# Patient Record
Sex: MALE | Race: OTHER | HISPANIC OR LATINO
[De-identification: names, ages, dates, MRNs, and addresses within clinical notes are randomized per-mention and may not be internally consistent; named-entity substitution may affect disease eponyms.]

---

## 2017-01-09 ENCOUNTER — APPOINTMENT (OUTPATIENT)
Dept: ORTHOPEDIC SURGERY | Facility: CLINIC | Age: 67
End: 2017-01-09

## 2017-01-12 ENCOUNTER — APPOINTMENT (OUTPATIENT)
Dept: ORTHOPEDIC SURGERY | Facility: CLINIC | Age: 67
End: 2017-01-12

## 2017-01-16 ENCOUNTER — APPOINTMENT (OUTPATIENT)
Dept: ORTHOPEDIC SURGERY | Facility: CLINIC | Age: 67
End: 2017-01-16

## 2017-02-02 ENCOUNTER — APPOINTMENT (OUTPATIENT)
Dept: ORTHOPEDIC SURGERY | Facility: CLINIC | Age: 67
End: 2017-02-02

## 2017-02-02 RX ORDER — CEPHALEXIN 500 MG/1
500 CAPSULE ORAL 4 TIMES DAILY
Qty: 20 | Refills: 0 | Status: COMPLETED | COMMUNITY
Start: 2017-01-09 | End: 2017-02-02

## 2017-02-13 ENCOUNTER — APPOINTMENT (OUTPATIENT)
Dept: ORTHOPEDIC SURGERY | Facility: CLINIC | Age: 67
End: 2017-02-13

## 2017-03-10 ENCOUNTER — APPOINTMENT (OUTPATIENT)
Dept: ORTHOPEDIC SURGERY | Facility: CLINIC | Age: 67
End: 2017-03-10

## 2017-06-22 ENCOUNTER — APPOINTMENT (OUTPATIENT)
Dept: ORTHOPEDIC SURGERY | Facility: CLINIC | Age: 67
End: 2017-06-22

## 2017-12-21 ENCOUNTER — APPOINTMENT (OUTPATIENT)
Dept: ORTHOPEDIC SURGERY | Facility: CLINIC | Age: 67
End: 2017-12-21
Payer: COMMERCIAL

## 2017-12-21 DIAGNOSIS — Z47.1 AFTERCARE FOLLOWING JOINT REPLACEMENT SURGERY: ICD-10-CM

## 2017-12-21 DIAGNOSIS — M25.562 PAIN IN RIGHT HIP: ICD-10-CM

## 2017-12-21 DIAGNOSIS — M25.561 PAIN IN RIGHT HIP: ICD-10-CM

## 2017-12-21 DIAGNOSIS — G89.29 PAIN IN RIGHT HIP: ICD-10-CM

## 2017-12-21 DIAGNOSIS — M25.552 PAIN IN RIGHT HIP: ICD-10-CM

## 2017-12-21 DIAGNOSIS — Z96.652 AFTERCARE FOLLOWING JOINT REPLACEMENT SURGERY: ICD-10-CM

## 2017-12-21 DIAGNOSIS — M25.551 PAIN IN RIGHT HIP: ICD-10-CM

## 2017-12-21 PROCEDURE — 99213 OFFICE O/P EST LOW 20 MIN: CPT

## 2022-11-24 ENCOUNTER — EMERGENCY (EMERGENCY)
Facility: HOSPITAL | Age: 72
LOS: 1 days | Discharge: HOME | End: 2022-11-24
Attending: STUDENT IN AN ORGANIZED HEALTH CARE EDUCATION/TRAINING PROGRAM
Payer: MEDICARE

## 2022-11-24 VITALS
RESPIRATION RATE: 16 BRPM | DIASTOLIC BLOOD PRESSURE: 82 MMHG | TEMPERATURE: 98 F | WEIGHT: 154.32 LBS | HEIGHT: 66 IN | SYSTOLIC BLOOD PRESSURE: 135 MMHG | OXYGEN SATURATION: 98 % | HEART RATE: 87 BPM

## 2022-11-24 DIAGNOSIS — Z98.890 OTHER SPECIFIED POSTPROCEDURAL STATES: Chronic | ICD-10-CM

## 2022-11-24 PROCEDURE — 70450 CT HEAD/BRAIN W/O DYE: CPT | Mod: MA

## 2022-11-24 PROCEDURE — 70450 CT HEAD/BRAIN W/O DYE: CPT | Mod: 26,MA

## 2022-11-24 PROCEDURE — 99284 EMERGENCY DEPT VISIT MOD MDM: CPT

## 2022-11-24 PROCEDURE — 99284 EMERGENCY DEPT VISIT MOD MDM: CPT | Mod: 25

## 2022-11-24 PROCEDURE — 73502 X-RAY EXAM HIP UNI 2-3 VIEWS: CPT

## 2022-11-24 PROCEDURE — 73502 X-RAY EXAM HIP UNI 2-3 VIEWS: CPT | Mod: 26,RT

## 2022-11-24 RX ORDER — ACETAMINOPHEN 500 MG
650 TABLET ORAL ONCE
Refills: 0 | Status: COMPLETED | OUTPATIENT
Start: 2022-11-24 | End: 2022-11-24

## 2022-11-24 RX ADMIN — Medication 650 MILLIGRAM(S): at 15:26

## 2022-11-24 RX ADMIN — Medication 650 MILLIGRAM(S): at 15:38
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
## 2022-11-24 NOTE — ED PROVIDER NOTE - NSFOLLOWUPINSTRUCTIONS_ED_ALL_ED_FT
Head Injury, Adult       There are many types of head injuries. Head injuries can be as minor as a small bump, or they can be a serious medical issue. More severe head injuries include:  •A jarring injury to the brain (concussion).      •A bruise (contusion) of the brain. This means there is bleeding in the brain that can cause swelling.      •A cracked skull (skull fracture).      •Bleeding in the brain that collects, clots, and forms a bump (hematoma).      After a head injury, most problems occur within the first 24 hours, but side effects may occur up to 7–10 days after the injury. It is important to watch your condition for any changes. You may need to be observed in the emergency department or urgent care, or you may be admitted to the hospital.      What are the causes?    There are many possible causes of a head injury. Serious head injuries may be caused by car accidents, bicycle or motorcycle accidents, sports injuries, falls, or being struck by an object.      What are the symptoms?    Symptoms of a head injury include a contusion, bump, or bleeding at the site of the injury. Other physical symptoms may include:  •Headache.      •Nausea or vomiting.      •Dizziness.      •Blurred or double vision.      •Being uncomfortable around bright lights or loud noises.      •Seizures.      •Feeling tired.      •Trouble being awakened.      •Loss of consciousness.      Mental or emotional symptoms may include:  •Irritability.      •Confusion and memory problems.      •Poor attention and concentration.      •Changes in eating or sleeping habits.      •Anxiety or depression.        How is this diagnosed?    This condition can usually be diagnosed based on your symptoms, a description of the injury, and a physical exam. You may also have imaging tests done, such as a CT scan or an MRI.      How is this treated?    Treatment for this condition depends on the severity and type of injury you have. The main goal of treatment is to prevent complications and allow the brain time to heal.    Mild head injury     If you have a mild head injury, you may be sent home, and treatment may include:  •Observation. A responsible adult should stay with you for 24 hours after your injury and check on you often.      •Physical rest.      •Brain rest.      •Pain medicines.      Severe head injury    If you have a severe head injury, treatment may include:•Close observation. This includes hospitalization with the following care:  •Frequent physical exams.      •Frequent checks of how your brain and nervous system are working (neurological status).      •Checking your blood pressure and oxygen levels.        •Medicines to relieve pain, prevent seizures, and decrease brain swelling.      •Airway protection and breathing support. This may include using a ventilator.      •Treatments that monitor and manage swelling inside the brain.    •Brain surgery. This may be needed to:  •Remove a collection of blood or blood clots.      •Stop the bleeding.      •Remove a part of the skull to allow room for the brain to swell.          Follow these instructions at home:    Activity     •Rest and avoid activities that are physically hard or tiring.      •Make sure you get enough sleep.    •Let your brain rest by limiting activities that require a lot of thought or attention, such as:  •Watching TV.      •Playing memory games and puzzles.      •Job-related work or homework.      •Working on the computer, using social media, and texting.        •Avoid activities that could cause another head injury, such as playing sports, until your health care provider approves. Having another head injury, especially before the first one has healed, can be dangerous.      •Ask your health care provider when it is safe for you to return to your regular activities, including work or school. Ask your health care provider for a step-by-step plan for gradually returning to activities.      •Ask your health care provider when you can drive, ride a bicycle, or use heavy machinery. Your ability to react may be slower after a brain injury. Do not do these activities if you are dizzy.        Lifestyle      • Do not drink alcohol until your health care provider approves. Do not use drugs. Alcohol and certain drugs may slow your recovery and can put you at risk of further injury.      •If it is harder than usual to remember things, write them down.      •If you are easily distracted, try to do one thing at a time.      •Talk with family members or close friends when making important decisions.      •Tell your friends, family, a trusted colleague, and  about your injury, symptoms, and restrictions. Have them watch for any new or worsening problems.      General instructions     •Take over-the-counter and prescription medicines only as told by your health care provider.      •Have someone stay with you for 24 hours after your head injury. This person should watch you for any changes in your symptoms and be ready to seek medical help.      •Keep all follow-up visits as told by your health care provider. This is important.        How is this prevented?    •Work on improving your balance and strength to avoid falls.      •Wear a seat belt when you are in a moving vehicle.      •Wear a helmet when riding a bicycle, skiing, or doing any other sport or activity that has a risk of injury.    •If you drink alcohol:•Limit how much you use to:  •0–1 drink a day for nonpregnant women.      •0–2 drinks a day for men.        •Be aware of how much alcohol is in your drink. In the [place] one drink equals one 12 oz bottle of beer (355 mL), one 5 oz glass of wine (148 mL), or one 1½ oz glass of hard liquor (44 mL).      •Take safety measures in your home, such as:  •Removing clutter and tripping hazards from floors and stairways.      •Using grab bars in bathrooms and handrails by stairs.      •Placing non-slip mats on floors and in bathtubs.      •Improving lighting in dim areas.          Where to find more information    •Centers for Disease Control and Prevention: www.cdc.gov        Get help right away if:  •You have:  •A severe headache that is not helped by medicine.      •Trouble walking or weakness in your arms and legs.      •Clear or bloody fluid coming from your nose or ears.      •Changes in your vision.      •A seizure.      •Increased confusion or irritability.        •Your symptoms get worse.      •You are sleepier than normal and have trouble staying awake.      •You lose your balance.      •Your pupils change size.      •Your speech is slurred.      •Your dizziness gets worse.      •You vomit.      These symptoms may represent a serious problem that is an emergency. Do not wait to see if the symptoms will go away. Get medical help right away. Call your local emergency services (911 in the [place] Do not drive yourself to the hospital.       Summary    •Head injuries can be minor, or they can be a serious medical issue requiring immediate attention.      •Treatment for this condition depends on the severity and type of injury you have.      •Have someone stay with you for 24 hours after your injury and check on you often.      •Ask your health care provider when it is safe for you to return to your regular activities, including work or school.      •Head injury prevention includes wearing a seat belt in a motor vehicle, using a helmet on a bicycle, limiting alcohol use, and taking safety measures in your home.      This information is not intended to replace advice given to you by your health care provider. Make sure you discuss any questions you have with your health care provider.
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
## 2022-11-24 NOTE — ED PROVIDER NOTE - PATIENT PORTAL LINK FT
You can access the FollowMyHealth Patient Portal offered by [facility] by registering at the following website: [email or web address]. By joining [facility]’s FollowMyHealth portal, you will also be able to view your health information using other applications (apps) compatible with our system.

## 2022-11-24 NOTE — ED PROVIDER NOTE - PROGRESS NOTE DETAILS
Patient ct and xray negative for acute finding. neuro intact. given return precautions and instructed to f.u pmd

## 2024-04-11 ENCOUNTER — INPATIENT (INPATIENT)
Facility: HOSPITAL | Age: 74
LOS: 13 days | Discharge: HOME | DRG: 603 | End: 2024-04-25
Attending: STUDENT IN AN ORGANIZED HEALTH CARE EDUCATION/TRAINING PROGRAM | Admitting: STUDENT IN AN ORGANIZED HEALTH CARE EDUCATION/TRAINING PROGRAM
Payer: MEDICARE

## 2024-04-11 VITALS
TEMPERATURE: 98 F | WEIGHT: 147.93 LBS | HEIGHT: 65 IN | DIASTOLIC BLOOD PRESSURE: 74 MMHG | RESPIRATION RATE: 17 BRPM | HEART RATE: 100 BPM | SYSTOLIC BLOOD PRESSURE: 136 MMHG | OXYGEN SATURATION: 97 %

## 2024-04-11 DIAGNOSIS — Z29.9 ENCOUNTER FOR PROPHYLACTIC MEASURES, UNSPECIFIED: ICD-10-CM

## 2024-04-11 DIAGNOSIS — M32.9 SYSTEMIC LUPUS ERYTHEMATOSUS, UNSPECIFIED: ICD-10-CM

## 2024-04-11 DIAGNOSIS — Z98.890 OTHER SPECIFIED POSTPROCEDURAL STATES: Chronic | ICD-10-CM

## 2024-04-11 DIAGNOSIS — L03.90 CELLULITIS, UNSPECIFIED: ICD-10-CM

## 2024-04-11 DIAGNOSIS — E78.5 HYPERLIPIDEMIA, UNSPECIFIED: ICD-10-CM

## 2024-04-11 DIAGNOSIS — I10 ESSENTIAL (PRIMARY) HYPERTENSION: ICD-10-CM

## 2024-04-11 LAB
ALBUMIN SERPL ELPH-MCNC: 3.2 G/DL — LOW (ref 3.5–5)
ALP SERPL-CCNC: 141 U/L — HIGH (ref 40–120)
ALT FLD-CCNC: 89 U/L DA — HIGH (ref 10–60)
ANION GAP SERPL CALC-SCNC: 6 MMOL/L — SIGNIFICANT CHANGE UP (ref 5–17)
ANISOCYTOSIS BLD QL: SLIGHT — SIGNIFICANT CHANGE UP
APPEARANCE UR: CLEAR — SIGNIFICANT CHANGE UP
APTT BLD: 32 SEC — SIGNIFICANT CHANGE UP (ref 24.5–35.6)
AST SERPL-CCNC: 60 U/L — HIGH (ref 10–40)
BACTERIA # UR AUTO: ABNORMAL /HPF
BASOPHILS # BLD AUTO: 0 K/UL — SIGNIFICANT CHANGE UP (ref 0–0.2)
BASOPHILS NFR BLD AUTO: 0 % — SIGNIFICANT CHANGE UP (ref 0–2)
BILIRUB SERPL-MCNC: 1.1 MG/DL — SIGNIFICANT CHANGE UP (ref 0.2–1.2)
BILIRUB UR-MCNC: ABNORMAL
BUN SERPL-MCNC: 22 MG/DL — HIGH (ref 7–18)
CALCIUM SERPL-MCNC: 9.5 MG/DL — SIGNIFICANT CHANGE UP (ref 8.4–10.5)
CHLORIDE SERPL-SCNC: 107 MMOL/L — SIGNIFICANT CHANGE UP (ref 96–108)
CO2 SERPL-SCNC: 22 MMOL/L — SIGNIFICANT CHANGE UP (ref 22–31)
COLOR SPEC: SIGNIFICANT CHANGE UP
CREAT SERPL-MCNC: 0.95 MG/DL — SIGNIFICANT CHANGE UP (ref 0.5–1.3)
DIFF PNL FLD: NEGATIVE — SIGNIFICANT CHANGE UP
EGFR: 85 ML/MIN/1.73M2 — SIGNIFICANT CHANGE UP
EOSINOPHIL # BLD AUTO: 0 K/UL — SIGNIFICANT CHANGE UP (ref 0–0.5)
EOSINOPHIL NFR BLD AUTO: 0 % — SIGNIFICANT CHANGE UP (ref 0–6)
EPI CELLS # UR: PRESENT
ERYTHROCYTE [SEDIMENTATION RATE] IN BLOOD: 86 MM/HR — HIGH (ref 0–20)
GLUCOSE SERPL-MCNC: 160 MG/DL — HIGH (ref 70–99)
GLUCOSE UR QL: 500 MG/DL
HCT VFR BLD CALC: 38.6 % — LOW (ref 39–50)
HGB BLD-MCNC: 12.8 G/DL — LOW (ref 13–17)
HYPOCHROMIA BLD QL: SLIGHT — SIGNIFICANT CHANGE UP
INR BLD: 1.2 RATIO — HIGH (ref 0.85–1.18)
KETONES UR-MCNC: ABNORMAL MG/DL
LACTATE SERPL-SCNC: 0.8 MMOL/L — SIGNIFICANT CHANGE UP (ref 0.7–2)
LEUKOCYTE ESTERASE UR-ACNC: NEGATIVE — SIGNIFICANT CHANGE UP
LG PLATELETS BLD QL AUTO: SLIGHT — SIGNIFICANT CHANGE UP
LYMPHOCYTES # BLD AUTO: 1.49 K/UL — SIGNIFICANT CHANGE UP (ref 1–3.3)
LYMPHOCYTES # BLD AUTO: 6 % — LOW (ref 13–44)
MANUAL SMEAR VERIFICATION: SIGNIFICANT CHANGE UP
MCHC RBC-ENTMCNC: 30.8 PG — SIGNIFICANT CHANGE UP (ref 27–34)
MCHC RBC-ENTMCNC: 33.2 GM/DL — SIGNIFICANT CHANGE UP (ref 32–36)
MCV RBC AUTO: 92.8 FL — SIGNIFICANT CHANGE UP (ref 80–100)
MICROCYTES BLD QL: SLIGHT — SIGNIFICANT CHANGE UP
MONOCYTES # BLD AUTO: 1.49 K/UL — HIGH (ref 0–0.9)
MONOCYTES NFR BLD AUTO: 6 % — SIGNIFICANT CHANGE UP (ref 2–14)
NEUTROPHILS # BLD AUTO: 21.82 K/UL — HIGH (ref 1.8–7.4)
NEUTROPHILS NFR BLD AUTO: 88 % — HIGH (ref 43–77)
NITRITE UR-MCNC: NEGATIVE — SIGNIFICANT CHANGE UP
NRBC # BLD: 0 /100 WBCS — SIGNIFICANT CHANGE UP (ref 0–0)
PH UR: 5.5 — SIGNIFICANT CHANGE UP (ref 5–8)
PLAT MORPH BLD: NORMAL — SIGNIFICANT CHANGE UP
PLATELET # BLD AUTO: 271 K/UL — SIGNIFICANT CHANGE UP (ref 150–400)
POIKILOCYTOSIS BLD QL AUTO: SLIGHT — SIGNIFICANT CHANGE UP
POTASSIUM SERPL-MCNC: 3.6 MMOL/L — SIGNIFICANT CHANGE UP (ref 3.5–5.3)
POTASSIUM SERPL-SCNC: 3.6 MMOL/L — SIGNIFICANT CHANGE UP (ref 3.5–5.3)
PROT SERPL-MCNC: 7.3 G/DL — SIGNIFICANT CHANGE UP (ref 6–8.3)
PROT UR-MCNC: 100 MG/DL
PROTHROM AB SERPL-ACNC: 13.6 SEC — HIGH (ref 9.5–13)
RBC # BLD: 4.16 M/UL — LOW (ref 4.2–5.8)
RBC # FLD: 13 % — SIGNIFICANT CHANGE UP (ref 10.3–14.5)
RBC BLD AUTO: ABNORMAL
RBC CASTS # UR COMP ASSIST: 2 /HPF — SIGNIFICANT CHANGE UP (ref 0–4)
SODIUM SERPL-SCNC: 135 MMOL/L — SIGNIFICANT CHANGE UP (ref 135–145)
SP GR SPEC: 1.02 — SIGNIFICANT CHANGE UP (ref 1–1.03)
SPHEROCYTES BLD QL SMEAR: SLIGHT — SIGNIFICANT CHANGE UP
UROBILINOGEN FLD QL: 1 MG/DL — SIGNIFICANT CHANGE UP (ref 0.2–1)
WBC # BLD: 24.8 K/UL — HIGH (ref 3.8–10.5)
WBC # FLD AUTO: 24.8 K/UL — HIGH (ref 3.8–10.5)
WBC UR QL: 5 /HPF — SIGNIFICANT CHANGE UP (ref 0–5)

## 2024-04-11 PROCEDURE — 73701 CT LOWER EXTREMITY W/DYE: CPT | Mod: 26,LT

## 2024-04-11 PROCEDURE — 99223 1ST HOSP IP/OBS HIGH 75: CPT

## 2024-04-11 PROCEDURE — 73610 X-RAY EXAM OF ANKLE: CPT | Mod: 26,50

## 2024-04-11 PROCEDURE — 93010 ELECTROCARDIOGRAM REPORT: CPT

## 2024-04-11 PROCEDURE — 99285 EMERGENCY DEPT VISIT HI MDM: CPT

## 2024-04-11 PROCEDURE — 93971 EXTREMITY STUDY: CPT | Mod: 26,LT

## 2024-04-11 PROCEDURE — 73630 X-RAY EXAM OF FOOT: CPT | Mod: 26,LT

## 2024-04-11 RX ORDER — ACETAMINOPHEN 500 MG
1000 TABLET ORAL ONCE
Refills: 0 | Status: COMPLETED | OUTPATIENT
Start: 2024-04-11 | End: 2024-04-11

## 2024-04-11 RX ORDER — COLCHICINE 0.6 MG
0.6 TABLET ORAL ONCE
Refills: 0 | Status: COMPLETED | OUTPATIENT
Start: 2024-04-11 | End: 2024-04-12

## 2024-04-11 RX ORDER — LOSARTAN POTASSIUM 100 MG/1
25 TABLET, FILM COATED ORAL DAILY
Refills: 0 | Status: DISCONTINUED | OUTPATIENT
Start: 2024-04-11 | End: 2024-04-12

## 2024-04-11 RX ORDER — SODIUM CHLORIDE 9 MG/ML
1000 INJECTION, SOLUTION INTRAVENOUS ONCE
Refills: 0 | Status: COMPLETED | OUTPATIENT
Start: 2024-04-11 | End: 2024-04-11

## 2024-04-11 RX ORDER — VANCOMYCIN HCL 1 G
1000 VIAL (EA) INTRAVENOUS EVERY 12 HOURS
Refills: 0 | Status: DISCONTINUED | OUTPATIENT
Start: 2024-04-11 | End: 2024-04-13

## 2024-04-11 RX ORDER — ENOXAPARIN SODIUM 100 MG/ML
40 INJECTION SUBCUTANEOUS EVERY 24 HOURS
Refills: 0 | Status: DISCONTINUED | OUTPATIENT
Start: 2024-04-11 | End: 2024-04-17

## 2024-04-11 RX ORDER — COLCHICINE 0.6 MG
1.2 TABLET ORAL ONCE
Refills: 0 | Status: COMPLETED | OUTPATIENT
Start: 2024-04-11 | End: 2024-04-11

## 2024-04-11 RX ORDER — HYDROXYCHLOROQUINE SULFATE 200 MG
200 TABLET ORAL DAILY
Refills: 0 | Status: DISCONTINUED | OUTPATIENT
Start: 2024-04-11 | End: 2024-04-12

## 2024-04-11 RX ORDER — VANCOMYCIN HCL 1 G
1000 VIAL (EA) INTRAVENOUS ONCE
Refills: 0 | Status: COMPLETED | OUTPATIENT
Start: 2024-04-11 | End: 2024-04-11

## 2024-04-11 RX ORDER — HYDROXYCHLOROQUINE SULFATE 200 MG
0 TABLET ORAL
Qty: 0 | Refills: 0 | DISCHARGE

## 2024-04-11 RX ORDER — LOSARTAN POTASSIUM 100 MG/1
0 TABLET, FILM COATED ORAL
Qty: 0 | Refills: 0 | DISCHARGE

## 2024-04-11 RX ORDER — PIPERACILLIN AND TAZOBACTAM 4; .5 G/20ML; G/20ML
3.38 INJECTION, POWDER, LYOPHILIZED, FOR SOLUTION INTRAVENOUS ONCE
Refills: 0 | Status: COMPLETED | OUTPATIENT
Start: 2024-04-11 | End: 2024-04-11

## 2024-04-11 RX ORDER — SODIUM CHLORIDE 9 MG/ML
1000 INJECTION, SOLUTION INTRAVENOUS
Refills: 0 | Status: DISCONTINUED | OUTPATIENT
Start: 2024-04-11 | End: 2024-04-25

## 2024-04-11 RX ORDER — ACETAMINOPHEN 500 MG
650 TABLET ORAL EVERY 6 HOURS
Refills: 0 | Status: DISCONTINUED | OUTPATIENT
Start: 2024-04-11 | End: 2024-04-25

## 2024-04-11 RX ORDER — ONDANSETRON 8 MG/1
4 TABLET, FILM COATED ORAL EVERY 8 HOURS
Refills: 0 | Status: DISCONTINUED | OUTPATIENT
Start: 2024-04-11 | End: 2024-04-25

## 2024-04-11 RX ADMIN — SODIUM CHLORIDE 1000 MILLILITER(S): 9 INJECTION, SOLUTION INTRAVENOUS at 16:21

## 2024-04-11 RX ADMIN — Medication 1.2 MILLIGRAM(S): at 23:36

## 2024-04-11 RX ADMIN — PIPERACILLIN AND TAZOBACTAM 200 GRAM(S): 4; .5 INJECTION, POWDER, LYOPHILIZED, FOR SOLUTION INTRAVENOUS at 17:02

## 2024-04-11 RX ADMIN — Medication 250 MILLIGRAM(S): at 17:43

## 2024-04-11 RX ADMIN — Medication 100 MILLIGRAM(S): at 16:21

## 2024-04-11 RX ADMIN — Medication 400 MILLIGRAM(S): at 14:57

## 2024-04-11 RX ADMIN — SODIUM CHLORIDE 75 MILLILITER(S): 9 INJECTION, SOLUTION INTRAVENOUS at 17:43

## 2024-04-11 NOTE — ED PROVIDER NOTE - PHYSICAL EXAMINATION
CONSTITUTIONAL: non-toxic, well appearing  SKIN: no rash, no petechiae.  EYES: pink conjunctiva, anicteric  NECK: Supple; no meningismus, no JVD  CARD: RRR, no murmurs, equal radial pulses bilaterally 2+  RESP: CTAB, no respiratory distress  ABD: Soft, non-tender, non-distended, no peritoneal signs, no CVA tenderness  EXT: Normal ROM x4. Left foot with erythema over dorsum and 2+ edema. No calf tenderness, no crepitus. DP 2+. FROM toes.   NEURO: Alert, oriented. Neuro exam nonfocal, equal strength bilaterally  PSYCH: Cooperative, appropriate.

## 2024-04-11 NOTE — H&P ADULT - ASSESSMENT
73 y.o. M with PMHx of HLD, prediabetes, SLE on hydroxychloroquine presents with left foot pain over the past 4 days. Admitted to medicine for L foot cellultis

## 2024-04-11 NOTE — ED ADULT NURSE NOTE - NSFALLUNIVINTERV_ED_ALL_ED
Bed/Stretcher in lowest position, wheels locked, appropriate side rails in place/Call bell, personal items and telephone in reach/Instruct patient to call for assistance before getting out of bed/chair/stretcher/Non-slip footwear applied when patient is off stretcher/[place] to call system/Physically safe environment - no spills, clutter or unnecessary equipment/Purposeful proactive rounding/Room/bathroom lighting operational, light cord in reach

## 2024-04-11 NOTE — ED ADULT TRIAGE NOTE - CCCP TRG CHIEF CMPLNT
Reason For Visit  LORENE AVILA is a(n) 71year old established patient here today for post op S/p neck exploration with intraoperative PTH monitoring, removal of left lower parathyroid adenoma, and biopsy of 3 additional normal parathyroid glands 11/09/2018. Referred By    Patient Care Team:   Carmelina Ghosh. Marley Ford M.D. Quality    Communication CI communication of results to PCP: 11/15/2018, communication of plan to PCP: 11/15/2018, communication of results to Patient: 11/15/2018, communication of plan to Patient: 11/15/2018 and seeing a family doctor or pcp. Allergies  No Known Drug Allergies    Current Meds   1. Amitriptyline HCl - 25 MG Oral Tablet; Therapy: (Recorded:05Nov2018) to Recorded   2. Aspirin 81 MG Oral Tablet Delayed Release; Therapy: (Recorded:05Nov2018) to Recorded   3. Atorvastatin Calcium 20 MG Oral Tablet; Therapy: (Recorded:05Nov2018) to Recorded   4. Magnesium TABS; Therapy: (Recorded:05Nov2018) to Recorded   5. Tamsulosin HCl - 0.4 MG Oral Capsule; Therapy: (Recorded:05Nov2018) to Recorded    Results/Data    Pathology Report: Available    Discussed with patient. Pathology Summary:   Specimen(s) Received  1:Frozen section, left lower parathyroid  2:Frozen section, left upper gland biopsy  3:Frozen section, right lower gland biopsy  4:Frozen section, right upper gland biopsy     Final Pathological Diagnosis  1. 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 Parathyroid gland, left upper, biopsy   -- Normocellular parathyroid tissue    3. Parathryoid gland, right lower, biopsy   -- Normocellular parathyroid tissue    4. Parathyroid gland, right upper, biopsy   -- Normocellular parathyroid tissue    ***Electronically Signed Out By Bandar Mims M.D.***. Discussion/Summary  HISTORY: Donna Jaffe returns for followup after undergoing neck exploration for hyperparathyroidism.  He is doing very well following the surgery. All four glands were identified and an adenoma on the left lower gland was removed. The final pathology shows a 1.6 cm, 500 micrograms parathyroid adenoma. His PTH levels returned to normal immediately after removal of the adenoma. The patient is feeling well. PHYSICAL EXAM:  NECK: Incision is healing properly. ASSESSMENT AND PLAN:  He is having no additional problems. The patient is reassured. He is to continue followup with his primary physician, Dr. Estela Guajardo. See me p.r.n. Transcribed by Eric Santos as per Dr. Joel Gonzalez.       Signatures   Electronically signed by : Joel Gonzalez M.D.; Nov 26 2018 12:37PM CST L foot pain ,redness and swelling

## 2024-04-11 NOTE — ED PROVIDER NOTE - CLINICAL SUMMARY MEDICAL DECISION MAKING FREE TEXT BOX
Travis: 73-year-old male with past medical history hyperlipidemia, prediabetes, SLE on hydroxychloroquine presents with left foot pain over the past 4 days.  Patient states he stepped on a stone 4 days ago, states shortly afterwards he start experiencing left foot redness, swelling, pain, fevers, and chills.  Patient report taking ibuprofen with little relief.  Patient seen by primary care doctor today, advised to the emergency department further evaluation.  Denies any nasal congestion, cough, chest pain, shortness of breath, abdominal pain, vomiting, numbness, or weakness.  Physical exam per above. Likely infectious, concern for cellulitis vs. abscess. Plan includes labs, imaging, supportive treatment, podiatry consult/recs, likely admit.

## 2024-04-11 NOTE — CONSULT NOTE ADULT - SUBJECTIVE AND OBJECTIVE BOX
Patient is a 73y old  Male who presents with a chief complaint of left foot pain    HPI:73-year-old male with past medical history hyperlipidemia, prediabetes, SLE on hydroxychloroquine presents with left foot pain over the past 4 days.  Patient states he stepped on a stone 4 days ago, states shortly afterwards he start experiencing left foot redness, swelling, pain, fevers, and chills.  Patient report taking ibuprofen with little relief.  Patient seen by primary care doctor today, advised to the emergency department further evaluation.  Denies any nasal congestion, cough, chest pain, shortness of breath, abdominal pain, vomiting, numbness, or weakness.  Denies any additional complaints.      Podiatry HPI: 73-year-old male with past medical history hyperlipidemia, prediabetes, SLE on hydroxychloroquine presents with left foot pain over the past 4 days. Patient states that one monday he was walking and stepped on a stone. Patient states later that day he began to develop pain and fevers and chills. Patient states that this entire week his foot has become increasingly swollen and he has been unable to walk. Patient states he has had fevers and chills all week long. Patient went to his PCP who gave him pain meds and recommened he come to ED for further workup.     PMH:Osteoarthritis    Osteoarthritis of left knee    High cholesterol      Allergies: No Known Allergies    Medications: piperacillin/tazobactam IVPB... 3.375 Gram(s) IV Intermittent once  vancomycin  IVPB. 1000 milliGRAM(s) IV Intermittent once    FH:  PSX: No significant past surgical history    H/O foot surgery      SH: piperacillin/tazobactam IVPB... 3.375 Gram(s) IV Intermittent once  vancomycin  IVPB. 1000 milliGRAM(s) IV Intermittent once      Vital Signs Last 24 Hrs  T(C): 36.8 (11 Apr 2024 15:54), Max: 36.9 (11 Apr 2024 13:48)  T(F): 98.2 (11 Apr 2024 15:54), Max: 98.4 (11 Apr 2024 13:48)  HR: 74 (11 Apr 2024 15:54) (74 - 100)  BP: 106/65 (11 Apr 2024 15:54) (106/65 - 136/74)  BP(mean): --  RR: 14 (11 Apr 2024 15:54) (14 - 17)  SpO2: 96% (11 Apr 2024 15:54) (96% - 97%)    Parameters below as of 11 Apr 2024 15:54  Patient On (Oxygen Delivery Method): room air        LABS                        12.8   24.80 )-----------( 271      ( 11 Apr 2024 14:45 )             38.6               04-11    135  |  107  |  22<H>  ----------------------------<  160<H>  3.6   |  22  |  0.95    Ca    9.5      11 Apr 2024 14:45    TPro  7.3  /  Alb  3.2<L>  /  TBili  1.1  /  DBili  x   /  AST  60<H>  /  ALT  89<H>  /  AlkPhos  141<H>  04-11      ROS  REVIEW OF SYSTEMS:    CONSTITUTIONAL: No fever, weight loss, or fatigue  EYES: No eye pain, visual disturbances, or discharge  ENMT:  No difficulty hearing, tinnitus, vertigo; No sinus or throat pain  NECK: No pain or stiffness  BREASTS: No pain, masses, or nipple discharge  RESPIRATORY: No cough, wheezing, chills or hemoptysis; No shortness of breath  CARDIOVASCULAR: No chest pain, palpitations, dizziness, or leg swelling  GASTROINTESTINAL: No abdominal or epigastric pain. No nausea, vomiting, or hematemesis; No diarrhea or constipation. No melena or hematochezia.  GENITOURINARY: No dysuria, frequency, hematuria, or incontinence  NEUROLOGICAL: No headaches, memory loss, loss of strength, numbness, or tremors  SKIN: No itching, burning, rashes, or lesions   LYMPH NODES: No enlarged glands  ENDOCRINE: No heat or cold intolerance; No hair loss  MUSCULOSKELETAL: No joint pain or swelling; No muscle, back, or extremity pain  PSYCHIATRIC: No depression, anxiety, mood swings, or difficulty sleeping  HEME/LYMPH: No easy bruising, or bleeding gums  ALLERY AND IMMUNOLOGIC: No hives or eczema      PHYSICAL EXAM  LE Focused:    Vasc: DP & PT palpable right foot. Left foot PT palpable, DP non palpable 2/2 edema. CFT <3 seconds to all 10 digits b/l. TG warm to warm left foot.   Derm: Left foot increased ecchymosis noted to the dorsal aspect of the foot. Erythema, and edema noted to the left foot. No signs of any open lesions noted. Increased temperature noted to the dorsum of the left foot. No fluctuance noted over the dorsal aspect. No soft tissue crepitus noted.  Neuro: Gross and light touch sensation intact b/l  MSK: PTP to the left foot       IMAGING: ?xray    < from: US Duplex Venous Lower Ext Ltd, Left (04.11.24 @ 15:49) >  PROCEDURE DATE:  04/11/2024          INTERPRETATION:  CLINICAL INFORMATION: Left foot pain    COMPARISON: None available.    TECHNIQUE: Duplex sonography of the LEFT LOWER extremity veins with color   and spectral Doppler, with and without compression.    FINDINGS:    There is normal compressibility of the left common femoral, femoral and   popliteal veins.  The contralateral common femoral vein is patent.  Doppler examination shows normal spontaneous and phasic flow.    No calf vein thrombosis is detected.    IMPRESSION:  No evidence of left lower extremity deep venous thrombosis.            < end of copied text >  < from: Xray Foot AP + Lateral + Oblique, Left (04.11.24 @ 15:29) >      < end of copied text >  < from: Xray Foot AP + Lateral + Oblique, Left (04.11.24 @ 15:29) >      INTERPRETATION:  Bilateral ankles and left foot. Patient has local pain   and swelling.    COMPARISON: None available.    Left ankle. 3 views.    Arterial calcifications are noted.    There are some calcifications starting posterior to the posterior   calcaneus proceeding superiorly. There is mild degeneration of the   malleolar tips. No fracture.    Right ankle. 3 views.    There has been talar calcaneal fusion and also fusion of the lower fibula   with these 2 bones. Orthopedic hardware is seen over the lower medial   tibial area.    There is an inferior calcaneal spur.    No bone destruction or acute fracture.    Left foot. 3 views.    There is mild first MTP degeneration and mild swelling of the dorsum of   the foot. No fracture.    IMPRESSION: No acute fracture. Mild swelling dorsum left foot. Old fusion   of the right ankle joint with some orthopedic hardware.    --- End of Report ---    < end of copied text >      CULTURES:

## 2024-04-11 NOTE — H&P ADULT - NSHPREVIEWOFSYSTEMS_GEN_ALL_CORE
REVIEW OF SYSTEMS:    CONSTITUTIONAL: No weakness, fevers or chills  EYES/ENT: No visual changes;  No vertigo or throat pain   NECK: No pain or stiffness  RESPIRATORY: No cough, wheezing, hemoptysis; No shortness of breath  CARDIOVASCULAR: No chest pain or palpitations  GASTROINTESTINAL: No abdominal or epigastric pain. No nausea, vomiting, or hematemesis; No diarrhea or constipation. No melena or hematochezia.  GENITOURINARY: No dysuria, frequency or hematuria  NEUROLOGICAL: No numbness or weakness  SKIN: +L foot pain, No itching, burning, rashes, or lesions   All other review of systems is negative unless indicated above.

## 2024-04-11 NOTE — PATIENT PROFILE ADULT - FALL HARM RISK - HARM RISK INTERVENTIONS
Assistance with ambulation/Assistance OOB with selected safe patient handling equipment/Communicate Risk of Fall with Harm to all staff/Monitor for mental status changes/Monitor gait and stability/Provide patient with walking aids - walker, cane, crutches/Reinforce activity limits and safety measures with patient and family/Reorient to person, place and time as needed/Review medications for side effects contributing to fall risk/Sit up slowly, dangle for a short time, stand at bedside before walking/Tailored Fall Risk Interventions/Toileting schedule using arm’s reach rule for commode and bathroom/Use of alarms - bed, chair and/or voice tab/Visual Cue: Yellow wristband and red socks/Bed in lowest position, wheels locked, appropriate side rails in place/Call bell, personal items and telephone in reach/Instruct patient to call for assistance before getting out of bed or chair/Non-slip footwear when patient is out of bed/[place] to call system/Physically safe environment - no spills, clutter or unnecessary equipment/Purposeful Proactive Rounding/Room/bathroom lighting operational, light cord in reach

## 2024-04-11 NOTE — ED ADULT NURSE NOTE - OBJECTIVE STATEMENT
Pt c/o pain and swelling to right foot after stepping on a stone on 4/8/24. Pt c/o pain and swelling to left foot after stepping on a stone on 4/8/24.

## 2024-04-11 NOTE — H&P ADULT - PROBLEM SELECTOR PLAN 1
Pw L foot swelling, with fevers at home  PE: Left foot increased ecchymosis on dorsal foot. Erythema, and edema noted to the left foot. No signs of any open lesions.  Xray foot- Mild swelling dorsum left foot. Old fusion   of the right ankle joint with some orthopedic hardware.  [place] duplex- Neg for DVT     started on IVF   started on Vanco   pain management   podiatry following  f/u CT foot to r/o any abscess, consult surgery is abscess present

## 2024-04-11 NOTE — H&P ADULT - NSHPPHYSICALEXAM_GEN_ALL_CORE
VITALS:     GENERAL: NAD, lying in bed comfortably  HEAD:  Atraumatic, Normocephalic  EYES: EOMI, PERRLA, conjunctiva and sclera clear  ENT: Moist mucous membranes  NECK: Supple, No JVD  CHEST/LUNG: Clear to auscultation bilaterally; No rales, rhonchi, wheezing, or rubs. Unlabored respirations  HEART: Regular rate and rhythm; No murmurs, rubs, or gallops  ABDOMEN: Bowel sounds present; Soft, Nontender, Nondistended. No hepatomegaly  EXTREMITIES:  2+ Peripheral Pulses, brisk capillary refill. No clubbing, cyanosis, or edema  NERVOUS SYSTEM:  Alert & Oriented X3, speech clear. No deficits   MSK: FROM all 4 extremities, full and equal strength  SKIN: Left foot increased ecchymosis noted to the dorsal aspect of the foot. Erythema, and edema noted to the left foot. No signs of any open lesions noted. Increased temperature noted to the dorsum of the left foot. No fluctuance noted over the dorsal aspect. No soft tissue crepitus noted.

## 2024-04-11 NOTE — ED ADULT NURSE NOTE - CHIEF COMPLAINT QUOTE
"CC: Right knee scope post op 6 months.  He is no longer in formal physical therapy.  He reports some difficulty with running.  Otherwise he reports he is doing well.    DATE OF PROCEDURE: 12/19/2016      PREOPERATIVE DIAGNOSES:   1. Right knee anterior cruciate ligament tear.   2. Right knee medial meniscus tear  3. Right knee lateral meniscus tear  4. Right knee medial femoral condyle cartilage injury     POSTOPERATIVE DIAGNOSES:   1. Right knee anterior cruciate ligament tear.   2. Right knee medial meniscus tear  3. Right knee lateral meniscus tear  4. Right knee medial femoral condyle cartilage injury     PROCEDURES:   1. Right knee anterior cruciate ligament reconstruction with ipsilateral bone-patellar tendon-bone autograft.   2. Right knee arthroscopic medial and lateral meniscus repairs  3. Right knee arthroscopic chondroplasty / debridement medial femoral condyle     SURGEON: Margarito Machuca M.D.    PE:    BP (!) 152/88   Pulse 107   Ht 6' 2" (1.88 m)   Wt 86.2 kg (190 lb)   BMI 24.39 kg/m²      Right knee:    Incision clean/dry/intact  No sign of infection  Mild swelling  Compartments soft  Neurovascular status intact in extremity    FROM  Good quad strength  No effusion  No tenderness over medial or lateral joint lines  Negative lachman    ROM: 0-135    Assessment:  6 months s/p right knee ACL     Plan:  Follow up 2-3 months.  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  He will call to follow up.      " L foot pain ,redness and swelling after accidently stepping on a stone on 4/8/24

## 2024-04-11 NOTE — ED PROVIDER NOTE - PROGRESS NOTE DETAILS
Travis: pt seen and re-evaluated at bedside.  Pt comfortable in NAD.  Patient seen by podiatry, no drainable collection, will admit. Discussed with hospitalist and MAR re: admission.

## 2024-04-11 NOTE — PATIENT PROFILE ADULT - NSPRONUTRITIONRISK_GEN_A_NUR
Report given to Laurie JIMENEZ. Medications given and vital signs reviewed. Lidocaine given at 958, NPO until 1158.   No indicators present

## 2024-04-11 NOTE — H&P ADULT - ATTENDING COMMENTS
73M PMH SLE, HLD, preDM p/w cc L foot pain. Reports stepping on a stone 4d ago, after which he had swelling, pain, fevers, chills. Found with leukocytosis, admission for cellulitis.     AP:  Cellulitis vs gout vs pseudogout  Leukocytosis  Anemia  Transaminitis  SLE  HLD  preDM    -On exam, foot is swollen but no skin breaks. DDx SSTI vs gout vs pseudogout  -received vanc/zosyn in ED; ok to c/w vanc overnight  -XR ankles reviewed; no obvious free air  -obtain CT of L foot with contrast for fluid collection to aspirate  -consult podiatry  -no DVT on doppler of L leg  -f/u BCx  -resume home meds including hydroxychloroquine  -dvt ppx    =================================  I independently reviewed the following tests: XR ankles reviewed; no obvious free air  I discussed management with specialists and the plan of care is described above.  I ordered labs and studies: CBC, BMP  I reviewed the following labs to guide my management:                        11.4   20.24 )-----------( 257      ( 12 Apr 2024 05:40 )             34.0     04-12    138  |  107  |  18  ----------------------------<  155<H>  3.4<L>   |  20<L>  |  0.67    Ca    8.7      12 Apr 2024 05:40  Phos  3.2     04-12  Mg     2.0     04-12    TPro  7.3  /  Alb  3.2<L>  /  TBili  1.1  /  DBili  x   /  AST  60<H>  /  ALT  89<H>  /  AlkPhos  141<H>  04-11

## 2024-04-11 NOTE — ED PROVIDER NOTE - OBJECTIVE STATEMENT
73-year-old male with past medical history hyperlipidemia, prediabetes, SLE on hydroxychloroquine presents with left foot pain over the past 4 days.  Patient states he stepped on a stone 4 days ago, states shortly afterwards he start experiencing left foot redness, swelling, pain, fevers, and chills.  Patient report taking ibuprofen with little relief.  Patient seen by primary care doctor today, advised to the emergency department further evaluation.  Denies any nasal congestion, cough, chest pain, shortness of breath, abdominal pain, vomiting, numbness, or weakness.  Denies any additional complaints.

## 2024-04-12 DIAGNOSIS — Z02.9 ENCOUNTER FOR ADMINISTRATIVE EXAMINATIONS, UNSPECIFIED: ICD-10-CM

## 2024-04-12 DIAGNOSIS — R78.81 BACTEREMIA: ICD-10-CM

## 2024-04-12 LAB
-  STAPHYLOCOCCUS EPIDERMIDIS, METHICILLIN RESISTANT: SIGNIFICANT CHANGE UP
ALBUMIN SERPL ELPH-MCNC: 2.5 G/DL — LOW (ref 3.5–5)
ALP SERPL-CCNC: 182 U/L — HIGH (ref 40–120)
ALT FLD-CCNC: 109 U/L DA — HIGH (ref 10–60)
ANION GAP SERPL CALC-SCNC: 11 MMOL/L — SIGNIFICANT CHANGE UP (ref 5–17)
ANION GAP SERPL CALC-SCNC: 4 MMOL/L — LOW (ref 5–17)
APTT BLD: 33.9 SEC — SIGNIFICANT CHANGE UP (ref 24.5–35.6)
AST SERPL-CCNC: 72 U/L — HIGH (ref 10–40)
BILIRUB SERPL-MCNC: 0.6 MG/DL — SIGNIFICANT CHANGE UP (ref 0.2–1.2)
BUN SERPL-MCNC: 17 MG/DL — SIGNIFICANT CHANGE UP (ref 7–18)
BUN SERPL-MCNC: 18 MG/DL — SIGNIFICANT CHANGE UP (ref 7–18)
CALCIUM SERPL-MCNC: 8.7 MG/DL — SIGNIFICANT CHANGE UP (ref 8.4–10.5)
CALCIUM SERPL-MCNC: 9.1 MG/DL — SIGNIFICANT CHANGE UP (ref 8.4–10.5)
CHLORIDE SERPL-SCNC: 107 MMOL/L — SIGNIFICANT CHANGE UP (ref 96–108)
CHLORIDE SERPL-SCNC: 110 MMOL/L — HIGH (ref 96–108)
CO2 SERPL-SCNC: 20 MMOL/L — LOW (ref 22–31)
CO2 SERPL-SCNC: 25 MMOL/L — SIGNIFICANT CHANGE UP (ref 22–31)
CREAT SERPL-MCNC: 0.63 MG/DL — SIGNIFICANT CHANGE UP (ref 0.5–1.3)
CREAT SERPL-MCNC: 0.67 MG/DL — SIGNIFICANT CHANGE UP (ref 0.5–1.3)
CRP SERPL-MCNC: 279 MG/L — HIGH
EGFR: 101 ML/MIN/1.73M2 — SIGNIFICANT CHANGE UP
EGFR: 99 ML/MIN/1.73M2 — SIGNIFICANT CHANGE UP
GLUCOSE SERPL-MCNC: 135 MG/DL — HIGH (ref 70–99)
GLUCOSE SERPL-MCNC: 155 MG/DL — HIGH (ref 70–99)
GRAM STN FLD: ABNORMAL
HCT VFR BLD CALC: 34 % — LOW (ref 39–50)
HCT VFR BLD CALC: 34.2 % — LOW (ref 39–50)
HCV AB S/CO SERPL IA: 0.07 S/CO — SIGNIFICANT CHANGE UP (ref 0–0.99)
HCV AB SERPL-IMP: SIGNIFICANT CHANGE UP
HGB BLD-MCNC: 11.4 G/DL — LOW (ref 13–17)
HGB BLD-MCNC: 11.5 G/DL — LOW (ref 13–17)
INR BLD: 1.18 RATIO — SIGNIFICANT CHANGE UP (ref 0.85–1.18)
LACTATE SERPL-SCNC: 0.7 MMOL/L — SIGNIFICANT CHANGE UP (ref 0.7–2)
MAGNESIUM SERPL-MCNC: 2 MG/DL — SIGNIFICANT CHANGE UP (ref 1.6–2.6)
MCHC RBC-ENTMCNC: 30.7 PG — SIGNIFICANT CHANGE UP (ref 27–34)
MCHC RBC-ENTMCNC: 30.9 PG — SIGNIFICANT CHANGE UP (ref 27–34)
MCHC RBC-ENTMCNC: 33.5 GM/DL — SIGNIFICANT CHANGE UP (ref 32–36)
MCHC RBC-ENTMCNC: 33.6 GM/DL — SIGNIFICANT CHANGE UP (ref 32–36)
MCV RBC AUTO: 91.6 FL — SIGNIFICANT CHANGE UP (ref 80–100)
MCV RBC AUTO: 91.9 FL — SIGNIFICANT CHANGE UP (ref 80–100)
METHOD TYPE: SIGNIFICANT CHANGE UP
NRBC # BLD: 0 /100 WBCS — SIGNIFICANT CHANGE UP (ref 0–0)
NRBC # BLD: 0 /100 WBCS — SIGNIFICANT CHANGE UP (ref 0–0)
PHOSPHATE SERPL-MCNC: 3.2 MG/DL — SIGNIFICANT CHANGE UP (ref 2.5–4.5)
PLATELET # BLD AUTO: 257 K/UL — SIGNIFICANT CHANGE UP (ref 150–400)
PLATELET # BLD AUTO: 276 K/UL — SIGNIFICANT CHANGE UP (ref 150–400)
POTASSIUM SERPL-MCNC: 3.4 MMOL/L — LOW (ref 3.5–5.3)
POTASSIUM SERPL-MCNC: 3.6 MMOL/L — SIGNIFICANT CHANGE UP (ref 3.5–5.3)
POTASSIUM SERPL-SCNC: 3.4 MMOL/L — LOW (ref 3.5–5.3)
POTASSIUM SERPL-SCNC: 3.6 MMOL/L — SIGNIFICANT CHANGE UP (ref 3.5–5.3)
PROT SERPL-MCNC: 6.3 G/DL — SIGNIFICANT CHANGE UP (ref 6–8.3)
PROTHROM AB SERPL-ACNC: 13.4 SEC — HIGH (ref 9.5–13)
RBC # BLD: 3.71 M/UL — LOW (ref 4.2–5.8)
RBC # BLD: 3.72 M/UL — LOW (ref 4.2–5.8)
RBC # FLD: 13.1 % — SIGNIFICANT CHANGE UP (ref 10.3–14.5)
RBC # FLD: 13.2 % — SIGNIFICANT CHANGE UP (ref 10.3–14.5)
SODIUM SERPL-SCNC: 138 MMOL/L — SIGNIFICANT CHANGE UP (ref 135–145)
SODIUM SERPL-SCNC: 139 MMOL/L — SIGNIFICANT CHANGE UP (ref 135–145)
SPECIMEN SOURCE: SIGNIFICANT CHANGE UP
SPECIMEN SOURCE: SIGNIFICANT CHANGE UP
URATE SERPL-MCNC: 4.5 MG/DL — SIGNIFICANT CHANGE UP (ref 3.4–8.8)
WBC # BLD: 16.26 K/UL — HIGH (ref 3.8–10.5)
WBC # BLD: 20.24 K/UL — HIGH (ref 3.8–10.5)
WBC # FLD AUTO: 16.26 K/UL — HIGH (ref 3.8–10.5)
WBC # FLD AUTO: 20.24 K/UL — HIGH (ref 3.8–10.5)

## 2024-04-12 PROCEDURE — 99233 SBSQ HOSP IP/OBS HIGH 50: CPT

## 2024-04-12 RX ORDER — SODIUM CHLORIDE 9 MG/ML
2100 INJECTION INTRAMUSCULAR; INTRAVENOUS; SUBCUTANEOUS ONCE
Refills: 0 | Status: COMPLETED | OUTPATIENT
Start: 2024-04-12 | End: 2024-04-12

## 2024-04-12 RX ORDER — ACETAMINOPHEN 500 MG
1000 TABLET ORAL ONCE
Refills: 0 | Status: COMPLETED | OUTPATIENT
Start: 2024-04-12 | End: 2024-04-12

## 2024-04-12 RX ORDER — COLCHICINE 0.6 MG
0.6 TABLET ORAL
Refills: 0 | Status: DISCONTINUED | OUTPATIENT
Start: 2024-04-12 | End: 2024-04-12

## 2024-04-12 RX ORDER — MORPHINE SULFATE 50 MG/1
2 CAPSULE, EXTENDED RELEASE ORAL ONCE
Refills: 0 | Status: DISCONTINUED | OUTPATIENT
Start: 2024-04-12 | End: 2024-04-12

## 2024-04-12 RX ADMIN — SODIUM CHLORIDE 2100 MILLILITER(S): 9 INJECTION INTRAMUSCULAR; INTRAVENOUS; SUBCUTANEOUS at 17:29

## 2024-04-12 RX ADMIN — MORPHINE SULFATE 2 MILLIGRAM(S): 50 CAPSULE, EXTENDED RELEASE ORAL at 17:29

## 2024-04-12 RX ADMIN — SODIUM CHLORIDE 75 MILLILITER(S): 9 INJECTION, SOLUTION INTRAVENOUS at 05:29

## 2024-04-12 RX ADMIN — Medication 650 MILLIGRAM(S): at 20:00

## 2024-04-12 RX ADMIN — Medication 200 MILLIGRAM(S): at 12:56

## 2024-04-12 RX ADMIN — MORPHINE SULFATE 2 MILLIGRAM(S): 50 CAPSULE, EXTENDED RELEASE ORAL at 16:11

## 2024-04-12 RX ADMIN — Medication 400 MILLIGRAM(S): at 06:48

## 2024-04-12 RX ADMIN — LOSARTAN POTASSIUM 25 MILLIGRAM(S): 100 TABLET, FILM COATED ORAL at 05:25

## 2024-04-12 RX ADMIN — Medication 0.6 MILLIGRAM(S): at 13:45

## 2024-04-12 RX ADMIN — Medication 650 MILLIGRAM(S): at 19:06

## 2024-04-12 RX ADMIN — Medication 250 MILLIGRAM(S): at 18:45

## 2024-04-12 RX ADMIN — Medication 1000 MILLIGRAM(S): at 07:39

## 2024-04-12 RX ADMIN — Medication 0.6 MILLIGRAM(S): at 00:31

## 2024-04-12 RX ADMIN — ENOXAPARIN SODIUM 40 MILLIGRAM(S): 100 INJECTION SUBCUTANEOUS at 06:50

## 2024-04-12 RX ADMIN — Medication 250 MILLIGRAM(S): at 05:25

## 2024-04-12 NOTE — PROGRESS NOTE ADULT - NS ATTEND AMEND GEN_ALL_CORE FT
A/P# Sepsis 2/2 Gram positive Bacteremia # Left foot cellulitis w/ Abscess    patient was seen twice today. in the morning c/o foot pain. CT scan was reviewed and there was possibility of gouty arthritis. ATBx was continues along with colchicine- in the afternoon blood cx- showed GPC bacteremia. colchicine and losartan was held. 2 litre ivf was given given sbp<100, Case was d/w Dr. Valencia - bedside I and D done ~10 cc of pus drained. repeat labs checked- lactate normal, wbc trended down. Vitals improved  -plan for TTE to r/o endocarditis- rpt blood cx in the morning  -will fu podiatry and ID recs  case d/w ID- Dr. Downey, no weight-bearing restrictions

## 2024-04-12 NOTE — PROGRESS NOTE ADULT - PROBLEM SELECTOR PLAN 4
IMPROVE VTE Individual Risk Assessment          RISK                                                          Points  [  ] Previous VTE                                                3  [  ] Thrombophilia                                             2  [  ] Lower limb paralysis                                   2        (unable to hold up >15 seconds)    [  ] Current Cancer                                             2         (within 6 months)  [ x ] Immobilization > 24 hrs                              1  [  ] ICU/CCU stay > 24 hours                             1  [ x ] Age > 60                                                         1    IMPROVE VTE Score:         [    2     ] holding BP meds in the setting of ? sepsis   Monitor BP  DASH/TLC diet  resume home meds when indicated

## 2024-04-12 NOTE — PROGRESS NOTE ADULT - PROBLEM SELECTOR PLAN 2
c/w hydroxychloroquine P/W right foot increased ecchymosis on dorsal foot. Erythema, and edema noted to the left foot.   Xray foot- Mild swelling dorsum left foot. Old fusion of the right ankle joint with some orthopedic hardware.  Marlon duplex- Neg for DVT   CT as above   plan for bedside i&d   continue iv vanco   pain management   podiatry following  ID consulted

## 2024-04-12 NOTE — PROGRESS NOTE ADULT - PROBLEM SELECTOR PLAN 1
Pw L foot swelling, with fevers at home  PE: Left foot increased ecchymosis on dorsal foot. Erythema, and edema noted to the left foot. No signs of any open lesions.  Xray foot- Mild swelling dorsum left foot. Old fusion   of the right ankle joint with some orthopedic hardware.  Marlon duplex- Neg for DVT     started on IVF   started on Vanco   pain management   podiatry following  f/u CT foot to r/o any abscess, consult surgery is abscess present BCx growing gram + cocci in clusters   f/u final   continue vanco   TTE + YONI   Will need repeat Cx   ID Nasreen shannon

## 2024-04-12 NOTE — CONSULT NOTE ADULT - CARDIOVASCULAR SYMPTOMS
Patient called. Instructions reviewed. He verbalized understanding. He is going to cancel cataract surgery for now until BP under better control. left foot/peripheral edema

## 2024-04-12 NOTE — CONSULT NOTE ADULT - SUBJECTIVE AND OBJECTIVE BOX
HPI:  73 y.o. M with PMHx of HLD, prediabetes, SLE on hydroxychloroquine presents with left foot pain over the past 4 days. Patient states that on Monday he was walking in [place] sandals on the beach and stepped on a stone. Patient states later that day he began to develop pain and fevers and chills. Patient states that this entire week his foot has become increasingly swollen and he has been unable to walk. Patient states he has had fevers and chills all week long, and took some tylenol with minimal relief. Patient went to his PCP who gave him pain meds and recommended he come to ED for further workup. Otherwise pt denies any chest pain, palpitations, shortness of breath, fever, chills, headache, visual changes, nausea, vomiting, diarrhea, dysuria, frequency.    (11 Apr 2024 18:09)                  PAST MEDICAL & SURGICAL HISTORY:  Osteoarthritis of left knee      High cholesterol      H/O foot surgery  Right          No Known Allergies      Meds:  acetaminophen     Tablet .. 650 milliGRAM(s) Oral every 6 hours PRN  enoxaparin Injectable 40 milliGRAM(s) SubCutaneous every 24 hours  lactated ringers. 1000 milliLiter(s) IV Continuous <Continuous>  ondansetron Injectable 4 milliGRAM(s) IV Push every 8 hours PRN  vancomycin  IVPB 1000 milliGRAM(s) IV Intermittent every 12 hours      SOCIAL HISTORY:  Smoker:  YES / NO        PACK YEARS:                         WHEN QUIT?  ETOH use:  YES / NO               FREQUENCY / QUANTITY:  Ilicit Drug use:  YES / NO  Occupation:  Assisted device use (Cane / Walker):  Live with:    FAMILY HISTORY:      VITALS:  Vital Signs Last 24 Hrs  T(C): 37.3 (12 Apr 2024 15:32), Max: 38.6 (12 Apr 2024 06:22)  T(F): 99.2 (12 Apr 2024 15:32), Max: 101.4 (12 Apr 2024 06:22)  HR: 75 (12 Apr 2024 16:37) (64 - 87)  BP: 131/77 (12 Apr 2024 16:37) (96/55 - 131/77)  BP(mean): 73 (12 Apr 2024 12:52) (73 - 73)  RR: 18 (12 Apr 2024 15:32) (16 - 18)  SpO2: 95% (12 Apr 2024 15:32) (95% - 96%)    Parameters below as of 12 Apr 2024 15:32  Patient On (Oxygen Delivery Method): room air        LABS/DIAGNOSTIC TESTS:                          11.5   16.26 )-----------( 276      ( 12 Apr 2024 15:53 )             34.2     WBC Count: 16.26 K/uL (04-12 @ 15:53)  WBC Count: 20.24 K/uL (04-12 @ 05:40)  WBC Count: 24.80 K/uL (04-11 @ 14:45)      04-12    139  |  110<H>  |  17  ----------------------------<  135<H>  3.6   |  25  |  0.63    Ca    9.1      12 Apr 2024 15:53  Phos  3.2     04-12  Mg     2.0     04-12    TPro  6.3  /  Alb  2.5<L>  /  TBili  0.6  /  DBili  x   /  AST  72<H>  /  ALT  109<H>  /  AlkPhos  182<H>  04-12      uUrine Microscopic-Add On (NC) (04.11.24 @ 14:54)   Red Blood Cell - Urine: 2 /HPF  White Blood Cell - Urine: 5 /HPF  Bacteria: Occasional /HPF  Squamous Epithelial Cells: PresentUrinalysis with Rflx Culture (04.11.24 @ 14:54)   Urine Appearance: Clear  Color: Dark Yellow  Specific Gravity: 1.024  pH Urine: 5.5  Protein, Urine: 100 mg/dL  Glucose Qualitative, Urine: 500 mg/dL  Ketone - Urine: Trace mg/dL  Blood, Urine: Negative  Bilirubin: Small  Urobilinogen: 1.0 mg/dL  Leukocyte Esterase Concentration: Negative  Nitrite: Negative      LIVER FUNCTIONS - ( 12 Apr 2024 15:53 )  Alb: 2.5 g/dL / Pro: 6.3 g/dL / ALK PHOS: 182 U/L / ALT: 109 U/L DA / AST: 72 U/L / GGT: x             PT/INR - ( 12 Apr 2024 15:53 )   PT: 13.4 sec;   INR: 1.18 ratio         PTT - ( 12 Apr 2024 15:53 )  PTT:33.9 sec    LACTATE:Lactate, Blood: 0.7 mmol/L (04-12 @ 15:53)      ABG -     CULTURES:   .Blood Blood-Peripheral  04-11 @ 14:45   Growth in aerobic bottle: Gram Positive Cocci in Clusters  --    Growth in aerobic bottle: Gram Positive Cocci in Clusters      .Blood Blood  04-11 @ 14:40   Growth in anaerobic bottle: Gram Positive Cocci in Clusters  Growth in aerobic bottle: Gram Positive Cocci in Clusters  Direct identification is available within approximately 3-5  hours either by Blood Panel Multiplexed PCR or Direct  MALDI-TOF. Details: [email or web address]  --  Blood Culture PCR          RADIOLOGY:< from: CT Foot w/ IV Cont, Left (04.11.24 @ 19:27) >  ACC: [id number] EXAM:  CT FOOT ONLY IC LT   ORDERED BY: UZIEL FERGUSON     PROCEDURE DATE:  04/11/2024          INTERPRETATION:  Exam Type: CT FOOT WITH IV CONTRAST LEFT  Exam Date and Time: 4/11/2024 7:27 PM  Indication: Left foot pain. Evaluate forabscess.  Comparison: Same-day foot and ankle x-rays.    TECHNIQUE:  Multiplanar CT images of the left foot were obtained after administration   of 90 cc of Omnipaque 350 (10 cc discarded).    FINDINGS:  Evaluation of the left foot demonstrate juxtaarticular erosive changes at   the tibiotalar joint asymmetric to the distal lateral margin of the   tibia. Extensive mineralization is identified within the tibiotalar   joint. Similar findings of mineralization is seen about the first MTP   joint as well as at some of the TMT articulations. However, discrete   osseous erosions are not identified at these locations. There is no   osseous destruction or aggressive periosteal reaction identified. No   fracture or dislocation.    There is degenerative arthrosis involving the naviculocuneiform   articulation. No advanced productive changes are seen across the Lisfranc   interval.    Within the soft tissues, there is diffuse swelling identified along the   dorsal foot. There is fluid about the first TMT joint which decompresses   along the dorsal surface of the midfoot/forefoot. This fluid collection   along the dorsal midfoot/forefoot measures approximately 2.5 cm in the AP   dimension and approximately 2.9 cm in the transverse dimension. There is   peripheral enhancement with lack of central enhancement.    Mineralization is seen involving the distal insertional fibers of the   Achilles tendon as well as within the posterior tibialis tendon.   Otherwise, the imaged tendons are grossly intact.      IMPRESSION:  1.  Findings of gouty arthropathy about the foot as described. No CT   findings to suggest osteomyelitis.  2.  Peripheral enhancing fluid collection seen about the dorsal foot   about the first TMT joint as described is also felt to related to the   gouty arthropathy. An abscess is a less likely consideration but cannot   be entirely excluded on this examination.    --- End of Report ---            DAXA OVIEDO MD; Attending Radiologist  This document has been electronically signed. Apr 11 2024  8:16PM    < end of copied text >  ----------------------------------------------------------------------------------------------------------------------------------------------------  ACC: [id number] EXAM:  US DPLX LWR EXT VEINS LTD LT   ORDERED BY: ZAID CURRY     PROCEDURE DATE:  04/11/2024          INTERPRETATION:  CLINICAL INFORMATION: Left foot pain    COMPARISON: None available.    TECHNIQUE: Duplex sonography of the LEFT LOWER extremity veins with color   and spectral Doppler, with and without compression.    FINDINGS:    There is normal compressibility of the left common femoral, femoral and   popliteal veins.  The contralateral common femoral vein is patent.  Doppler examination shows normal spontaneous and phasic flow.    No calf vein thrombosis is detected.    IMPRESSION:  No evidence of left lower extremity deep venous thrombosis.            --- End of Report ---            ANGELITO MCDANIEL MD; Attending Radiologist  This document has been electronically signed. Apr 11 2024  3:54PM    < end of copied text >  ---------------------------------------------------------------------------------------------------------------------------------------------------  ACC: [id number] EXAM:  XR FOOT COMP MIN 3 VIEWS LT   ORDERED BY: ZAID CURRY     ACC: [id number] EXAM:  XR ANKLE COMP MIN 3 VIEWS BI   ORDERED BY: ZAID CURRY     PROCEDURE DATE:  04/11/2024          INTERPRETATION:  Bilateral ankles and left foot. Patient has local pain   and swelling.    COMPARISON: None available.    Left ankle. 3 views.    Arterial calcifications are noted.    There are some calcifications starting posterior to the posterior   calcaneus proceeding superiorly. There is mild degeneration of the   malleolar tips. No fracture.    Right ankle. 3 views.    There has been talar calcaneal fusion and also fusion of the lower fibula   with these 2 bones. Orthopedic hardware is seen over the lower medial   tibial area.    There is an inferior calcaneal spur.    No bone destruction or acute fracture.    Left foot. 3 views.    There is mild first MTP degeneration and mild swelling of the dorsum of   the foot. No fracture.    IMPRESSION: No acute fracture. Mild swelling dorsum left foot. Old fusion   of the right ankle joint with some orthopedic hardware.    --- End of Report ---            CARMINE PAREKH MD; Attending Radiologist  This document has been electronically signed. Apr 11 2024  3:42PM    < end of copied text >        ROS  [  ] UNABLE TO ELICIT               HPI:  73 y.o. M with PMHx of HLD, prediabetes, SLE on hydroxychloroquine presents with left foot pain over the past 4 days. Patient states that on Monday he was walking in his sandals on the beach and stepped on a stone. Patient states later that day he began to develop pain and fevers and chills. Patient states that this entire week his foot has become increasingly swollen and he has been unable to walk. Patient states he has had fevers and chills all week long, and took some tylenol with minimal relief. Patient went to his PCP who gave him pain meds and recommended he come to ED for further workup. Otherwise pt denies any chest pain, palpitations, shortness of breath, fever, chills, headache, visual changes, nausea, vomiting, diarrhea, dysuria, frequency.    (11 Apr 2024 18:09)        History as above, asked to see this patient who presents with left foot swelling , redness and pain x 4-5 days after he stepped on a stone and injured his foot, he had fevers and chills and then developed difficulty walking and so came to the hospital. He was found to be febrile here and have an elevated WBC count here and was also found to have cellulitis of his left foot and a abscess of the dorsum of his left foot and had a bedside I&D of the left foot today and had bloody purulent fluid drained. He is feeling a little better with less foot pain. His CT foot did not show any evidence of Osteomyelitis. He was found to be Bacteremic with MRSE here also ( likely contamination only).          PAST MEDICAL & SURGICAL HISTORY:  Osteoarthritis of left knee      High cholesterol      H/O foot surgery  Right          No Known Allergies      Meds:  acetaminophen     Tablet .. 650 milliGRAM(s) Oral every 6 hours PRN  enoxaparin Injectable 40 milliGRAM(s) SubCutaneous every 24 hours  lactated ringers. 1000 milliLiter(s) IV Continuous <Continuous>  ondansetron Injectable 4 milliGRAM(s) IV Push every 8 hours PRN  vancomycin  IVPB 1000 milliGRAM(s) IV Intermittent every 12 hours      SOCIAL HISTORY:  Smoker:  YES , 3-4 cigs / day  ETOH use:  YES, socially only  Ilicit Drug use:  no    FAMILY HISTORY: not contributory      VITALS:  Vital Signs Last 24 Hrs  T(C): 37.3 (12 Apr 2024 15:32), Max: 38.6 (12 Apr 2024 06:22)  T(F): 99.2 (12 Apr 2024 15:32), Max: 101.4 (12 Apr 2024 06:22)  HR: 75 (12 Apr 2024 16:37) (64 - 87)  BP: 131/77 (12 Apr 2024 16:37) (96/55 - 131/77)  BP(mean): 73 (12 Apr 2024 12:52) (73 - 73)  RR: 18 (12 Apr 2024 15:32) (16 - 18)  SpO2: 95% (12 Apr 2024 15:32) (95% - 96%)    Parameters below as of 12 Apr 2024 15:32  Patient On (Oxygen Delivery Method): room air        LABS/DIAGNOSTIC TESTS:                          11.5   16.26 )-----------( 276      ( 12 Apr 2024 15:53 )             34.2     WBC Count: 16.26 K/uL (04-12 @ 15:53)  WBC Count: 20.24 K/uL (04-12 @ 05:40)  WBC Count: 24.80 K/uL (04-11 @ 14:45)      04-12    139  |  110<H>  |  17  ----------------------------<  135<H>  3.6   |  25  |  0.63    Ca    9.1      12 Apr 2024 15:53  Phos  3.2     04-12  Mg     2.0     04-12    TPro  6.3  /  Alb  2.5<L>  /  TBili  0.6  /  DBili  x   /  AST  72<H>  /  ALT  109<H>  /  AlkPhos  182<H>  04-12      uUrine Microscopic-Add On (NC) (04.11.24 @ 14:54)   Red Blood Cell - Urine: 2 /HPF  White Blood Cell - Urine: 5 /HPF  Bacteria: Occasional /HPF  Squamous Epithelial Cells: PresentUrinalysis with Rflx Culture (04.11.24 @ 14:54)   Urine Appearance: Clear  Color: Dark Yellow  Specific Gravity: 1.024  pH Urine: 5.5  Protein, Urine: 100 mg/dL  Glucose Qualitative, Urine: 500 mg/dL  Ketone - Urine: Trace mg/dL  Blood, Urine: Negative  Bilirubin: Small  Urobilinogen: 1.0 mg/dL  Leukocyte Esterase Concentration: Negative  Nitrite: Negative      LIVER FUNCTIONS - ( 12 Apr 2024 15:53 )  Alb: 2.5 g/dL / Pro: 6.3 g/dL / ALK PHOS: 182 U/L / ALT: 109 U/L DA / AST: 72 U/L / GGT: x             PT/INR - ( 12 Apr 2024 15:53 )   PT: 13.4 sec;   INR: 1.18 ratio         PTT - ( 12 Apr 2024 15:53 )  PTT:33.9 sec    LACTATE:Lactate, Blood: 0.7 mmol/L (04-12 @ 15:53)      ABG -     CULTURES:   .Blood Blood-Peripheral  04-11 @ 14:45   Growth in aerobic bottle: Gram Positive Cocci in Clusters  --    Growth in aerobic bottle: Gram Positive Cocci in Clusters      .Blood Blood  04-11 @ 14:40   Growth in anaerobic bottle: Gram Positive Cocci in Clusters  Growth in aerobic bottle: Gram Positive Cocci in Clusters  Direct identification is available within approximately 3-5  hours either by Blood Panel Multiplexed PCR or Direct  MALDI-TOF. Details: [email or web address]  --  Blood Culture PCR          RADIOLOGY:< from: CT Foot w/ IV Cont, Left (04.11.24 @ 19:27) >  ACC: [id number] EXAM:  CT FOOT ONLY IC LT   ORDERED BY: UZIEL FERGUSON     PROCEDURE DATE:  04/11/2024          INTERPRETATION:  Exam Type: CT FOOT WITH IV CONTRAST LEFT  Exam Date and Time: 4/11/2024 7:27 PM  Indication: Left foot pain. Evaluate forabscess.  Comparison: Same-day foot and ankle x-rays.    TECHNIQUE:  Multiplanar CT images of the left foot were obtained after administration   of 90 cc of Omnipaque 350 (10 cc discarded).    FINDINGS:  Evaluation of the left foot demonstrate juxtaarticular erosive changes at   the tibiotalar joint asymmetric to the distal lateral margin of the   tibia. Extensive mineralization is identified within the tibiotalar   joint. Similar findings of mineralization is seen about the first MTP   joint as well as at some of the TMT articulations. However, discrete   osseous erosions are not identified at these locations. There is no   osseous destruction or aggressive periosteal reaction identified. No   fracture or dislocation.    There is degenerative arthrosis involving the naviculocuneiform   articulation. No advanced productive changes are seen across the Lisfranc   interval.    Within the soft tissues, there is diffuse swelling identified along the   dorsal foot. There is fluid about the first TMT joint which decompresses   along the dorsal surface of the midfoot/forefoot. This fluid collection   along the dorsal midfoot/forefoot measures approximately 2.5 cm in the AP   dimension and approximately 2.9 cm in the transverse dimension. There is   peripheral enhancement with lack of central enhancement.    Mineralization is seen involving the distal insertional fibers of the   Achilles tendon as well as within the posterior tibialis tendon.   Otherwise, the imaged tendons are grossly intact.      IMPRESSION:  1.  Findings of gouty arthropathy about the foot as described. No CT   findings to suggest osteomyelitis.  2.  Peripheral enhancing fluid collection seen about the dorsal foot   about the first TMT joint as described is also felt to related to the   gouty arthropathy. An abscess is a less likely consideration but cannot   be entirely excluded on this examination.    --- End of Report ---            DAXA OVIEDO MD; Attending Radiologist  This document has been electronically signed. Apr 11 2024  8:16PM    < end of copied text >  ----------------------------------------------------------------------------------------------------------------------------------------------------  ACC: [id number] EXAM:  US DPLX LWR EXT VEINS LTD LT   ORDERED BY: ZAID CURRY     PROCEDURE DATE:  04/11/2024          INTERPRETATION:  CLINICAL INFORMATION: Left foot pain    COMPARISON: None available.    TECHNIQUE: Duplex sonography of the LEFT LOWER extremity veins with color   and spectral Doppler, with and without compression.    FINDINGS:    There is normal compressibility of the left common femoral, femoral and   popliteal veins.  The contralateral common femoral vein is patent.  Doppler examination shows normal spontaneous and phasic flow.    No calf vein thrombosis is detected.    IMPRESSION:  No evidence of left lower extremity deep venous thrombosis.            --- End of Report ---            ANGELITO MCDANIEL MD; Attending Radiologist  This document has been electronically signed. Apr 11 2024  3:54PM    < end of copied text >  ---------------------------------------------------------------------------------------------------------------------------------------------------  ACC: [id number] EXAM:  XR FOOT COMP MIN 3 VIEWS LT   ORDERED BY: ZAID CURRY     ACC: [id number] EXAM:  XR ANKLE COMP MIN 3 VIEWS BI   ORDERED BY: ZAID CURRY     PROCEDURE DATE:  04/11/2024          INTERPRETATION:  Bilateral ankles and left foot. Patient has local pain   and swelling.    COMPARISON: None available.    Left ankle. 3 views.    Arterial calcifications are noted.    There are some calcifications starting posterior to the posterior   calcaneus proceeding superiorly. There is mild degeneration of the   malleolar tips. No fracture.    Right ankle. 3 views.    There has been talar calcaneal fusion and also fusion of the lower fibula   with these 2 bones. Orthopedic hardware is seen over the lower medial   tibial area.    There is an inferior calcaneal spur.    No bone destruction or acute fracture.    Left foot. 3 views.    There is mild first MTP degeneration and mild swelling of the dorsum of   the foot. No fracture.    IMPRESSION: No acute fracture. Mild swelling dorsum left foot. Old fusion   of the right ankle joint with some orthopedic hardware.    --- End of Report ---            ACRMINE PAREKH MD; Attending Radiologist  This document has been electronically signed. Apr 11 2024  3:42PM    < end of copied text >        ROS  [  ] UNABLE TO ELICIT

## 2024-04-12 NOTE — CONSULT NOTE ADULT - GASTROINTESTINAL
Problem: Wound:  Goal: Will show signs of wound healing; wound closure and no evidence of infection  Outcome: Ongoing details… soft/nontender/nondistended/normal active bowel sounds/no guarding/no rigidity/no organomegaly/no masses palpable

## 2024-04-12 NOTE — PROGRESS NOTE ADULT - SUBJECTIVE AND OBJECTIVE BOX
Interval: Patient was seen resting bedisde, patient does not have much pain today. Left foot still red and warm.     Patient is a 73y old  Male who presents with a chief complaint of left foot pain    HPI:73-year-old male with past medical history hyperlipidemia, prediabetes, SLE on hydroxychloroquine presents with left foot pain over the past 4 days.  Patient states he stepped on a stone 4 days ago, states shortly afterwards he start experiencing left foot redness, swelling, pain, fevers, and chills.  Patient report taking ibuprofen with little relief.  Patient seen by primary care doctor today, advised to the emergency department further evaluation.  Denies any nasal congestion, cough, chest pain, shortness of breath, abdominal pain, vomiting, numbness, or weakness.  Denies any additional complaints.      Podiatry HPI: 73-year-old male with past medical history hyperlipidemia, prediabetes, SLE on hydroxychloroquine presents with left foot pain over the past 4 days. Patient states that one monday he was walking and stepped on a stone. Patient states later that day he began to develop pain and fevers and chills. Patient states that this entire week his foot has become increasingly swollen and he has been unable to walk. Patient states he has had fevers and chills all week long. Patient went to his PCP who gave him pain meds and recommened he come to ED for further workup.     PMH:Osteoarthritis    Osteoarthritis of left knee    High cholesterol      Allergies: No Known Allergies      Medications acetaminophen     Tablet .. 650 milliGRAM(s) Oral every 6 hours PRN  colchicine 0.6 milliGRAM(s) Oral <User Schedule>  enoxaparin Injectable 40 milliGRAM(s) SubCutaneous every 24 hours  hydroxychloroquine 200 milliGRAM(s) Oral daily  lactated ringers. 1000 milliLiter(s) IV Continuous <Continuous>  losartan 25 milliGRAM(s) Oral daily  ondansetron Injectable 4 milliGRAM(s) IV Push every 8 hours PRN  vancomycin  IVPB 1000 milliGRAM(s) IV Intermittent every 12 hours    FH,   PMHOsteoarthritis    Osteoarthritis of left knee    High cholesterol       PSHNo significant past surgical history    H/O foot surgery        Labs                          11.4   20.24 )-----------( 257      ( 12 Apr 2024 05:40 )             34.0      04-12    138  |  107  |  18  ----------------------------<  155<H>  3.4<L>   |  20<L>  |  0.67    Ca    8.7      12 Apr 2024 05:40  Phos  3.2     04-12  Mg     2.0     04-12    TPro  7.3  /  Alb  3.2<L>  /  TBili  1.1  /  DBili  x   /  AST  60<H>  /  ALT  89<H>  /  AlkPhos  141<H>  04-11     Vital Signs Last 24 Hrs  T(C): 36.9 (12 Apr 2024 08:05), Max: 38.6 (12 Apr 2024 06:22)  T(F): 98.5 (12 Apr 2024 08:05), Max: 101.4 (12 Apr 2024 06:22)  HR: 87 (12 Apr 2024 05:34) (74 - 100)  BP: 108/58 (12 Apr 2024 05:34) (96/55 - 136/74)  BP(mean): 73 (11 Apr 2024 22:22) (73 - 73)  RR: 16 (12 Apr 2024 05:34) (14 - 18)  SpO2: 96% (12 Apr 2024 05:34) (95% - 97%)    Parameters below as of 12 Apr 2024 05:34  Patient On (Oxygen Delivery Method): room air      Sedimentation Rate, Erythrocyte: 86 mm/Hr (04-11-24 @ 14:45)         C-Reactive Protein, Serum: 279 mg/L (04-11-24 @ 14:45)   WBC Count: 20.24 K/uL *H* (04-12-24 @ 05:40)  WBC Count: 24.80 K/uL *H* (04-11-24 @ 14:45)      ROS  REVIEW OF SYSTEMS:    CONSTITUTIONAL: No fever, weight loss, or fatigue  EYES: No eye pain, visual disturbances, or discharge  ENMT:  No difficulty hearing, tinnitus, vertigo; No sinus or throat pain  NECK: No pain or stiffness  BREASTS: No pain, masses, or nipple discharge  RESPIRATORY: No cough, wheezing, chills or hemoptysis; No shortness of breath  CARDIOVASCULAR: No chest pain, palpitations, dizziness, or leg swelling  GASTROINTESTINAL: No abdominal or epigastric pain. No nausea, vomiting, or hematemesis; No diarrhea or constipation. No melena or hematochezia.  GENITOURINARY: No dysuria, frequency, hematuria, or incontinence  NEUROLOGICAL: No headaches, memory loss, loss of strength, numbness, or tremors  SKIN: No itching, burning, rashes, or lesions   LYMPH NODES: No enlarged glands  ENDOCRINE: No heat or cold intolerance; No hair loss  MUSCULOSKELETAL: No joint pain or swelling; No muscle, back, or extremity pain  PSYCHIATRIC: No depression, anxiety, mood swings, or difficulty sleeping  HEME/LYMPH: No easy bruising, or bleeding gums  ALLERY AND IMMUNOLOGIC: No hives or eczema      PHYSICAL EXAM  LE Focused:    Vasc: DP & PT palpable right foot. Left foot PT palpable, DP non palpable 2/2 edema. CFT <3 seconds to all 10 digits b/l. TG warm to warm left foot.   Derm: Left foot increased ecchymosis noted to the dorsal aspect of the foot. Erythema, and edema noted to the left foot. No signs of any open lesions noted. Increased temperature noted to the dorsum of the left foot. No fluctuance noted over the dorsal aspect. No soft tissue crepitus noted.  Neuro: Gross and light touch sensation intact b/l  MSK: PTP to the left foot       IMAGING: ?xray    < from: US Duplex Venous Lower Ext Ltd, Left (04.11.24 @ 15:49) >  PROCEDURE DATE:  04/11/2024          INTERPRETATION:  CLINICAL INFORMATION: Left foot pain    COMPARISON: None available.    TECHNIQUE: Duplex sonography of the LEFT LOWER extremity veins with color   and spectral Doppler, with and without compression.    FINDINGS:    There is normal compressibility of the left common femoral, femoral and   popliteal veins.  The contralateral common femoral vein is patent.  Doppler examination shows normal spontaneous and phasic flow.    No calf vein thrombosis is detected.    IMPRESSION:  No evidence of left lower extremity deep venous thrombosis.            < end of copied text >  < from: Xray Foot AP + Lateral + Oblique, Left (04.11.24 @ 15:29) >      < end of copied text >  < from: Xray Foot AP + Lateral + Oblique, Left (04.11.24 @ 15:29) >      INTERPRETATION:  Bilateral ankles and left foot. Patient has local pain   and swelling.    COMPARISON: None available.    Left ankle. 3 views.    Arterial calcifications are noted.    There are some calcifications starting posterior to the posterior   calcaneus proceeding superiorly. There is mild degeneration of the   malleolar tips. No fracture.    Right ankle. 3 views.    There has been talar calcaneal fusion and also fusion of the lower fibula   with these 2 bones. Orthopedic hardware is seen over the lower medial   tibial area.    There is an inferior calcaneal spur.    No bone destruction or acute fracture.    Left foot. 3 views.    There is mild first MTP degeneration and mild swelling of the dorsum of   the foot. No fracture.    IMPRESSION: No acute fracture. Mild swelling dorsum left foot. Old fusion   of the right ankle joint with some orthopedic hardware.    --- End of Report ---    < end of copied text >      < from: CT Foot w/ IV Cont, Left (04.11.24 @ 19:27) >    INTERPRETATION:  Exam Type: CT FOOT WITH IV CONTRAST LEFT  Exam Date and Time: 4/11/2024 7:27 PM  Indication: Left foot pain. Evaluate forabscess.  Comparison: Same-day foot and ankle x-rays.    TECHNIQUE:  Multiplanar CT images of the left foot were obtained after administration   of 90 cc of Omnipaque 350 (10 cc discarded).    FINDINGS:  Evaluation of the left foot demonstrate juxtaarticular erosive changes at   the tibiotalar joint asymmetric to the distal lateral margin of the   tibia. Extensive mineralization is identified within the tibiotalar   joint. Similar findings of mineralization is seen about the first MTP   joint as well as at some of the TMT articulations. However, discrete   osseous erosions are not identified at these locations. There is no   osseous destruction or aggressive periosteal reaction identified. No   fracture or dislocation.    There is degenerative arthrosis involving the naviculocuneiform   articulation. No advanced productive changes are seen across the Lisfranc   interval.    Within the soft tissues, there is diffuse swelling identified along the   dorsal foot. There is fluid about the first TMT joint which decompresses   along the dorsal surface of the midfoot/forefoot. This fluid collection   along the dorsal midfoot/forefoot measures approximately 2.5 cm in the AP   dimension and approximately 2.9 cm in the transverse dimension. There is   peripheral enhancement with lack of central enhancement.    Mineralization is seen involving the distal insertional fibers of the   Achilles tendon as well as within the posterior tibialis tendon.   Otherwise, the imaged tendons are grossly intact.      IMPRESSION:  1.  Findings of gouty arthropathy about the foot as described. No CT   findings to suggest osteomyelitis.  2.  Peripheral enhancing fluid collection seen about the dorsal foot   about the first TMT joint as described is also felt to related to the   gouty arthropathy. An abscess is a less likely consideration but cannot   be entirely excluded on this examination.    < end of copied text >      CULTURES:        Interval: Patient was seen resting bedisde. Written consent was obtained with witness present and bedside incision and drainage was performed over the left foot dorsal wound.     Patient is a 73y old  Male who presents with a chief complaint of left foot pain    HPI:73-year-old male with past medical history hyperlipidemia, prediabetes, SLE on hydroxychloroquine presents with left foot pain over the past 4 days.  Patient states he stepped on a stone 4 days ago, states shortly afterwards he start experiencing left foot redness, swelling, pain, fevers, and chills.  Patient report taking ibuprofen with little relief.  Patient seen by primary care doctor today, advised to the emergency department further evaluation.  Denies any nasal congestion, cough, chest pain, shortness of breath, abdominal pain, vomiting, numbness, or weakness.  Denies any additional complaints.      Podiatry HPI: 73-year-old male with past medical history hyperlipidemia, prediabetes, SLE on hydroxychloroquine presents with left foot pain over the past 4 days. Patient states that one monday he was walking and stepped on a stone. Patient states later that day he began to develop pain and fevers and chills. Patient states that this entire week his foot has become increasingly swollen and he has been unable to walk. Patient states he has had fevers and chills all week long. Patient went to his PCP who gave him pain meds and recommened he come to ED for further workup.     PMH:Osteoarthritis    Osteoarthritis of left knee    High cholesterol      Allergies: No Known Allergies      Medications acetaminophen     Tablet .. 650 milliGRAM(s) Oral every 6 hours PRN  colchicine 0.6 milliGRAM(s) Oral <User Schedule>  enoxaparin Injectable 40 milliGRAM(s) SubCutaneous every 24 hours  hydroxychloroquine 200 milliGRAM(s) Oral daily  lactated ringers. 1000 milliLiter(s) IV Continuous <Continuous>  losartan 25 milliGRAM(s) Oral daily  ondansetron Injectable 4 milliGRAM(s) IV Push every 8 hours PRN  vancomycin  IVPB 1000 milliGRAM(s) IV Intermittent every 12 hours    FH,   PMHOsteoarthritis    Osteoarthritis of left knee    High cholesterol       PSHNo significant past surgical history    H/O foot surgery        Labs                          11.4   20.24 )-----------( 257      ( 12 Apr 2024 05:40 )             34.0      04-12    138  |  107  |  18  ----------------------------<  155<H>  3.4<L>   |  20<L>  |  0.67    Ca    8.7      12 Apr 2024 05:40  Phos  3.2     04-12  Mg     2.0     04-12    TPro  7.3  /  Alb  3.2<L>  /  TBili  1.1  /  DBili  x   /  AST  60<H>  /  ALT  89<H>  /  AlkPhos  141<H>  04-11     Vital Signs Last 24 Hrs  T(C): 36.9 (12 Apr 2024 08:05), Max: 38.6 (12 Apr 2024 06:22)  T(F): 98.5 (12 Apr 2024 08:05), Max: 101.4 (12 Apr 2024 06:22)  HR: 87 (12 Apr 2024 05:34) (74 - 100)  BP: 108/58 (12 Apr 2024 05:34) (96/55 - 136/74)  BP(mean): 73 (11 Apr 2024 22:22) (73 - 73)  RR: 16 (12 Apr 2024 05:34) (14 - 18)  SpO2: 96% (12 Apr 2024 05:34) (95% - 97%)    Parameters below as of 12 Apr 2024 05:34  Patient On (Oxygen Delivery Method): room air      Sedimentation Rate, Erythrocyte: 86 mm/Hr (04-11-24 @ 14:45)         C-Reactive Protein, Serum: 279 mg/L (04-11-24 @ 14:45)   WBC Count: 20.24 K/uL *H* (04-12-24 @ 05:40)  WBC Count: 24.80 K/uL *H* (04-11-24 @ 14:45)      ROS  REVIEW OF SYSTEMS:    CONSTITUTIONAL: No fever, weight loss, or fatigue  EYES: No eye pain, visual disturbances, or discharge  ENMT:  No difficulty hearing, tinnitus, vertigo; No sinus or throat pain  NECK: No pain or stiffness  BREASTS: No pain, masses, or nipple discharge  RESPIRATORY: No cough, wheezing, chills or hemoptysis; No shortness of breath  CARDIOVASCULAR: No chest pain, palpitations, dizziness, or leg swelling  GASTROINTESTINAL: No abdominal or epigastric pain. No nausea, vomiting, or hematemesis; No diarrhea or constipation. No melena or hematochezia.  GENITOURINARY: No dysuria, frequency, hematuria, or incontinence  NEUROLOGICAL: No headaches, memory loss, loss of strength, numbness, or tremors  SKIN: No itching, burning, rashes, or lesions   LYMPH NODES: No enlarged glands  ENDOCRINE: No heat or cold intolerance; No hair loss  MUSCULOSKELETAL: No joint pain or swelling; No muscle, back, or extremity pain  PSYCHIATRIC: No depression, anxiety, mood swings, or difficulty sleeping  HEME/LYMPH: No easy bruising, or bleeding gums  ALLERY AND IMMUNOLOGIC: No hives or eczema      PHYSICAL EXAM  LE Focused:    Vasc: DP & PT palpable right foot. Left foot PT palpable, DP non palpable 2/2 edema. CFT <3 seconds to all 10 digits b/l. TG warm to warm left foot.   Derm: Left foot increased ecchymosis noted to the dorsal aspect of the foot. Erythema, and edema noted to the left foot. No signs of any open lesions noted. Increased temperature noted to the dorsum of the left foot. No fluctuance noted over the dorsal aspect. No soft tissue crepitus noted.  Neuro: Gross and light touch sensation intact b/l  MSK: PTP to the left foot       IMAGING: ?xray    < from: US Duplex Venous Lower Ext Ltd, Left (04.11.24 @ 15:49) >  PROCEDURE DATE:  04/11/2024          INTERPRETATION:  CLINICAL INFORMATION: Left foot pain    COMPARISON: None available.    TECHNIQUE: Duplex sonography of the LEFT LOWER extremity veins with color   and spectral Doppler, with and without compression.    FINDINGS:    There is normal compressibility of the left common femoral, femoral and   popliteal veins.  The contralateral common femoral vein is patent.  Doppler examination shows normal spontaneous and phasic flow.    No calf vein thrombosis is detected.    IMPRESSION:  No evidence of left lower extremity deep venous thrombosis.            < end of copied text >  < from: Xray Foot AP + Lateral + Oblique, Left (04.11.24 @ 15:29) >      < end of copied text >  < from: Xray Foot AP + Lateral + Oblique, Left (04.11.24 @ 15:29) >      INTERPRETATION:  Bilateral ankles and left foot. Patient has local pain   and swelling.    COMPARISON: None available.    Left ankle. 3 views.    Arterial calcifications are noted.    There are some calcifications starting posterior to the posterior   calcaneus proceeding superiorly. There is mild degeneration of the   malleolar tips. No fracture.    Right ankle. 3 views.    There has been talar calcaneal fusion and also fusion of the lower fibula   with these 2 bones. Orthopedic hardware is seen over the lower medial   tibial area.    There is an inferior calcaneal spur.    No bone destruction or acute fracture.    Left foot. 3 views.    There is mild first MTP degeneration and mild swelling of the dorsum of   the foot. No fracture.    IMPRESSION: No acute fracture. Mild swelling dorsum left foot. Old fusion   of the right ankle joint with some orthopedic hardware.    --- End of Report ---    < end of copied text >      < from: CT Foot w/ IV Cont, Left (04.11.24 @ 19:27) >    INTERPRETATION:  Exam Type: CT FOOT WITH IV CONTRAST LEFT  Exam Date and Time: 4/11/2024 7:27 PM  Indication: Left foot pain. Evaluate forabscess.  Comparison: Same-day foot and ankle x-rays.    TECHNIQUE:  Multiplanar CT images of the left foot were obtained after administration   of 90 cc of Omnipaque 350 (10 cc discarded).    FINDINGS:  Evaluation of the left foot demonstrate juxtaarticular erosive changes at   the tibiotalar joint asymmetric to the distal lateral margin of the   tibia. Extensive mineralization is identified within the tibiotalar   joint. Similar findings of mineralization is seen about the first MTP   joint as well as at some of the TMT articulations. However, discrete   osseous erosions are not identified at these locations. There is no   osseous destruction or aggressive periosteal reaction identified. No   fracture or dislocation.    There is degenerative arthrosis involving the naviculocuneiform   articulation. No advanced productive changes are seen across the Lisfranc   interval.    Within the soft tissues, there is diffuse swelling identified along the   dorsal foot. There is fluid about the first TMT joint which decompresses   along the dorsal surface of the midfoot/forefoot. This fluid collection   along the dorsal midfoot/forefoot measures approximately 2.5 cm in the AP   dimension and approximately 2.9 cm in the transverse dimension. There is   peripheral enhancement with lack of central enhancement.    Mineralization is seen involving the distal insertional fibers of the   Achilles tendon as well as within the posterior tibialis tendon.   Otherwise, the imaged tendons are grossly intact.      IMPRESSION:  1.  Findings of gouty arthropathy about the foot as described. No CT   findings to suggest osteomyelitis.  2.  Peripheral enhancing fluid collection seen about the dorsal foot   about the first TMT joint as described is also felt to related to the   gouty arthropathy. An abscess is a less likely consideration but cannot   be entirely excluded on this examination.    < end of copied text >      CULTURES:

## 2024-04-12 NOTE — PROGRESS NOTE ADULT - SUBJECTIVE AND OBJECTIVE BOX
-*-*- INCOMPLETE  NP Note discussed with  primary attending    Patient is a 73y old  Male who presents with a chief complaint of right foot cellulitis (12 Apr 2024 11:58)      INTERVAL HPI/OVERNIGHT EVENTS: febrile overnight BCX + growing gram + cocci in clusters     MEDICATIONS  (STANDING):  colchicine 0.6 milliGRAM(s) Oral <User Schedule>  enoxaparin Injectable 40 milliGRAM(s) SubCutaneous every 24 hours  lactated ringers. 1000 milliLiter(s) (75 mL/Hr) IV Continuous <Continuous>  sodium chloride 0.9% Bolus 2100 milliLiter(s) IV Bolus once  vancomycin  IVPB 1000 milliGRAM(s) IV Intermittent every 12 hours    MEDICATIONS  (PRN):  acetaminophen     Tablet .. 650 milliGRAM(s) Oral every 6 hours PRN Temp greater or equal to 38C (100.4F), Mild Pain (1 - 3)  ondansetron Injectable 4 milliGRAM(s) IV Push every 8 hours PRN Nausea and/or Vomiting      __________________________________________________  REVIEW OF SYSTEMS:    CONSTITUTIONAL: +fever,   EYES: no acute visual disturbances  NECK: No pain or stiffness  RESPIRATORY: No cough; No shortness of breath  CARDIOVASCULAR: No chest pain, no palpitations  GASTROINTESTINAL: No pain. No nausea or vomiting; No diarrhea   NEUROLOGICAL: No headache or numbness, no tremors  MUSCULOSKELETAL: No joint pain, no muscle pain  GENITOURINARY: no dysuria, no frequency, no hesitancy  PSYCHIATRY: no depression , no anxiety  ALL OTHER  ROS negative        Vital Signs Last 24 Hrs  T(C): 37.3 (12 Apr 2024 15:32), Max: 38.6 (12 Apr 2024 06:22)  T(F): 99.2 (12 Apr 2024 15:32), Max: 101.4 (12 Apr 2024 06:22)  HR: 64 (12 Apr 2024 15:32) (64 - 87)  BP: 98/55 (12 Apr 2024 15:32) (96/55 - 108/69)  BP(mean): 73 (12 Apr 2024 12:52) (73 - 73)  RR: 18 (12 Apr 2024 15:32) (14 - 18)  SpO2: 95% (12 Apr 2024 15:32) (95% - 96%)    Parameters below as of 12 Apr 2024 15:32  Patient On (Oxygen Delivery Method): room air        ________________________________________________  PHYSICAL EXAM:  GENERAL: NAD  HEENT: Normocephalic;  conjunctivae and sclerae clear;  NECK : supple  CHEST/LUNG: Clear to ausculitation bilaterally with good air entry   HEART: S1 S2  regular; no murmurs, gallops or rubs  ABDOMEN: Soft, Nontender, Nondistended; Bowel sounds present  EXTREMITIES: right dorsum of foot +   SKIN: warm and dry; no rash  NERVOUS SYSTEM:  Awake and alert; Oriented  to place, person and time ;    _________________________________________________  LABS:                        11.4   20.24 )-----------( 257      ( 12 Apr 2024 05:40 )             34.0     04-12    138  |  107  |  18  ----------------------------<  155<H>  3.4<L>   |  20<L>  |  0.67    Ca    8.7      12 Apr 2024 05:40  Phos  3.2     04-12  Mg     2.0     04-12    TPro  7.3  /  Alb  3.2<L>  /  TBili  1.1  /  DBili  x   /  AST  60<H>  /  ALT  89<H>  /  AlkPhos  141<H>  04-11    PT/INR - ( 11 Apr 2024 14:45 )   PT: 13.6 sec;   INR: 1.20 ratio         PTT - ( 11 Apr 2024 14:45 )  PTT:32.0 sec  Urinalysis Basic - ( 12 Apr 2024 05:40 )    Color: x / Appearance: x / SG: x / pH: x  Gluc: 155 mg/dL / Ketone: x  / Bili: x / Urobili: x   Blood: x / Protein: x / Nitrite: x   Leuk Esterase: x / RBC: x / WBC x   Sq Epi: x / Non Sq Epi: x / Bacteria: x      CAPILLARY BLOOD GLUCOSE            RADIOLOGY & ADDITIONAL TESTS:    Imaging Personally Reviewed:  YES/NO    Consultant(s) Notes Reviewed:   YES/ No    Care Discussed with Consultants :     Plan of care was discussed with patient and /or primary care giver; all questions and concerns were addressed and care was aligned with patient's wishes.     NP Note discussed with  primary attending    Patient is a 73y old  Male who presents with a chief complaint of right foot cellulitis (12 Apr 2024 11:58)      INTERVAL HPI/OVERNIGHT EVENTS: febrile overnight BCX + growing gram + cocci in clusters     MEDICATIONS  (STANDING):  colchicine 0.6 milliGRAM(s) Oral <User Schedule>  enoxaparin Injectable 40 milliGRAM(s) SubCutaneous every 24 hours  lactated ringers. 1000 milliLiter(s) (75 mL/Hr) IV Continuous <Continuous>  sodium chloride 0.9% Bolus 2100 milliLiter(s) IV Bolus once  vancomycin  IVPB 1000 milliGRAM(s) IV Intermittent every 12 hours    MEDICATIONS  (PRN):  acetaminophen     Tablet .. 650 milliGRAM(s) Oral every 6 hours PRN Temp greater or equal to 38C (100.4F), Mild Pain (1 - 3)  ondansetron Injectable 4 milliGRAM(s) IV Push every 8 hours PRN Nausea and/or Vomiting      __________________________________________________  REVIEW OF SYSTEMS:    CONSTITUTIONAL: +fever,   EYES: no acute visual disturbances  NECK: No pain or stiffness  RESPIRATORY: No cough; No shortness of breath  CARDIOVASCULAR: No chest pain, no palpitations  GASTROINTESTINAL: No pain. No nausea or vomiting; No diarrhea   NEUROLOGICAL: No headache or numbness, no tremors  MUSCULOSKELETAL: No joint pain, no muscle pain  GENITOURINARY: no dysuria, no frequency, no hesitancy  PSYCHIATRY: no depression , no anxiety  ALL OTHER  ROS negative        Vital Signs Last 24 Hrs  T(C): 37.3 (12 Apr 2024 15:32), Max: 38.6 (12 Apr 2024 06:22)  T(F): 99.2 (12 Apr 2024 15:32), Max: 101.4 (12 Apr 2024 06:22)  HR: 64 (12 Apr 2024 15:32) (64 - 87)  BP: 98/55 (12 Apr 2024 15:32) (96/55 - 108/69)  BP(mean): 73 (12 Apr 2024 12:52) (73 - 73)  RR: 18 (12 Apr 2024 15:32) (14 - 18)  SpO2: 95% (12 Apr 2024 15:32) (95% - 96%)    Parameters below as of 12 Apr 2024 15:32  Patient On (Oxygen Delivery Method): room air        ________________________________________________  PHYSICAL EXAM:  GENERAL: NAD  HEENT: Normocephalic;  conjunctivae and sclerae clear;  NECK : supple  CHEST/LUNG: Clear to ausculitation bilaterally with good air entry   HEART: S1 S2  regular; no murmurs, gallops or rubs  ABDOMEN: Soft, Nontender, Nondistended; Bowel sounds present  EXTREMITIES: right dorsum of foot + edema + erythema   SKIN: warm and dry; no rash  NERVOUS SYSTEM:  Awake and alert; Oriented  to place, person and time ;    _________________________________________________  LABS:                        11.4   20.24 )-----------( 257      ( 12 Apr 2024 05:40 )             34.0     04-12    138  |  107  |  18  ----------------------------<  155<H>  3.4<L>   |  20<L>  |  0.67    Ca    8.7      12 Apr 2024 05:40  Phos  3.2     04-12  Mg     2.0     04-12    TPro  7.3  /  Alb  3.2<L>  /  TBili  1.1  /  DBili  x   /  AST  60<H>  /  ALT  89<H>  /  AlkPhos  141<H>  04-11    PT/INR - ( 11 Apr 2024 14:45 )   PT: 13.6 sec;   INR: 1.20 ratio         PTT - ( 11 Apr 2024 14:45 )  PTT:32.0 sec  Urinalysis Basic - ( 12 Apr 2024 05:40 )    Color: x / Appearance: x / SG: x / pH: x  Gluc: 155 mg/dL / Ketone: x  / Bili: x / Urobili: x   Blood: x / Protein: x / Nitrite: x   Leuk Esterase: x / RBC: x / WBC x   Sq Epi: x / Non Sq Epi: x / Bacteria: x      CAPILLARY BLOOD GLUCOSE        RADIOLOGY & ADDITIONAL TESTS:   < from: CT Foot w/ IV Cont, Left (04.11.24 @ 19:27) >    IMPRESSION:  1.  Findings of gouty arthropathy about the foot as described. No CT   findings to suggest osteomyelitis.  2.  Peripheral enhancing fluid collection seen about the dorsal foot   about the first TMT joint as described is also felt to related to the   gouty arthropathy. An abscess is a less likely consideration but cannot   be entirely excluded on this examination.    --- End of Report ---    < end of copied text >    < from: US Duplex Venous Lower Ext Ltd, Left (04.11.24 @ 15:49) >  IMPRESSION:  No evidence of left lower extremity deep venous thrombosis.            --- End of Report ---    < end of copied text >< from: Xray Foot AP + Lateral + Oblique, Left (04.11.24 @ 15:29) >    IMPRESSION: No acute fracture. Mild swelling dorsum left foot. Old fusion   of the right ankle joint with some orthopedic hardware.    --- End of Report ---      < end of copied text >    < from: Xray Ankle Complete 3 Views, Bilateral (04.11.24 @ 15:23) >    IMPRESSION: No acute fracture. Mild swelling dorsum left foot. Old fusion   of the right ankle joint with some orthopedic hardware.    --- End of Report ---    < end of copied text >  Imaging Personally Reviewed:  YES    Consultant(s) Notes Reviewed:   YES    Care Discussed with Consultants: podiatry, ID    Plan of care was discussed with patient and /or primary care giver; all questions and concerns were addressed and care was aligned with patient's wishes.

## 2024-04-12 NOTE — PROGRESS NOTE ADULT - ASSESSMENT
73 y.o. M with PMHx of HLD, prediabetes, SLE on hydroxychloroquine presents with left foot pain over the past 4 days. Admitted to medicine for L foot cellultis 73 y.o. M from home with PMHx of HLD, prediabetes, SLE on hydroxychloroquine presents with left foot pain over the past 4 days. Admitted to medicine for L foot cellultis, found with gram + cocci in clusters   ID Nasreen and podiatry consulted. Remains on IV Vanco, pending bedside i&d

## 2024-04-12 NOTE — PROGRESS NOTE ADULT - PROBLEM SELECTOR PLAN 3
h/o HTN   Monitor BP  c/w home meds -  BP target <130/90 mmHg  DASH/TLC diet  Adjust medications as needed to meet target. c/w hydroxychloroquine

## 2024-04-12 NOTE — PROGRESS NOTE ADULT - ASSESSMENT
A:  Left foot gout vs SLE flare up vs early Charcot vs cellulitis     P:   Patient evaluated and Chart reviewed   Discussed diagnosis and treatment with patient  X-rays evaluated, results as noted above  CT results reviewed   Continue with IV antibiotics As Per ID  Pending A1C%  Reviewed uric acid  Recommend rheumatology consult   Applied ace bandage left foot   Weight bearing as tolerated left foot   Podiatry to follow while in house.    Seen, Discussed and reviewed with Dr. Urena   Discussed with Attending Dr. Valencia  A:  Left foot infected foot    P:   Patient evaluated and Chart reviewed   Discussed diagnosis and treatment with patient  X-rays evaluated, results as noted above  CT results reviewed   Continue with IV antibiotics As Per ID  Pending A1C%  Reviewed uric acid  Written consent was obtained with witness present. 10 CC of 1% Lidocaine was anesthetized in an ankle block fashion. Using a #15 blade approximately 1 cm incision was made over the dorsal fluctuance on the left foot. Using a curved hemostat the subcutaneous tissue was explored. Approx 10 cc of liquefactive drainage was expressed from the wound. Wound was flushed with copious amounts of saline. Wound was left open to allow drainage to exit the incision site.   Wound was dressed with betadine 4x4, ABD, kerlix, ace bandage.    Recommend rheumatology consult   Weight bearing as tolerated left foot   Podiatry to follow while in house.    Seen, Discussed and reviewed with Dr. Urena   Discussed with Attending Dr. Valencia

## 2024-04-12 NOTE — CONSULT NOTE ADULT - ASSESSMENT
A:  Left foot gout vs SLE flare up vs early Charcot vs cellulitis     P:   Patient evaluated and Chart reviewed   Discussed diagnosis and treatment with patient  X-rays evaluated, results as noted above  Continue with IV antibiotics As Per ID  Ordered A1C%  Ordered uric acid   Recommend rheumatology consult   Applied ace bandage left foot   Weight bearing as tolerated left foot   Podiatry to follow while in house.  Discussed with Attending Dr. Valencia 
Left Foot Cellulitis / Abscess - s/ p I&D  Fevers  Leukocytosis - decreasing   Bacteremia - likely contaminant    Plan - Cont Vancomycin 1 gm iv q12hrs pending culture results of abscess  repeat blood cultures.

## 2024-04-13 ENCOUNTER — RESULT REVIEW (OUTPATIENT)
Age: 74
End: 2024-04-13

## 2024-04-13 LAB
A1C WITH ESTIMATED AVERAGE GLUCOSE RESULT: 6.4 % — HIGH (ref 4–5.6)
ALBUMIN SERPL ELPH-MCNC: 2.5 G/DL — LOW (ref 3.5–5)
ALP SERPL-CCNC: 179 U/L — HIGH (ref 40–120)
ALT FLD-CCNC: 90 U/L DA — HIGH (ref 10–60)
ANION GAP SERPL CALC-SCNC: 7 MMOL/L — SIGNIFICANT CHANGE UP (ref 5–17)
AST SERPL-CCNC: 43 U/L — HIGH (ref 10–40)
BILIRUB SERPL-MCNC: 0.6 MG/DL — SIGNIFICANT CHANGE UP (ref 0.2–1.2)
BUN SERPL-MCNC: 11 MG/DL — SIGNIFICANT CHANGE UP (ref 7–18)
CALCIUM SERPL-MCNC: 8.6 MG/DL — SIGNIFICANT CHANGE UP (ref 8.4–10.5)
CHLORIDE SERPL-SCNC: 113 MMOL/L — HIGH (ref 96–108)
CO2 SERPL-SCNC: 23 MMOL/L — SIGNIFICANT CHANGE UP (ref 22–31)
CREAT SERPL-MCNC: 0.64 MG/DL — SIGNIFICANT CHANGE UP (ref 0.5–1.3)
EGFR: 101 ML/MIN/1.73M2 — SIGNIFICANT CHANGE UP
ESTIMATED AVERAGE GLUCOSE: 137 MG/DL — HIGH (ref 68–114)
GLUCOSE SERPL-MCNC: 126 MG/DL — HIGH (ref 70–99)
GRAM STN FLD: ABNORMAL
HCT VFR BLD CALC: 34.5 % — LOW (ref 39–50)
HGB BLD-MCNC: 11.4 G/DL — LOW (ref 13–17)
MCHC RBC-ENTMCNC: 30.9 PG — SIGNIFICANT CHANGE UP (ref 27–34)
MCHC RBC-ENTMCNC: 33 GM/DL — SIGNIFICANT CHANGE UP (ref 32–36)
MCV RBC AUTO: 93.5 FL — SIGNIFICANT CHANGE UP (ref 80–100)
NRBC # BLD: 0 /100 WBCS — SIGNIFICANT CHANGE UP (ref 0–0)
PLATELET # BLD AUTO: 271 K/UL — SIGNIFICANT CHANGE UP (ref 150–400)
POTASSIUM SERPL-MCNC: 4.1 MMOL/L — SIGNIFICANT CHANGE UP (ref 3.5–5.3)
POTASSIUM SERPL-SCNC: 4.1 MMOL/L — SIGNIFICANT CHANGE UP (ref 3.5–5.3)
PROT SERPL-MCNC: 6.1 G/DL — SIGNIFICANT CHANGE UP (ref 6–8.3)
RBC # BLD: 3.69 M/UL — LOW (ref 4.2–5.8)
RBC # FLD: 13.2 % — SIGNIFICANT CHANGE UP (ref 10.3–14.5)
SODIUM SERPL-SCNC: 143 MMOL/L — SIGNIFICANT CHANGE UP (ref 135–145)
VANCOMYCIN TROUGH SERPL-MCNC: 6.9 UG/ML — LOW (ref 10–20)
WBC # BLD: 15.98 K/UL — HIGH (ref 3.8–10.5)
WBC # FLD AUTO: 15.98 K/UL — HIGH (ref 3.8–10.5)

## 2024-04-13 PROCEDURE — 99233 SBSQ HOSP IP/OBS HIGH 50: CPT

## 2024-04-13 RX ORDER — VANCOMYCIN HCL 1 G
1250 VIAL (EA) INTRAVENOUS ONCE
Refills: 0 | Status: COMPLETED | OUTPATIENT
Start: 2024-04-13 | End: 2024-04-13

## 2024-04-13 RX ORDER — VANCOMYCIN HCL 1 G
1250 VIAL (EA) INTRAVENOUS EVERY 12 HOURS
Refills: 0 | Status: DISCONTINUED | OUTPATIENT
Start: 2024-04-13 | End: 2024-04-14

## 2024-04-13 RX ORDER — VANCOMYCIN HCL 1 G
VIAL (EA) INTRAVENOUS
Refills: 0 | Status: DISCONTINUED | OUTPATIENT
Start: 2024-04-13 | End: 2024-04-14

## 2024-04-13 RX ORDER — SODIUM CHLORIDE 9 MG/ML
500 INJECTION INTRAMUSCULAR; INTRAVENOUS; SUBCUTANEOUS ONCE
Refills: 0 | Status: COMPLETED | OUTPATIENT
Start: 2024-04-13 | End: 2024-04-13

## 2024-04-13 RX ADMIN — ENOXAPARIN SODIUM 40 MILLIGRAM(S): 100 INJECTION SUBCUTANEOUS at 06:38

## 2024-04-13 RX ADMIN — Medication 166.67 MILLIGRAM(S): at 06:40

## 2024-04-13 RX ADMIN — Medication 650 MILLIGRAM(S): at 12:49

## 2024-04-13 RX ADMIN — Medication 650 MILLIGRAM(S): at 07:30

## 2024-04-13 RX ADMIN — Medication 650 MILLIGRAM(S): at 13:19

## 2024-04-13 RX ADMIN — Medication 650 MILLIGRAM(S): at 06:47

## 2024-04-13 RX ADMIN — Medication 166.67 MILLIGRAM(S): at 17:16

## 2024-04-13 RX ADMIN — SODIUM CHLORIDE 500 MILLILITER(S): 9 INJECTION INTRAMUSCULAR; INTRAVENOUS; SUBCUTANEOUS at 08:26

## 2024-04-13 NOTE — PROGRESS NOTE ADULT - SUBJECTIVE AND OBJECTIVE BOX
INTERVAL HPI/OVERNIGHT EVENTS:   Accompanied by wife Saira who is helping with translation. patient feels well no complaints. No fever no chills. some persistent pain mostly improved    REVIEW OF SYSTEMS:  negative except per hpi    Vital Signs Last 24 Hrs  T(C): 37 (13 Apr 2024 14:08), Max: 38.2 (12 Apr 2024 18:59)  T(F): 98.6 (13 Apr 2024 14:08), Max: 100.7 (12 Apr 2024 18:59)  HR: 68 (13 Apr 2024 14:08) (64 - 76)  BP: 105/60 (13 Apr 2024 14:08) (98/55 - 133/64)  BP(mean): --  RR: 17 (13 Apr 2024 14:08) (17 - 18)  SpO2: 95% (13 Apr 2024 14:08) (95% - 96%)    Parameters below as of 13 Apr 2024 14:08  Patient On (Oxygen Delivery Method): room air        PHYSICAL EXAMINATION:  NAD, aaox3, accompanied by wife. Left foot some tenderness and warmth. Was just drained by podiatry. left knee scar. Abd soft nontender to palpation                        11.4   15.98 )-----------( 271      ( 13 Apr 2024 04:35 )             34.5     04-13    143  |  113<H>  |  11  ----------------------------<  126<H>  4.1   |  23  |  0.64    Ca    8.6      13 Apr 2024 04:35  Phos  3.2     04-12  Mg     2.0     04-12    TPro  6.1  /  Alb  2.5<L>  /  TBili  0.6  /  DBili  x   /  AST  43<H>  /  ALT  90<H>  /  AlkPhos  179<H>  04-13    LIVER FUNCTIONS - ( 13 Apr 2024 04:35 )  Alb: 2.5 g/dL / Pro: 6.1 g/dL / ALK PHOS: 179 U/L / ALT: 90 U/L DA / AST: 43 U/L / GGT: x               PT/INR - ( 12 Apr 2024 15:53 )   PT: 13.4 sec;   INR: 1.18 ratio         PTT - ( 12 Apr 2024 15:53 )  PTT:33.9 sec    CAPILLARY BLOOD GLUCOSE      RADIOLOGY & ADDITIONAL TESTS:

## 2024-04-13 NOTE — PROVIDER CONTACT NOTE (CRITICAL VALUE NOTIFICATION) - TEST AND RESULT REPORTED:
Growth in aerobic bottle: Gram Positive Cocci in Clusters  Growth in anaerobic bottle: Gram Positive Cocci in Clusters
b/c from 4/11 = growth in both anaerobic and aerobic bottle = gram positive cocci in clusters.
b/c from 4/11 preliminary report = growth in both aerobic and anaerobic = gram + cocci in clusters
b/c from 4/11 = growth in anaerobic bottle gram positive cocci in clusters

## 2024-04-13 NOTE — PROGRESS NOTE ADULT - ASSESSMENT
#Sepsis secondary to gram positive bacteremia  #Gouty arthritis  #SLE  #preDM  #HLD    Infection improving on vancomycin  Blood cultures with MRS Epidermidis and staph hominis, could be contamination  Discussed with ID, continue vancomycin, repeat cultures, followup surgical swab- podiatry drain further pus today 4/13/24  Prediabetes needs more effective lifestyle modifications, AM sugars appears well controlled, continue carb consistent diet  Unclear if abscess is related to joint per CT findings  Hydroxychloroquine was discontinued  Uric Acid level controlled

## 2024-04-13 NOTE — PROVIDER CONTACT NOTE (CRITICAL VALUE NOTIFICATION) - PERSON GIVING RESULT:
[facility]
Floridalma NUNEZ/ [facility]
Allie/[facility]
[place]/[facility]

## 2024-04-13 NOTE — PROGRESS NOTE ADULT - SUBJECTIVE AND OBJECTIVE BOX
Interval: Patient was seen resting bedisde. Patient stated he was in some pain from the incision and drainage yesterday. No other pedal complaints.     Patient is a 73y old  Male who presents with a chief complaint of left foot pain    HPI:73-year-old male with past medical history hyperlipidemia, prediabetes, SLE on hydroxychloroquine presents with left foot pain over the past 4 days.  Patient states he stepped on a stone 4 days ago, states shortly afterwards he start experiencing left foot redness, swelling, pain, fevers, and chills.  Patient report taking ibuprofen with little relief.  Patient seen by primary care doctor today, advised to the emergency department further evaluation.  Denies any nasal congestion, cough, chest pain, shortness of breath, abdominal pain, vomiting, numbness, or weakness.  Denies any additional complaints.      Podiatry HPI: 73-year-old male with past medical history hyperlipidemia, prediabetes, SLE on hydroxychloroquine presents with left foot pain over the past 4 days. Patient states that one monday he was walking and stepped on a stone. Patient states later that day he began to develop pain and fevers and chills. Patient states that this entire week his foot has become increasingly swollen and he has been unable to walk. Patient states he has had fevers and chills all week long. Patient went to his PCP who gave him pain meds and recommened he come to ED for further workup.   Medications acetaminophen     Tablet .. 650 milliGRAM(s) Oral every 6 hours PRN  enoxaparin Injectable 40 milliGRAM(s) SubCutaneous every 24 hours  lactated ringers. 1000 milliLiter(s) IV Continuous <Continuous>  ondansetron Injectable 4 milliGRAM(s) IV Push every 8 hours PRN  oxycodone    5 mG/acetaminophen 325 mG 1 Tablet(s) Oral every 4 hours PRN  vancomycin  IVPB      vancomycin  IVPB 1250 milliGRAM(s) IV Intermittent every 12 hours    FH,   PMHOsteoarthritis    Osteoarthritis of left knee    High cholesterol       PSHNo significant past surgical history    H/O foot surgery        Labs                          11.4   15.98 )-----------( 271      ( 13 Apr 2024 04:35 )             34.5      04-13    143  |  113<H>  |  11  ----------------------------<  126<H>  4.1   |  23  |  0.64    Ca    8.6      13 Apr 2024 04:35  Phos  3.2     04-12  Mg     2.0     04-12    TPro  6.1  /  Alb  2.5<L>  /  TBili  0.6  /  DBili  x   /  AST  43<H>  /  ALT  90<H>  /  AlkPhos  179<H>  04-13     Vital Signs Last 24 Hrs  T(C): 37.2 (13 Apr 2024 05:18), Max: 38.2 (12 Apr 2024 18:59)  T(F): 98.9 (13 Apr 2024 05:18), Max: 100.7 (12 Apr 2024 18:59)  HR: 67 (13 Apr 2024 05:18) (64 - 76)  BP: 110/62 (13 Apr 2024 06:30) (98/55 - 133/64)  BP(mean): 73 (12 Apr 2024 12:52) (73 - 73)  RR: 17 (13 Apr 2024 05:18) (17 - 18)  SpO2: 95% (13 Apr 2024 05:18) (95% - 96%)    Parameters below as of 13 Apr 2024 05:18  Patient On (Oxygen Delivery Method): room air      Sedimentation Rate, Erythrocyte: 86 mm/Hr (04-11-24 @ 14:45)         C-Reactive Protein, Serum: 279 mg/L (04-11-24 @ 14:45)   WBC Count: 15.98 K/uL *H* (04-13-24 @ 04:35)  WBC Count: 16.26 K/uL *H* (04-12-24 @ 15:53)      PHYSICAL EXAM  LE Focused:    Vasc: DP & PT palpable right foot. Left foot PT palpable, DP non palpable 2/2 edema. CFT <3 seconds to all 10 digits b/l. TG warm to warm left foot.   Derm: Left foot increased ecchymosis noted to the dorsal aspect of the foot. Erythema, and edema noted to the left foot. No signs of any open lesions noted. Increased temperature noted to the dorsum of the left foot. No fluctuance noted over the dorsal aspect. No soft tissue crepitus noted. combined total of 12 cc or purulent drainage has been obtained from dorsal foot wound.  Neuro: Gross and light touch sensation intact b/l  MSK: PTP to the left foot       IMAGING: ?xray    < from: US Duplex Venous Lower Ext Ltd, Left (04.11.24 @ 15:49) >  PROCEDURE DATE:  04/11/2024          INTERPRETATION:  CLINICAL INFORMATION: Left foot pain    COMPARISON: None available.    TECHNIQUE: Duplex sonography of the LEFT LOWER extremity veins with color   and spectral Doppler, with and without compression.    FINDINGS:    There is normal compressibility of the left common femoral, femoral and   popliteal veins.  The contralateral common femoral vein is patent.  Doppler examination shows normal spontaneous and phasic flow.    No calf vein thrombosis is detected.    IMPRESSION:  No evidence of left lower extremity deep venous thrombosis.            < end of copied text >  < from: Xray Foot AP + Lateral + Oblique, Left (04.11.24 @ 15:29) >      < end of copied text >  < from: Xray Foot AP + Lateral + Oblique, Left (04.11.24 @ 15:29) >      INTERPRETATION:  Bilateral ankles and left foot. Patient has local pain   and swelling.    COMPARISON: None available.    Left ankle. 3 views.    Arterial calcifications are noted.    There are some calcifications starting posterior to the posterior   calcaneus proceeding superiorly. There is mild degeneration of the   malleolar tips. No fracture.    Right ankle. 3 views.    There has been talar calcaneal fusion and also fusion of the lower fibula   with these 2 bones. Orthopedic hardware is seen over the lower medial   tibial area.    There is an inferior calcaneal spur.    No bone destruction or acute fracture.    Left foot. 3 views.    There is mild first MTP degeneration and mild swelling of the dorsum of   the foot. No fracture.    IMPRESSION: No acute fracture. Mild swelling dorsum left foot. Old fusion   of the right ankle joint with some orthopedic hardware.    --- End of Report ---    < end of copied text >      < from: CT Foot w/ IV Cont, Left (04.11.24 @ 19:27) >    INTERPRETATION:  Exam Type: CT FOOT WITH IV CONTRAST LEFT  Exam Date and Time: 4/11/2024 7:27 PM  Indication: Left foot pain. Evaluate forabscess.  Comparison: Same-day foot and ankle x-rays.    TECHNIQUE:  Multiplanar CT images of the left foot were obtained after administration   of 90 cc of Omnipaque 350 (10 cc discarded).    FINDINGS:  Evaluation of the left foot demonstrate juxtaarticular erosive changes at   the tibiotalar joint asymmetric to the distal lateral margin of the   tibia. Extensive mineralization is identified within the tibiotalar   joint. Similar findings of mineralization is seen about the first MTP   joint as well as at some of the TMT articulations. However, discrete   osseous erosions are not identified at these locations. There is no   osseous destruction or aggressive periosteal reaction identified. No   fracture or dislocation.    There is degenerative arthrosis involving the naviculocuneiform   articulation. No advanced productive changes are seen across the Lisfranc   interval.    Within the soft tissues, there is diffuse swelling identified along the   dorsal foot. There is fluid about the first TMT joint which decompresses   along the dorsal surface of the midfoot/forefoot. This fluid collection   along the dorsal midfoot/forefoot measures approximately 2.5 cm in the AP   dimension and approximately 2.9 cm in the transverse dimension. There is   peripheral enhancement with lack of central enhancement.    Mineralization is seen involving the distal insertional fibers of the   Achilles tendon as well as within the posterior tibialis tendon.   Otherwise, the imaged tendons are grossly intact.      IMPRESSION:  1.  Findings of gouty arthropathy about the foot as described. No CT   findings to suggest osteomyelitis.  2.  Peripheral enhancing fluid collection seen about the dorsal foot   about the first TMT joint as described is also felt to related to the   gouty arthropathy. An abscess is a less likely consideration but cannot   be entirely excluded on this examination.    < end of copied text >      CULTURES:

## 2024-04-13 NOTE — PROGRESS NOTE ADULT - ASSESSMENT
A:  Left foot infected foot    P:   Patient evaluated and Chart reviewed   Discussed diagnosis and treatment with patient  X-rays evaluated, results as noted above  CT results reviewed   Continue with IV antibiotics As Per ID  Pending A1C%  Reviewed uric acid  Additional 2-3 cc or purulent drainage was noted from left foot today  Wound was dressed with betadine 4x4, ABD, kerlix, ace bandage.    Recommend rheumatology consult   Weight bearing as tolerated left foot   Podiatry to follow while in house.  Discussed with Attending Dr. Valencia

## 2024-04-14 LAB
-  CLINDAMYCIN: SIGNIFICANT CHANGE UP
-  CLINDAMYCIN: SIGNIFICANT CHANGE UP
-  ERYTHROMYCIN: SIGNIFICANT CHANGE UP
-  ERYTHROMYCIN: SIGNIFICANT CHANGE UP
-  GENTAMICIN: SIGNIFICANT CHANGE UP
-  GENTAMICIN: SIGNIFICANT CHANGE UP
-  OXACILLIN: SIGNIFICANT CHANGE UP
-  OXACILLIN: SIGNIFICANT CHANGE UP
-  PENICILLIN: SIGNIFICANT CHANGE UP
-  RIFAMPIN: SIGNIFICANT CHANGE UP
-  RIFAMPIN: SIGNIFICANT CHANGE UP
-  TETRACYCLINE: SIGNIFICANT CHANGE UP
-  TETRACYCLINE: SIGNIFICANT CHANGE UP
-  TRIMETHOPRIM/SULFAMETHOXAZOLE: SIGNIFICANT CHANGE UP
-  TRIMETHOPRIM/SULFAMETHOXAZOLE: SIGNIFICANT CHANGE UP
-  VANCOMYCIN: SIGNIFICANT CHANGE UP
-  VANCOMYCIN: SIGNIFICANT CHANGE UP
ALBUMIN SERPL ELPH-MCNC: 2.9 G/DL — LOW (ref 3.5–5)
ALP SERPL-CCNC: 214 U/L — HIGH (ref 40–120)
ALT FLD-CCNC: 76 U/L DA — HIGH (ref 10–60)
ANION GAP SERPL CALC-SCNC: 7 MMOL/L — SIGNIFICANT CHANGE UP (ref 5–17)
AST SERPL-CCNC: 21 U/L — SIGNIFICANT CHANGE UP (ref 10–40)
BILIRUB SERPL-MCNC: 0.4 MG/DL — SIGNIFICANT CHANGE UP (ref 0.2–1.2)
BUN SERPL-MCNC: 12 MG/DL — SIGNIFICANT CHANGE UP (ref 7–18)
CALCIUM SERPL-MCNC: 9.5 MG/DL — SIGNIFICANT CHANGE UP (ref 8.4–10.5)
CHLORIDE SERPL-SCNC: 108 MMOL/L — SIGNIFICANT CHANGE UP (ref 96–108)
CO2 SERPL-SCNC: 26 MMOL/L — SIGNIFICANT CHANGE UP (ref 22–31)
CREAT SERPL-MCNC: 0.87 MG/DL — SIGNIFICANT CHANGE UP (ref 0.5–1.3)
CRP SERPL-MCNC: 106 MG/L — HIGH
CULTURE RESULTS: ABNORMAL
EGFR: 92 ML/MIN/1.73M2 — SIGNIFICANT CHANGE UP
GLUCOSE SERPL-MCNC: 129 MG/DL — HIGH (ref 70–99)
HCT VFR BLD CALC: 39 % — SIGNIFICANT CHANGE UP (ref 39–50)
HGB BLD-MCNC: 12.9 G/DL — LOW (ref 13–17)
MAGNESIUM SERPL-MCNC: 2.4 MG/DL — SIGNIFICANT CHANGE UP (ref 1.6–2.6)
MCHC RBC-ENTMCNC: 30.9 PG — SIGNIFICANT CHANGE UP (ref 27–34)
MCHC RBC-ENTMCNC: 33.1 GM/DL — SIGNIFICANT CHANGE UP (ref 32–36)
MCV RBC AUTO: 93.5 FL — SIGNIFICANT CHANGE UP (ref 80–100)
METHOD TYPE: SIGNIFICANT CHANGE UP
METHOD TYPE: SIGNIFICANT CHANGE UP
NRBC # BLD: 0 /100 WBCS — SIGNIFICANT CHANGE UP (ref 0–0)
PHOSPHATE SERPL-MCNC: 3.9 MG/DL — SIGNIFICANT CHANGE UP (ref 2.5–4.5)
PLATELET # BLD AUTO: 320 K/UL — SIGNIFICANT CHANGE UP (ref 150–400)
POTASSIUM SERPL-MCNC: 4.5 MMOL/L — SIGNIFICANT CHANGE UP (ref 3.5–5.3)
POTASSIUM SERPL-SCNC: 4.5 MMOL/L — SIGNIFICANT CHANGE UP (ref 3.5–5.3)
PROT SERPL-MCNC: 7.1 G/DL — SIGNIFICANT CHANGE UP (ref 6–8.3)
RBC # BLD: 4.17 M/UL — LOW (ref 4.2–5.8)
RBC # FLD: 13.2 % — SIGNIFICANT CHANGE UP (ref 10.3–14.5)
SODIUM SERPL-SCNC: 141 MMOL/L — SIGNIFICANT CHANGE UP (ref 135–145)
SPECIMEN SOURCE: SIGNIFICANT CHANGE UP
WBC # BLD: 15.84 K/UL — HIGH (ref 3.8–10.5)
WBC # FLD AUTO: 15.84 K/UL — HIGH (ref 3.8–10.5)

## 2024-04-14 PROCEDURE — 99233 SBSQ HOSP IP/OBS HIGH 50: CPT

## 2024-04-14 RX ORDER — AMPICILLIN SODIUM AND SULBACTAM SODIUM 250; 125 MG/ML; MG/ML
INJECTION, POWDER, FOR SUSPENSION INTRAMUSCULAR; INTRAVENOUS
Refills: 0 | Status: DISCONTINUED | OUTPATIENT
Start: 2024-04-14 | End: 2024-04-23

## 2024-04-14 RX ORDER — AMPICILLIN SODIUM AND SULBACTAM SODIUM 250; 125 MG/ML; MG/ML
3 INJECTION, POWDER, FOR SUSPENSION INTRAMUSCULAR; INTRAVENOUS EVERY 6 HOURS
Refills: 0 | Status: DISCONTINUED | OUTPATIENT
Start: 2024-04-15 | End: 2024-04-23

## 2024-04-14 RX ORDER — AMPICILLIN SODIUM AND SULBACTAM SODIUM 250; 125 MG/ML; MG/ML
3 INJECTION, POWDER, FOR SUSPENSION INTRAMUSCULAR; INTRAVENOUS ONCE
Refills: 0 | Status: COMPLETED | OUTPATIENT
Start: 2024-04-14 | End: 2024-04-14

## 2024-04-14 RX ADMIN — Medication 166.67 MILLIGRAM(S): at 05:57

## 2024-04-14 RX ADMIN — AMPICILLIN SODIUM AND SULBACTAM SODIUM 200 GRAM(S): 250; 125 INJECTION, POWDER, FOR SUSPENSION INTRAMUSCULAR; INTRAVENOUS at 17:12

## 2024-04-14 RX ADMIN — ENOXAPARIN SODIUM 40 MILLIGRAM(S): 100 INJECTION SUBCUTANEOUS at 05:57

## 2024-04-14 NOTE — PROGRESS NOTE ADULT - SUBJECTIVE AND OBJECTIVE BOX
73y Male    Meds:  ampicillin/sulbactam  IVPB        Allergies    No Known Allergies    Intolerances        VITALS:  Vital Signs Last 24 Hrs  T(C): 36.7 (14 Apr 2024 13:58), Max: 37.6 (13 Apr 2024 21:25)  T(F): 98.1 (14 Apr 2024 13:58), Max: 99.6 (13 Apr 2024 21:25)  HR: 72 (14 Apr 2024 13:58) (72 - 74)  BP: 118/56 (14 Apr 2024 13:58) (118/56 - 143/64)  BP(mean): 92 (13 Apr 2024 20:49) (92 - 92)  RR: 18 (14 Apr 2024 13:58) (18 - 18)  SpO2: 96% (14 Apr 2024 13:58) (94% - 96%)    Parameters below as of 14 Apr 2024 13:58  Patient On (Oxygen Delivery Method): room air        LABS/DIAGNOSTIC TESTS:                          12.9   15.84 )-----------( 320      ( 14 Apr 2024 05:41 )             39.0         04-14    141  |  108  |  12  ----------------------------<  129<H>  4.5   |  26  |  0.87    Ca    9.5      14 Apr 2024 05:41  Phos  3.9     04-14  Mg     2.4     04-14    TPro  7.1  /  Alb  2.9<L>  /  TBili  0.4  /  DBili  x   /  AST  21  /  ALT  76<H>  /  AlkPhos  214<H>  04-14      LIVER FUNCTIONS - ( 14 Apr 2024 05:41 )  Alb: 2.9 g/dL / Pro: 7.1 g/dL / ALK PHOS: 214 U/L / ALT: 76 U/L DA / AST: 21 U/L / GGT: x             CULTURES: .Blood Blood  04-13 @ 06:00   No growth at 24 hours  --  --      .Blood Blood  04-13 @ 05:55   No growth at 24 hours  --  --      .Surgical Swab left foot wound  04-12 @ 16:20   Moderate Streptococcus dysgalactiae (Group C/G) "Susceptibilities not  performed"  --  --      .Blood Blood-Peripheral  04-11 @ 14:45   Growth in aerobic and anaerobic bottles: Staphylococcus epidermidis  "Susceptibilities not performed"  Growth in aerobic bottle: Staphylococcus hominis "Susceptibilities not  performed"  Growth in aerobic bottle: Staphylococcus petrasii "Susceptibilities not  performed"  --    Growth in aerobic bottle: Gram Positive Cocci in Clusters  Growth in anaerobic bottle: Gram Positive Cocci in Clusters      .Blood Blood  04-11 @ 14:40   Growth in aerobic and anaerobic bottles: Staphylococcus epidermidis  Growth in aerobic bottle: Staphylococcus hominis  Direct identification is available within approximately 3-5  hours either by Blood Panel Multiplexed PCR or Direct  MALDI-TOF. Details: [email or web address]  --  Blood Culture PCR  Staphylococcus epidermidis  Staphylococcus hominis            RADIOLOGY:      ROS:  [  ] UNABLE TO ELICIT 73y Male who is doing better, he has less left foot swelling , pain and redness but still significant. He has no fevers or chills , no diarrhea or other new complaints. He is growing Strep C/ G from his abscess cultures hence I switched his Vancomycin to Unasyn 3gms iv q6hrs. His WBC count is still a little high.    Meds:  ampicillin/sulbactam  IVPB        Allergies    No Known Allergies    Intolerances        VITALS:  Vital Signs Last 24 Hrs  T(C): 36.7 (14 Apr 2024 13:58), Max: 37.6 (13 Apr 2024 21:25)  T(F): 98.1 (14 Apr 2024 13:58), Max: 99.6 (13 Apr 2024 21:25)  HR: 72 (14 Apr 2024 13:58) (72 - 74)  BP: 118/56 (14 Apr 2024 13:58) (118/56 - 143/64)  BP(mean): 92 (13 Apr 2024 20:49) (92 - 92)  RR: 18 (14 Apr 2024 13:58) (18 - 18)  SpO2: 96% (14 Apr 2024 13:58) (94% - 96%)    Parameters below as of 14 Apr 2024 13:58  Patient On (Oxygen Delivery Method): room air        LABS/DIAGNOSTIC TESTS:                          12.9   15.84 )-----------( 320      ( 14 Apr 2024 05:41 )             39.0         04-14    141  |  108  |  12  ----------------------------<  129<H>  4.5   |  26  |  0.87    Ca    9.5      14 Apr 2024 05:41  Phos  3.9     04-14  Mg     2.4     04-14    TPro  7.1  /  Alb  2.9<L>  /  TBili  0.4  /  DBili  x   /  AST  21  /  ALT  76<H>  /  AlkPhos  214<H>  04-14      LIVER FUNCTIONS - ( 14 Apr 2024 05:41 )  Alb: 2.9 g/dL / Pro: 7.1 g/dL / ALK PHOS: 214 U/L / ALT: 76 U/L DA / AST: 21 U/L / GGT: x             CULTURES: .Blood Blood  04-13 @ 06:00   No growth at 24 hours  --  --      .Blood Blood  04-13 @ 05:55   No growth at 24 hours  --  --      .Surgical Swab left foot wound  04-12 @ 16:20   Moderate Streptococcus dysgalactiae (Group C/G) "Susceptibilities not  performed"  --  --      .Blood Blood-Peripheral  04-11 @ 14:45   Growth in aerobic and anaerobic bottles: Staphylococcus epidermidis  "Susceptibilities not performed"  Growth in aerobic bottle: Staphylococcus hominis "Susceptibilities not  performed"  Growth in aerobic bottle: Staphylococcus petrasii "Susceptibilities not  performed"  --    Growth in aerobic bottle: Gram Positive Cocci in Clusters  Growth in anaerobic bottle: Gram Positive Cocci in Clusters      .Blood Blood  04-11 @ 14:40   Growth in aerobic and anaerobic bottles: Staphylococcus epidermidis  Growth in aerobic bottle: Staphylococcus hominis  Direct identification is available within approximately 3-5  hours either by Blood Panel Multiplexed PCR or Direct  MALDI-TOF. Details: [email or web address]  --  Blood Culture PCR  Staphylococcus epidermidis  Staphylococcus hominis            RADIOLOGY:      ROS:  [  ] UNABLE TO ELICIT

## 2024-04-14 NOTE — PROGRESS NOTE ADULT - SUBJECTIVE AND OBJECTIVE BOX
Interval: Patient was seen resting bedisde. Patient stated he still is pain and the foot is still red.     Patient is a 73y old  Male who presents with a chief complaint of left foot pain    HPI:73-year-old male with past medical history hyperlipidemia, prediabetes, SLE on hydroxychloroquine presents with left foot pain over the past 4 days.  Patient states he stepped on a stone 4 days ago, states shortly afterwards he start experiencing left foot redness, swelling, pain, fevers, and chills.  Patient report taking ibuprofen with little relief.  Patient seen by primary care doctor today, advised to the emergency department further evaluation.  Denies any nasal congestion, cough, chest pain, shortness of breath, abdominal pain, vomiting, numbness, or weakness.  Denies any additional complaints.      Podiatry HPI: 73-year-old male with past medical history hyperlipidemia, prediabetes, SLE on hydroxychloroquine presents with left foot pain over the past 4 days. Patient states that one monday he was walking and stepped on a stone. Patient states later that day he began to develop pain and fevers and chills. Patient states that this entire week his foot has become increasingly swollen and he has been unable to walk. Patient states he has had fevers and chills all week long. Patient went to his PCP who gave him pain meds and recommened he come to ED for further workup.     Medications acetaminophen     Tablet .. 650 milliGRAM(s) Oral every 6 hours PRN  enoxaparin Injectable 40 milliGRAM(s) SubCutaneous every 24 hours  lactated ringers. 1000 milliLiter(s) IV Continuous <Continuous>  ondansetron Injectable 4 milliGRAM(s) IV Push every 8 hours PRN  oxycodone    5 mG/acetaminophen 325 mG 1 Tablet(s) Oral every 4 hours PRN  vancomycin  IVPB      vancomycin  IVPB 1250 milliGRAM(s) IV Intermittent every 12 hours    FH,   PMHOsteoarthritis    Osteoarthritis of left knee    High cholesterol       PSHNo significant past surgical history    H/O foot surgery        Labs                          12.9   15.84 )-----------( 320      ( 14 Apr 2024 05:41 )             39.0      04-14    141  |  108  |  12  ----------------------------<  129<H>  4.5   |  26  |  0.87    Ca    9.5      14 Apr 2024 05:41  Phos  3.9     04-14  Mg     2.4     04-14    TPro  7.1  /  Alb  2.9<L>  /  TBili  0.4  /  DBili  x   /  AST  21  /  ALT  76<H>  /  AlkPhos  214<H>  04-14     Vital Signs Last 24 Hrs  T(C): 37.3 (14 Apr 2024 05:12), Max: 37.6 (13 Apr 2024 21:25)  T(F): 99.2 (14 Apr 2024 05:12), Max: 99.6 (13 Apr 2024 21:25)  HR: 73 (14 Apr 2024 05:12) (68 - 74)  BP: 119/69 (14 Apr 2024 05:12) (105/60 - 143/64)  BP(mean): 92 (13 Apr 2024 20:49) (92 - 92)  RR: 18 (14 Apr 2024 05:12) (17 - 18)  SpO2: 96% (14 Apr 2024 05:12) (94% - 96%)    Parameters below as of 14 Apr 2024 05:12  Patient On (Oxygen Delivery Method): room air      Sedimentation Rate, Erythrocyte: 86 mm/Hr (04-11-24 @ 14:45)         C-Reactive Protein, Serum: 279 mg/L (04-11-24 @ 14:45)   WBC Count: 15.84 K/uL *H* (04-14-24 @ 05:41)      PHYSICAL EXAM  LE Focused:    Vasc: DP & PT palpable right foot. Left foot PT palpable, DP non palpable 2/2 edema. CFT <3 seconds to all 10 digits b/l. TG warm to warm left foot.   Derm: Left foot increased ecchymosis noted to the dorsal aspect of the foot. Erythema, and edema noted to the left foot. No signs of any open lesions noted. Increased temperature noted to the dorsum of the left foot. No fluctuance noted over the dorsal aspect. No soft tissue crepitus noted. combined total of 12 cc or purulent drainage has been obtained from dorsal foot wound. Further purlent drainage was expressed from the wound 4/14 approx 2 cc.   Neuro: Gross and light touch sensation intact b/l  MSK: PTP to the left foot       IMAGING: ?xray    < from: US Duplex Venous Lower Ext Ltd, Left (04.11.24 @ 15:49) >  PROCEDURE DATE:  04/11/2024          INTERPRETATION:  CLINICAL INFORMATION: Left foot pain    COMPARISON: None available.    TECHNIQUE: Duplex sonography of the LEFT LOWER extremity veins with color   and spectral Doppler, with and without compression.    FINDINGS:    There is normal compressibility of the left common femoral, femoral and   popliteal veins.  The contralateral common femoral vein is patent.  Doppler examination shows normal spontaneous and phasic flow.    No calf vein thrombosis is detected.    IMPRESSION:  No evidence of left lower extremity deep venous thrombosis.            < end of copied text >  < from: Xray Foot AP + Lateral + Oblique, Left (04.11.24 @ 15:29) >      < end of copied text >  < from: Xray Foot AP + Lateral + Oblique, Left (04.11.24 @ 15:29) >      INTERPRETATION:  Bilateral ankles and left foot. Patient has local pain   and swelling.    COMPARISON: None available.    Left ankle. 3 views.    Arterial calcifications are noted.    There are some calcifications starting posterior to the posterior   calcaneus proceeding superiorly. There is mild degeneration of the   malleolar tips. No fracture.    Right ankle. 3 views.    There has been talar calcaneal fusion and also fusion of the lower fibula   with these 2 bones. Orthopedic hardware is seen over the lower medial   tibial area.    There is an inferior calcaneal spur.    No bone destruction or acute fracture.    Left foot. 3 views.    There is mild first MTP degeneration and mild swelling of the dorsum of   the foot. No fracture.    IMPRESSION: No acute fracture. Mild swelling dorsum left foot. Old fusion   of the right ankle joint with some orthopedic hardware.    --- End of Report ---    < end of copied text >      < from: CT Foot w/ IV Cont, Left (04.11.24 @ 19:27) >    INTERPRETATION:  Exam Type: CT FOOT WITH IV CONTRAST LEFT  Exam Date and Time: 4/11/2024 7:27 PM  Indication: Left foot pain. Evaluate forabscess.  Comparison: Same-day foot and ankle x-rays.    TECHNIQUE:  Multiplanar CT images of the left foot were obtained after administration   of 90 cc of Omnipaque 350 (10 cc discarded).    FINDINGS:  Evaluation of the left foot demonstrate juxtaarticular erosive changes at   the tibiotalar joint asymmetric to the distal lateral margin of the   tibia. Extensive mineralization is identified within the tibiotalar   joint. Similar findings of mineralization is seen about the first MTP   joint as well as at some of the TMT articulations. However, discrete   osseous erosions are not identified at these locations. There is no   osseous destruction or aggressive periosteal reaction identified. No   fracture or dislocation.    There is degenerative arthrosis involving the naviculocuneiform   articulation. No advanced productive changes are seen across the Lisfranc   interval.    Within the soft tissues, there is diffuse swelling identified along the   dorsal foot. There is fluid about the first TMT joint which decompresses   along the dorsal surface of the midfoot/forefoot. This fluid collection   along the dorsal midfoot/forefoot measures approximately 2.5 cm in the AP   dimension and approximately 2.9 cm in the transverse dimension. There is   peripheral enhancement with lack of central enhancement.    Mineralization is seen involving the distal insertional fibers of the   Achilles tendon as well as within the posterior tibialis tendon.   Otherwise, the imaged tendons are grossly intact.      IMPRESSION:  1.  Findings of gouty arthropathy about the foot as described. No CT   findings to suggest osteomyelitis.  2.  Peripheral enhancing fluid collection seen about the dorsal foot   about the first TMT joint as described is also felt to related to the   gouty arthropathy. An abscess is a less likely consideration but cannot   be entirely excluded on this examination.    < end of copied text >      CULTURES:

## 2024-04-14 NOTE — PROGRESS NOTE ADULT - SUBJECTIVE AND OBJECTIVE BOX
Patient is a 73y old  Male who presents with a chief complaint of right foot cellulitis (12 Apr 2024 11:58)      SUBJECTIVE / OVERNIGHT EVENTS:    MEDICATIONS  (STANDING):  enoxaparin Injectable 40 milliGRAM(s) SubCutaneous every 24 hours  lactated ringers. 1000 milliLiter(s) (75 mL/Hr) IV Continuous <Continuous>  vancomycin  IVPB      vancomycin  IVPB 1250 milliGRAM(s) IV Intermittent every 12 hours    MEDICATIONS  (PRN):  acetaminophen     Tablet .. 650 milliGRAM(s) Oral every 6 hours PRN Temp greater or equal to 38C (100.4F), Mild Pain (1 - 3)  ondansetron Injectable 4 milliGRAM(s) IV Push every 8 hours PRN Nausea and/or Vomiting  oxycodone    5 mG/acetaminophen 325 mG 1 Tablet(s) Oral every 4 hours PRN Severe Pain (7 - 10)      Vital Signs Last 24 Hrs  T(C): 37.3 (04-14-24 @ 05:12)  T(F): 99.2 (04-14-24 @ 05:12), Max: 99.6 (04-13-24 @ 21:25)  HR: 73 (04-14-24 @ 05:12) (68 - 74)  BP: 119/69 (04-14-24 @ 05:12)  BP(mean): 92 (04-13-24 @ 20:49) (92 - 92)  RR: 18 (04-14-24 @ 05:12) (17 - 18)  SpO2: 96% (04-14-24 @ 05:12) (94% - 96%)  Wt(kg): --    CAPILLARY BLOOD GLUCOSE        I&O's Summary      PHYSICAL EXAM:  GENERAL: NAD, well-developed  HEAD:  Atraumatic, Normocephalic  EYES: EOMI, PERRLA, conjunctiva and sclera clear  NECK: Supple, No JVD  CHEST/LUNG: Clear to auscultation bilaterally; No wheeze  HEART: Regular rate and rhythm; No murmurs, rubs, or gallops  ABDOMEN: Soft, Nontender, Nondistended; Bowel sounds present  EXTREMITIES:  2+ Peripheral Pulses, No clubbing, cyanosis, or edema  PSYCH: AAOx3  NEUROLOGY: non-focal  SKIN: No rashes or lesions    LABS:                        12.9   15.84 )-----------( 320      ( 14 Apr 2024 05:41 )             39.0     04-14    141  |  108  |  12  ----------------------------<  129<H>  4.5   |  26  |  0.87    Ca    9.5      14 Apr 2024 05:41  Phos  3.9     04-14  Mg     2.4     04-14    TPro  7.1  /  Alb  2.9<L>  /  TBili  0.4  /  DBili  x   /  AST  21  /  ALT  76<H>  /  AlkPhos  214<H>  04-14    PT/INR - ( 12 Apr 2024 15:53 )   PT: 13.4 sec;   INR: 1.18 ratio         PTT - ( 12 Apr 2024 15:53 )  PTT:33.9 sec      Urinalysis Basic - ( 14 Apr 2024 05:41 )    Color: x / Appearance: x / SG: x / pH: x  Gluc: 129 mg/dL / Ketone: x  / Bili: x / Urobili: x   Blood: x / Protein: x / Nitrite: x   Leuk Esterase: x / RBC: x / WBC x   Sq Epi: x / Non Sq Epi: x / Bacteria: x        RADIOLOGY & ADDITIONAL TESTS:    Imaging Personally Reviewed:    Consultant(s) Notes Reviewed:      Care Discussed with Consultants/Other Providers:    Assessment and Plan:       Patient is a 73y old  Male who presents with a chief complaint of right foot cellulitis (12 Apr 2024 11:58)      SUBJECTIVE / OVERNIGHT EVENTS: no acute events o.n. feels better. wife Saira was at bedside- helped translate. pain improved on left foot. 3 cc pus was more drained by podiatry.     MEDICATIONS  (STANDING):  enoxaparin Injectable 40 milliGRAM(s) SubCutaneous every 24 hours  lactated ringers. 1000 milliLiter(s) (75 mL/Hr) IV Continuous <Continuous>  vancomycin  IVPB      vancomycin  IVPB 1250 milliGRAM(s) IV Intermittent every 12 hours    MEDICATIONS  (PRN):  acetaminophen     Tablet .. 650 milliGRAM(s) Oral every 6 hours PRN Temp greater or equal to 38C (100.4F), Mild Pain (1 - 3)  ondansetron Injectable 4 milliGRAM(s) IV Push every 8 hours PRN Nausea and/or Vomiting  oxycodone    5 mG/acetaminophen 325 mG 1 Tablet(s) Oral every 4 hours PRN Severe Pain (7 - 10)      Vital Signs Last 24 Hrs  T(C): 37.3 (04-14-24 @ 05:12)  T(F): 99.2 (04-14-24 @ 05:12), Max: 99.6 (04-13-24 @ 21:25)  HR: 73 (04-14-24 @ 05:12) (68 - 74)  BP: 119/69 (04-14-24 @ 05:12)  BP(mean): 92 (04-13-24 @ 20:49) (92 - 92)  RR: 18 (04-14-24 @ 05:12) (17 - 18)  SpO2: 96% (04-14-24 @ 05:12) (94% - 96%)  Wt(kg): --    CAPILLARY BLOOD GLUCOSE        I&O's Summary      PHYSICAL EXAM:  nad comfortable  cta bl  s1s2+rrr  soft bs+  left foot bandaged  ao3    LABS:                        12.9   15.84 )-----------( 320      ( 14 Apr 2024 05:41 )             39.0     04-14    141  |  108  |  12  ----------------------------<  129<H>  4.5   |  26  |  0.87    Ca    9.5      14 Apr 2024 05:41  Phos  3.9     04-14  Mg     2.4     04-14    TPro  7.1  /  Alb  2.9<L>  /  TBili  0.4  /  DBili  x   /  AST  21  /  ALT  76<H>  /  AlkPhos  214<H>  04-14    PT/INR - ( 12 Apr 2024 15:53 )   PT: 13.4 sec;   INR: 1.18 ratio         PTT - ( 12 Apr 2024 15:53 )  PTT:33.9 sec      Urinalysis Basic - ( 14 Apr 2024 05:41 )    Color: x / Appearance: x / SG: x / pH: x  Gluc: 129 mg/dL / Ketone: x  / Bili: x / Urobili: x   Blood: x / Protein: x / Nitrite: x   Leuk Esterase: x / RBC: x / WBC x   Sq Epi: x / Non Sq Epi: x / Bacteria: x        RADIOLOGY & ADDITIONAL TESTS:    Imaging Personally Reviewed:    Consultant(s) Notes Reviewed:      Care Discussed with Consultants/Other Providers:    Assessment and Plan: 73 y.o. M from home with PMHx of HLD, prediabetes, SLE on hydroxychloroquine presents with left foot pain over the past 4 days. Admitted to medicine for L foot cellultis with abscess and gram + cocci in clusters bacteremia    #Sepsis secondary to gram positive bacteremia  #Gouty arthritis  #SLE  #preDM  #HLD    - rpt blood cx- negative, TTE- No vegetation, EF normal, blood cx- speciation ?? contamination- atbx changes per ID recs- Case dw/ Dr. Downey  Blood cultures with MRS Epidermidis and staph hominis, could be contamination  -, followup surgical swab- podiatry drain further pus today 4/13/24  -no MRI for now- will follow up with podiatry for further recs  Prediabetes needs more effective lifestyle modifications, AM sugars appears well controlled, continue carb consistent diet  Unclear if abscess is related to joint per CT findings  Hydroxychloroquine was discontinued  Uric Acid level controlled  -wbc trending down, rpt cbc, bmp mg phos in the am

## 2024-04-14 NOTE — PROGRESS NOTE ADULT - ASSESSMENT
Left Foot Cellulitis / Abscess - s/ p I&D  Fevers - resolved  Leukocytosis - stable  Bacteremia -  contaminant    Plan - DC Vancomycin   Started Unasyn 3gms iv q6hrs  if doing well will plan to DC home on augmentin 875mgs po bid x 7 days more on Tuesday.

## 2024-04-14 NOTE — PROGRESS NOTE ADULT - ASSESSMENT
A:  Left foot infected foot    P:   Patient evaluated and Chart reviewed   Discussed diagnosis and treatment with patient  X-rays evaluated, results as noted above  CT results reviewed   Continue with IV antibiotics As Per ID  Reviewed A1C%  Reviewed uric acid  Additional 2-3 cc or purulent drainage was noted from left foot today. Pain plantar to the incision site. Fluctuance is resolved.   Wound was dressed with betadine 4x4, ABD, kerlix, ace bandage.    Weight bearing as tolerated left foot   Podiatry to follow while in house.  Seen, Discussed and reviewed with Attending Dr. Valencia

## 2024-04-15 LAB
ALBUMIN SERPL ELPH-MCNC: 2.9 G/DL — LOW (ref 3.5–5)
ALP SERPL-CCNC: 242 U/L — HIGH (ref 40–120)
ALT FLD-CCNC: 145 U/L DA — HIGH (ref 10–60)
ANION GAP SERPL CALC-SCNC: 8 MMOL/L — SIGNIFICANT CHANGE UP (ref 5–17)
AST SERPL-CCNC: 128 U/L — HIGH (ref 10–40)
BILIRUB SERPL-MCNC: 0.6 MG/DL — SIGNIFICANT CHANGE UP (ref 0.2–1.2)
BUN SERPL-MCNC: 16 MG/DL — SIGNIFICANT CHANGE UP (ref 7–18)
CALCIUM SERPL-MCNC: 10.2 MG/DL — SIGNIFICANT CHANGE UP (ref 8.4–10.5)
CHLORIDE SERPL-SCNC: 107 MMOL/L — SIGNIFICANT CHANGE UP (ref 96–108)
CO2 SERPL-SCNC: 26 MMOL/L — SIGNIFICANT CHANGE UP (ref 22–31)
CREAT SERPL-MCNC: 0.84 MG/DL — SIGNIFICANT CHANGE UP (ref 0.5–1.3)
CULTURE RESULTS: ABNORMAL
EGFR: 93 ML/MIN/1.73M2 — SIGNIFICANT CHANGE UP
GLUCOSE SERPL-MCNC: 121 MG/DL — HIGH (ref 70–99)
HCT VFR BLD CALC: 41.5 % — SIGNIFICANT CHANGE UP (ref 39–50)
HGB BLD-MCNC: 13.3 G/DL — SIGNIFICANT CHANGE UP (ref 13–17)
MAGNESIUM SERPL-MCNC: 2.8 MG/DL — HIGH (ref 1.6–2.6)
MCHC RBC-ENTMCNC: 30.4 PG — SIGNIFICANT CHANGE UP (ref 27–34)
MCHC RBC-ENTMCNC: 32 GM/DL — SIGNIFICANT CHANGE UP (ref 32–36)
MCV RBC AUTO: 94.7 FL — SIGNIFICANT CHANGE UP (ref 80–100)
NRBC # BLD: 0 /100 WBCS — SIGNIFICANT CHANGE UP (ref 0–0)
ORGANISM # SPEC MICROSCOPIC CNT: ABNORMAL
PHOSPHATE SERPL-MCNC: 4.2 MG/DL — SIGNIFICANT CHANGE UP (ref 2.5–4.5)
PLATELET # BLD AUTO: 389 K/UL — SIGNIFICANT CHANGE UP (ref 150–400)
POTASSIUM SERPL-MCNC: 4.3 MMOL/L — SIGNIFICANT CHANGE UP (ref 3.5–5.3)
POTASSIUM SERPL-SCNC: 4.3 MMOL/L — SIGNIFICANT CHANGE UP (ref 3.5–5.3)
PROT SERPL-MCNC: 7.5 G/DL — SIGNIFICANT CHANGE UP (ref 6–8.3)
RBC # BLD: 4.38 M/UL — SIGNIFICANT CHANGE UP (ref 4.2–5.8)
RBC # FLD: 13.3 % — SIGNIFICANT CHANGE UP (ref 10.3–14.5)
SODIUM SERPL-SCNC: 141 MMOL/L — SIGNIFICANT CHANGE UP (ref 135–145)
SPECIMEN SOURCE: SIGNIFICANT CHANGE UP
WBC # BLD: 13.11 K/UL — HIGH (ref 3.8–10.5)
WBC # FLD AUTO: 13.11 K/UL — HIGH (ref 3.8–10.5)

## 2024-04-15 PROCEDURE — 99233 SBSQ HOSP IP/OBS HIGH 50: CPT

## 2024-04-15 RX ADMIN — AMPICILLIN SODIUM AND SULBACTAM SODIUM 200 GRAM(S): 250; 125 INJECTION, POWDER, FOR SUSPENSION INTRAMUSCULAR; INTRAVENOUS at 17:38

## 2024-04-15 RX ADMIN — AMPICILLIN SODIUM AND SULBACTAM SODIUM 200 GRAM(S): 250; 125 INJECTION, POWDER, FOR SUSPENSION INTRAMUSCULAR; INTRAVENOUS at 06:12

## 2024-04-15 RX ADMIN — AMPICILLIN SODIUM AND SULBACTAM SODIUM 200 GRAM(S): 250; 125 INJECTION, POWDER, FOR SUSPENSION INTRAMUSCULAR; INTRAVENOUS at 00:12

## 2024-04-15 RX ADMIN — Medication 650 MILLIGRAM(S): at 10:19

## 2024-04-15 RX ADMIN — AMPICILLIN SODIUM AND SULBACTAM SODIUM 200 GRAM(S): 250; 125 INJECTION, POWDER, FOR SUSPENSION INTRAMUSCULAR; INTRAVENOUS at 23:22

## 2024-04-15 RX ADMIN — Medication 650 MILLIGRAM(S): at 11:00

## 2024-04-15 RX ADMIN — AMPICILLIN SODIUM AND SULBACTAM SODIUM 200 GRAM(S): 250; 125 INJECTION, POWDER, FOR SUSPENSION INTRAMUSCULAR; INTRAVENOUS at 14:21

## 2024-04-15 RX ADMIN — ENOXAPARIN SODIUM 40 MILLIGRAM(S): 100 INJECTION SUBCUTANEOUS at 06:12

## 2024-04-15 NOTE — PROGRESS NOTE ADULT - ASSESSMENT
A:  Left foot infected foot    P:   Patient evaluated and Chart reviewed   Discussed diagnosis and treatment with patient  X-rays evaluated, results as noted above  CT results reviewed   Continue with IV antibiotics As Per ID  No purulent drainage was expressed from the wound today. Pain plantar to the incision site. Fluctuance is resolved.   Wound was dressed with betadine 4x4, ABD, kerlix, ace bandage.    Weight bearing as tolerated left foot   Podiatry to follow while in house.  Discussed with Attending Dr. Valencia  A:  Left foot infected foot    P:   Patient evaluated and Chart reviewed   Discussed diagnosis and treatment with patient  X-rays evaluated, results as noted above  CT results reviewed   Continue with IV antibiotics As Per ID  No purulent drainage was expressed from the wound today. Pain plantar to the incision site. Fluctuance is resolved.   Wound was dressed with betadine 4x4, ABD, kerlix, ace bandage.    Weight bearing as tolerated left foot   Podiatry to follow while in house.  Discussed with Attending Dr. Valencia     WCO: Betadine, 4x4 gauze, abd, ,jorge ace bandage to be changed every other day

## 2024-04-15 NOTE — PROGRESS NOTE ADULT - ASSESSMENT
Patient is a 73 year old Male from home with PMHx of HLD, prediabetes, SLE on hydroxychloroquine presents with left foot pain over the past 4 days. Admitted to medicine for L foot cellulitis found. Blood culture positive likely contaminate.    ID Dr. Downey and podiatry consulted. Doppler negative for DVT. CT left foot no OM, possible abscess. Podiatry consulted.

## 2024-04-15 NOTE — PROGRESS NOTE ADULT - SUBJECTIVE AND OBJECTIVE BOX
Interval: Patient was seen resting bedisde. Patient tried to walk this morning and states he has some mild discomfort when ambulating.     Patient is a 73y old  Male who presents with a chief complaint of left foot pain    HPI:73-year-old male with past medical history hyperlipidemia, prediabetes, SLE on hydroxychloroquine presents with left foot pain over the past 4 days.  Patient states he stepped on a stone 4 days ago, states shortly afterwards he start experiencing left foot redness, swelling, pain, fevers, and chills.  Patient report taking ibuprofen with little relief.  Patient seen by primary care doctor today, advised to the emergency department further evaluation.  Denies any nasal congestion, cough, chest pain, shortness of breath, abdominal pain, vomiting, numbness, or weakness.  Denies any additional complaints.      Podiatry HPI: 73-year-old male with past medical history hyperlipidemia, prediabetes, SLE on hydroxychloroquine presents with left foot pain over the past 4 days. Patient states that one monday he was walking and stepped on a stone. Patient states later that day he began to develop pain and fevers and chills. Patient states that this entire week his foot has become increasingly swollen and he has been unable to walk. Patient states he has had fevers and chills all week long. Patient went to his PCP who gave him pain meds and recommened he come to ED for further workup.     Medications acetaminophen     Tablet .. 650 milliGRAM(s) Oral every 6 hours PRN  ampicillin/sulbactam  IVPB      ampicillin/sulbactam  IVPB 3 Gram(s) IV Intermittent every 6 hours  enoxaparin Injectable 40 milliGRAM(s) SubCutaneous every 24 hours  lactated ringers. 1000 milliLiter(s) IV Continuous <Continuous>  ondansetron Injectable 4 milliGRAM(s) IV Push every 8 hours PRN    FH,   PMHOsteoarthritis    Osteoarthritis of left knee    High cholesterol       PSHNo significant past surgical history    H/O foot surgery        Labs                          13.3   13.11 )-----------( 389      ( 15 Apr 2024 06:10 )             41.5      04-15    141  |  107  |  16  ----------------------------<  121<H>  4.3   |  26  |  0.84    Ca    10.2      15 Apr 2024 06:10  Phos  4.2     04-15  Mg     2.8     04-15    TPro  7.5  /  Alb  2.9<L>  /  TBili  0.6  /  DBili  x   /  AST  128<H>  /  ALT  145<H>  /  AlkPhos  242<H>  04-15     Vital Signs Last 24 Hrs  T(C): 36.8 (15 Apr 2024 05:33), Max: 37.2 (14 Apr 2024 20:51)  T(F): 98.2 (15 Apr 2024 05:33), Max: 98.9 (14 Apr 2024 20:51)  HR: 73 (15 Apr 2024 05:33) (72 - 79)  BP: 129/75 (15 Apr 2024 05:33) (118/56 - 130/75)  BP(mean): 93 (14 Apr 2024 20:51) (93 - 93)  RR: 18 (15 Apr 2024 05:33) (18 - 18)  SpO2: 96% (15 Apr 2024 05:33) (96% - 96%)    Parameters below as of 15 Apr 2024 05:33  Patient On (Oxygen Delivery Method): room air      Sedimentation Rate, Erythrocyte: 86 mm/Hr (04-11-24 @ 14:45)         C-Reactive Protein, Serum: 106 mg/L (04-14-24 @ 05:41)  C-Reactive Protein, Serum: 279 mg/L (04-11-24 @ 14:45)   WBC Count: 13.11 K/uL *H* (04-15-24 @ 06:10)      PHYSICAL EXAM  LE Focused:    Vasc: DP & PT palpable right foot. Left foot PT palpable, DP non palpable 2/2 edema. CFT <3 seconds to all 10 digits b/l. TG warm to warm left foot.   Derm: Left foot increased ecchymosis noted to the dorsal aspect of the foot. Erythema, and edema noted to the left foot. No signs of any open lesions noted. Increased temperature noted to the dorsum of the left foot. No fluctuance noted over the dorsal aspect. No soft tissue crepitus noted. combined total of 12 cc or purulent drainage has been obtained from dorsal foot wound. Further purlent drainage was expressed from the wound 4/14 approx 2 cc.   Neuro: Gross and light touch sensation intact b/l  MSK: PTP to the left foot       IMAGING: ?xray    < from: US Duplex Venous Lower Ext Ltd, Left (04.11.24 @ 15:49) >  PROCEDURE DATE:  04/11/2024          INTERPRETATION:  CLINICAL INFORMATION: Left foot pain    COMPARISON: None available.    TECHNIQUE: Duplex sonography of the LEFT LOWER extremity veins with color   and spectral Doppler, with and without compression.    FINDINGS:    There is normal compressibility of the left common femoral, femoral and   popliteal veins.  The contralateral common femoral vein is patent.  Doppler examination shows normal spontaneous and phasic flow.    No calf vein thrombosis is detected.    IMPRESSION:  No evidence of left lower extremity deep venous thrombosis.            < end of copied text >  < from: Xray Foot AP + Lateral + Oblique, Left (04.11.24 @ 15:29) >      < end of copied text >  < from: Xray Foot AP + Lateral + Oblique, Left (04.11.24 @ 15:29) >      INTERPRETATION:  Bilateral ankles and left foot. Patient has local pain   and swelling.    COMPARISON: None available.    Left ankle. 3 views.    Arterial calcifications are noted.    There are some calcifications starting posterior to the posterior   calcaneus proceeding superiorly. There is mild degeneration of the   malleolar tips. No fracture.    Right ankle. 3 views.    There has been talar calcaneal fusion and also fusion of the lower fibula   with these 2 bones. Orthopedic hardware is seen over the lower medial   tibial area.    There is an inferior calcaneal spur.    No bone destruction or acute fracture.    Left foot. 3 views.    There is mild first MTP degeneration and mild swelling of the dorsum of   the foot. No fracture.    IMPRESSION: No acute fracture. Mild swelling dorsum left foot. Old fusion   of the right ankle joint with some orthopedic hardware.    --- End of Report ---    < end of copied text >      < from: CT Foot w/ IV Cont, Left (04.11.24 @ 19:27) >    INTERPRETATION:  Exam Type: CT FOOT WITH IV CONTRAST LEFT  Exam Date and Time: 4/11/2024 7:27 PM  Indication: Left foot pain. Evaluate forabscess.  Comparison: Same-day foot and ankle x-rays.    TECHNIQUE:  Multiplanar CT images of the left foot were obtained after administration   of 90 cc of Omnipaque 350 (10 cc discarded).    FINDINGS:  Evaluation of the left foot demonstrate juxtaarticular erosive changes at   the tibiotalar joint asymmetric to the distal lateral margin of the   tibia. Extensive mineralization is identified within the tibiotalar   joint. Similar findings of mineralization is seen about the first MTP   joint as well as at some of the TMT articulations. However, discrete   osseous erosions are not identified at these locations. There is no   osseous destruction or aggressive periosteal reaction identified. No   fracture or dislocation.    There is degenerative arthrosis involving the naviculocuneiform   articulation. No advanced productive changes are seen across the Lisfranc   interval.    Within the soft tissues, there is diffuse swelling identified along the   dorsal foot. There is fluid about the first TMT joint which decompresses   along the dorsal surface of the midfoot/forefoot. This fluid collection   along the dorsal midfoot/forefoot measures approximately 2.5 cm in the AP   dimension and approximately 2.9 cm in the transverse dimension. There is   peripheral enhancement with lack of central enhancement.    Mineralization is seen involving the distal insertional fibers of the   Achilles tendon as well as within the posterior tibialis tendon.   Otherwise, the imaged tendons are grossly intact.      IMPRESSION:  1.  Findings of gouty arthropathy about the foot as described. No CT   findings to suggest osteomyelitis.  2.  Peripheral enhancing fluid collection seen about the dorsal foot   about the first TMT joint as described is also felt to related to the   gouty arthropathy. An abscess is a less likely consideration but cannot   be entirely excluded on this examination.    < end of copied text >      CULTURES:

## 2024-04-15 NOTE — PROGRESS NOTE ADULT - SUBJECTIVE AND OBJECTIVE BOX
73y Male    Meds:  ampicillin/sulbactam  IVPB      ampicillin/sulbactam  IVPB 3 Gram(s) IV Intermittent every 6 hours    Allergies    No Known Allergies    Intolerances        VITALS:  Vital Signs Last 24 Hrs  T(C): 36.5 (15 Apr 2024 14:08), Max: 37.2 (14 Apr 2024 20:51)  T(F): 97.7 (15 Apr 2024 14:08), Max: 98.9 (14 Apr 2024 20:51)  HR: 72 (15 Apr 2024 14:08) (72 - 79)  BP: 121/67 (15 Apr 2024 14:08) (121/67 - 130/75)  BP(mean): 93 (14 Apr 2024 20:51) (93 - 93)  RR: 16 (15 Apr 2024 14:08) (16 - 18)  SpO2: 96% (15 Apr 2024 14:08) (96% - 96%)    Parameters below as of 15 Apr 2024 14:08  Patient On (Oxygen Delivery Method): room air        LABS/DIAGNOSTIC TESTS:                          13.3   13.11 )-----------( 389      ( 15 Apr 2024 06:10 )             41.5         04-15    141  |  107  |  16  ----------------------------<  121<H>  4.3   |  26  |  0.84    Ca    10.2      15 Apr 2024 06:10  Phos  4.2     04-15  Mg     2.8     04-15    TPro  7.5  /  Alb  2.9<L>  /  TBili  0.6  /  DBili  x   /  AST  128<H>  /  ALT  145<H>  /  AlkPhos  242<H>  04-15      LIVER FUNCTIONS - ( 15 Apr 2024 06:10 )  Alb: 2.9 g/dL / Pro: 7.5 g/dL / ALK PHOS: 242 U/L / ALT: 145 U/L DA / AST: 128 U/L / GGT: x             CULTURES: .Blood Blood  04-13 @ 06:00   No growth at 48 Hours  --  --      .Blood Blood  04-13 @ 05:55   No growth at 48 Hours  --  --      .Surgical Swab left foot wound  04-12 @ 16:20   Moderate Streptococcus dysgalactiae (Group C/G) "Susceptibilities not  performed"  --  --      .Blood Blood-Peripheral  04-11 @ 14:45   Growth in aerobic and anaerobic bottles: Staphylococcus epidermidis  "Susceptibilities not performed"  Growth in aerobic bottle: Staphylococcus hominis "Susceptibilities not  performed"  Growth in aerobic bottle: Staphylococcus petrasii "Susceptibilities not  performed"  --    Growth in aerobic bottle: Gram Positive Cocci in Clusters  Growth in anaerobic bottle: Gram Positive Cocci in Clusters      .Blood Blood  04-11 @ 14:40   Growth in aerobic and anaerobic bottles: Staphylococcus epidermidis  Growth in aerobic bottle: Staphylococcus hominis  Growth in anaerobic bottle: Coag Negative Staphylococcus Unable to  Identify further "Susceptibilities not performed"  Direct identification is available within approximately 3-5  hours either by Blood Panel Multiplexed PCR or Direct  MALDI-TOF. Details: [email or web address]  --  Blood Culture PCR  Staphylococcus epidermidis  Staphylococcus hominis            RADIOLOGY:      ROS:  [  ] UNABLE TO ELICIT 73y Male who is doing much better clinically , he has mild left foot pain still but has no fevers or chills, no diarrhea or other complaints. His WBC count is decreasing.    Meds:  ampicillin/sulbactam  IVPB      ampicillin/sulbactam  IVPB 3 Gram(s) IV Intermittent every 6 hours    Allergies    No Known Allergies    Intolerances        VITALS:  Vital Signs Last 24 Hrs  T(C): 36.5 (15 Apr 2024 14:08), Max: 37.2 (14 Apr 2024 20:51)  T(F): 97.7 (15 Apr 2024 14:08), Max: 98.9 (14 Apr 2024 20:51)  HR: 72 (15 Apr 2024 14:08) (72 - 79)  BP: 121/67 (15 Apr 2024 14:08) (121/67 - 130/75)  BP(mean): 93 (14 Apr 2024 20:51) (93 - 93)  RR: 16 (15 Apr 2024 14:08) (16 - 18)  SpO2: 96% (15 Apr 2024 14:08) (96% - 96%)    Parameters below as of 15 Apr 2024 14:08  Patient On (Oxygen Delivery Method): room air        LABS/DIAGNOSTIC TESTS:                          13.3   13.11 )-----------( 389      ( 15 Apr 2024 06:10 )             41.5         04-15    141  |  107  |  16  ----------------------------<  121<H>  4.3   |  26  |  0.84    Ca    10.2      15 Apr 2024 06:10  Phos  4.2     04-15  Mg     2.8     04-15    TPro  7.5  /  Alb  2.9<L>  /  TBili  0.6  /  DBili  x   /  AST  128<H>  /  ALT  145<H>  /  AlkPhos  242<H>  04-15      LIVER FUNCTIONS - ( 15 Apr 2024 06:10 )  Alb: 2.9 g/dL / Pro: 7.5 g/dL / ALK PHOS: 242 U/L / ALT: 145 U/L DA / AST: 128 U/L / GGT: x             CULTURES: .Blood Blood  04-13 @ 06:00   No growth at 48 Hours  --  --      .Blood Blood  04-13 @ 05:55   No growth at 48 Hours  --  --      .Surgical Swab left foot wound  04-12 @ 16:20   Moderate Streptococcus dysgalactiae (Group C/G) "Susceptibilities not  performed"  --  --      .Blood Blood-Peripheral  04-11 @ 14:45   Growth in aerobic and anaerobic bottles: Staphylococcus epidermidis  "Susceptibilities not performed"  Growth in aerobic bottle: Staphylococcus hominis "Susceptibilities not  performed"  Growth in aerobic bottle: Staphylococcus petrasii "Susceptibilities not  performed"  --    Growth in aerobic bottle: Gram Positive Cocci in Clusters  Growth in anaerobic bottle: Gram Positive Cocci in Clusters      .Blood Blood  04-11 @ 14:40   Growth in aerobic and anaerobic bottles: Staphylococcus epidermidis  Growth in aerobic bottle: Staphylococcus hominis  Growth in anaerobic bottle: Coag Negative Staphylococcus Unable to  Identify further "Susceptibilities not performed"  Direct identification is available within approximately 3-5  hours either by Blood Panel Multiplexed PCR or Direct  MALDI-TOF. Details: [email or web address]  --  Blood Culture PCR  Staphylococcus epidermidis  Staphylococcus hominis            RADIOLOGY:      ROS:  [  ] UNABLE TO ELICIT

## 2024-04-15 NOTE — PROGRESS NOTE ADULT - PROBLEM SELECTOR PLAN 1
P/W right foot increased ecchymosis on dorsal foot. Erythema, and edema noted to the left foot.   Xray foot- Mild swelling dorsum left foot. Old fusion of the right ankle joint with some orthopedic hardware.  Marlon duplex- Neg for DVT   CT as above   switched to Unasyn if improved continue Augmentin outpatient  pain management   podiatry following  ID consulted

## 2024-04-15 NOTE — PROGRESS NOTE ADULT - SUBJECTIVE AND OBJECTIVE BOX
NP Note discussed with  Primary Attending    Patient is a 73y old  Male who presents with a chief complaint of right foot cellulitis (12 Apr 2024 11:58)      INTERVAL HPI/OVERNIGHT EVENTS: no new complaints    MEDICATIONS  (STANDING):  ampicillin/sulbactam  IVPB      ampicillin/sulbactam  IVPB 3 Gram(s) IV Intermittent every 6 hours  enoxaparin Injectable 40 milliGRAM(s) SubCutaneous every 24 hours  lactated ringers. 1000 milliLiter(s) (75 mL/Hr) IV Continuous <Continuous>    MEDICATIONS  (PRN):  acetaminophen     Tablet .. 650 milliGRAM(s) Oral every 6 hours PRN Temp greater or equal to 38C (100.4F), Mild Pain (1 - 3)  ondansetron Injectable 4 milliGRAM(s) IV Push every 8 hours PRN Nausea and/or Vomiting      __________________________________________________  REVIEW OF SYSTEMS:    CONSTITUTIONAL: No fever,   EYES: no acute visual disturbances  NECK: No pain or stiffness  RESPIRATORY: No cough; No shortness of breath  CARDIOVASCULAR: No chest pain, no palpitations  GASTROINTESTINAL: No pain. No nausea or vomiting; No diarrhea   NEUROLOGICAL: No headache or numbness, no tremors  MUSCULOSKELETAL: No joint pain, no muscle pain  GENITOURINARY: no dysuria, no frequency, no hesitancy  PSYCHIATRY: no depression , no anxiety  ALL OTHER  ROS negative        Vital Signs Last 24 Hrs  T(C): 36.8 (15 Apr 2024 05:33), Max: 37.2 (14 Apr 2024 20:51)  T(F): 98.2 (15 Apr 2024 05:33), Max: 98.9 (14 Apr 2024 20:51)  HR: 73 (15 Apr 2024 05:33) (72 - 79)  BP: 129/75 (15 Apr 2024 05:33) (118/56 - 130/75)  BP(mean): 93 (14 Apr 2024 20:51) (93 - 93)  RR: 18 (15 Apr 2024 05:33) (18 - 18)  SpO2: 96% (15 Apr 2024 05:33) (96% - 96%)    Parameters below as of 15 Apr 2024 05:33  Patient On (Oxygen Delivery Method): room air        ________________________________________________  PHYSICAL EXAM:  GENERAL: NAD  HEENT: Normocephalic;  conjunctivae and sclerae clear; moist mucous membranes;   NECK : supple  CHEST/LUNG: Clear to auscultation bilaterally with good air entry   HEART: S1 S2  regular; no murmurs, gallops or rubs  ABDOMEN: Soft, Nontender, Nondistended; Bowel sounds present  EXTREMITIES: no cyanosis; no edema; no calf tenderness  SKIN: warm and dry; no rash  NERVOUS SYSTEM:  Awake and alert; Oriented  to place, person and time ; no new deficits    _________________________________________________  LABS:                        13.3   13.11 )-----------( 389      ( 15 Apr 2024 06:10 )             41.5     04-15    141  |  107  |  16  ----------------------------<  121<H>  4.3   |  26  |  0.84    Ca    10.2      15 Apr 2024 06:10  Phos  4.2     04-15  Mg     2.8     04-15    TPro  7.5  /  Alb  2.9<L>  /  TBili  0.6  /  DBili  x   /  AST  128<H>  /  ALT  145<H>  /  AlkPhos  242<H>  04-15      Urinalysis Basic - ( 15 Apr 2024 06:10 )    Color: x / Appearance: x / SG: x / pH: x  Gluc: 121 mg/dL / Ketone: x  / Bili: x / Urobili: x   Blood: x / Protein: x / Nitrite: x   Leuk Esterase: x / RBC: x / WBC x   Sq Epi: x / Non Sq Epi: x / Bacteria: x      CAPILLARY BLOOD GLUCOSE            RADIOLOGY & ADDITIONAL TESTS:    Imaging  Reviewed:  YES/NO    Consultant(s) Notes Reviewed:   YES/ No      Plan of care was discussed with patient and /or primary care giver; all questions and concerns were addressed  NP Note discussed with  Primary Attending    Patient is a 73y old  Male who presents with a chief complaint of right foot cellulitis (12 Apr 2024 11:58)      INTERVAL HPI/OVERNIGHT EVENTS: no new complaints    MEDICATIONS  (STANDING):  ampicillin/sulbactam  IVPB      ampicillin/sulbactam  IVPB 3 Gram(s) IV Intermittent every 6 hours  enoxaparin Injectable 40 milliGRAM(s) SubCutaneous every 24 hours  lactated ringers. 1000 milliLiter(s) (75 mL/Hr) IV Continuous <Continuous>    MEDICATIONS  (PRN):  acetaminophen     Tablet .. 650 milliGRAM(s) Oral every 6 hours PRN Temp greater or equal to 38C (100.4F), Mild Pain (1 - 3)  ondansetron Injectable 4 milliGRAM(s) IV Push every 8 hours PRN Nausea and/or Vomiting      __________________________________________________  REVIEW OF SYSTEMS:    CONSTITUTIONAL: No fever,   EYES: no acute visual disturbances  NECK: No pain or stiffness  RESPIRATORY: No cough; No shortness of breath  CARDIOVASCULAR: No chest pain, no palpitations  GASTROINTESTINAL: No pain. No nausea or vomiting; No diarrhea   NEUROLOGICAL: No headache or numbness, no tremors  MUSCULOSKELETAL: No joint pain, no muscle pain  GENITOURINARY: no dysuria, no frequency, no hesitancy  PSYCHIATRY: no depression , no anxiety  ALL OTHER  ROS negative        Vital Signs Last 24 Hrs  T(C): 36.8 (15 Apr 2024 05:33), Max: 37.2 (14 Apr 2024 20:51)  T(F): 98.2 (15 Apr 2024 05:33), Max: 98.9 (14 Apr 2024 20:51)  HR: 73 (15 Apr 2024 05:33) (72 - 79)  BP: 129/75 (15 Apr 2024 05:33) (118/56 - 130/75)  BP(mean): 93 (14 Apr 2024 20:51) (93 - 93)  RR: 18 (15 Apr 2024 05:33) (18 - 18)  SpO2: 96% (15 Apr 2024 05:33) (96% - 96%)    Parameters below as of 15 Apr 2024 05:33  Patient On (Oxygen Delivery Method): room air        ________________________________________________  PHYSICAL EXAM:  GENERAL: NAD  HEENT: Normocephalic;  conjunctivae and sclerae clear; moist mucous membranes;   NECK : supple  CHEST/LUNG: Clear to auscultation bilaterally with good air entry   HEART: S1 S2  regular; no murmurs, gallops or rubs  ABDOMEN: Soft, Nontender, Nondistended; Bowel sounds present  EXTREMITIES: Left foot ace bandage intact, no cyanosis; no edema; no calf tenderness  SKIN: warm and dry; no rash  NERVOUS SYSTEM:  Awake and alert; Oriented  to place, person and time ; no new deficits    _________________________________________________  LABS:                        13.3   13.11 )-----------( 389      ( 15 Apr 2024 06:10 )             41.5     04-15    141  |  107  |  16  ----------------------------<  121<H>  4.3   |  26  |  0.84    Ca    10.2      15 Apr 2024 06:10  Phos  4.2     04-15  Mg     2.8     04-15    TPro  7.5  /  Alb  2.9<L>  /  TBili  0.6  /  DBili  x   /  AST  128<H>  /  ALT  145<H>  /  AlkPhos  242<H>  04-15      Urinalysis Basic - ( 15 Apr 2024 06:10 )    Color: x / Appearance: x / SG: x / pH: x  Gluc: 121 mg/dL / Ketone: x  / Bili: x / Urobili: x   Blood: x / Protein: x / Nitrite: x   Leuk Esterase: x / RBC: x / WBC x   Sq Epi: x / Non Sq Epi: x / Bacteria: x      CAPILLARY BLOOD GLUCOSE            RADIOLOGY & ADDITIONAL TESTS:  < from: CT Foot w/ IV Cont, Left (04.11.24 @ 19:27) >  ACC: [id number] EXAM:  CT FOOT ONLY IC LT   ORDERED BY: UZIEL FERGUSON     PROCEDURE DATE:  04/11/2024          INTERPRETATION:  Exam Type: CT FOOT WITH IV CONTRAST LEFT  Exam Date and Time: 4/11/2024 7:27 PM  Indication: Left foot pain. Evaluate forabscess.  Comparison: Same-day foot and ankle x-rays.    TECHNIQUE:  Multiplanar CT images of the left foot were obtained after administration   of 90 cc of Omnipaque 350 (10 cc discarded).    FINDINGS:  Evaluation of the left foot demonstrate juxtaarticular erosive changes at   the tibiotalar joint asymmetric to the distal lateral margin of the   tibia. Extensive mineralization is identified within the tibiotalar   joint. Similar findings of mineralization is seen about the first MTP   joint as well as at some of the TMT articulations. However, discrete   osseous erosions are not identified at these locations. There is no   osseous destruction or aggressive periosteal reaction identified. No   fracture or dislocation.    There is degenerative arthrosis involving the naviculocuneiform   articulation. No advanced productive changes are seen across the Lisfranc   interval.    Within the soft tissues, there is diffuse swelling identified along the   dorsal foot. There is fluid about the first TMT joint which decompresses   along the dorsal surface of the midfoot/forefoot. This fluid collection   along the dorsal midfoot/forefoot measures approximately 2.5 cm in the AP   dimension and approximately 2.9 cm in the transverse dimension. There is   peripheral enhancement with lack of central enhancement.    Mineralization is seen involving the distal insertional fibers of the   Achilles tendon as well as within the posterior tibialis tendon.   Otherwise, the imaged tendons are grossly intact.      IMPRESSION:  1.  Findings of gouty arthropathy about the foot as described. No CT   findings to suggest osteomyelitis.  2.  Peripheral enhancing fluid collection seen about the dorsal foot   about the first TMT joint as described is also felt to related to the   gouty arthropathy. An abscess is a less likely consideration but cannot   be entirely excluded on this examination.    --- End of Report ---      < end of copied text >  < from: US Duplex Venous Lower Ext Ltd, Left (04.11.24 @ 15:49) >    ACC: [id number] EXAM:  US DPLX LWR EXT VEINS LTD LT   ORDERED BY: ZAID CURRY     PROCEDURE DATE:  04/11/2024          INTERPRETATION:  CLINICAL INFORMATION: Left foot pain    COMPARISON: None available.    TECHNIQUE: Duplex sonography of the LEFT LOWER extremity veins with color   and spectral Doppler, with and without compression.    FINDINGS:    There is normal compressibility of the left common femoral, femoral and   popliteal veins.  The contralateral common femoral vein is patent.  Doppler examination shows normal spontaneous and phasic flow.    No calf vein thrombosis is detected.    IMPRESSION:  No evidence of left lower extremity deep venous thrombosis.            --- End of Report ---      < end of copied text >    Imaging  Reviewed:  YES    Consultant(s) Notes Reviewed:   YES      Plan of care was discussed with patient and /or primary care giver; all questions and concerns were addressed

## 2024-04-15 NOTE — PROGRESS NOTE ADULT - SKIN COMMENTS
left redness is dramatically better , barely any warmth , he still has a very small amount of purulent material expressed from his abscess site
left foot redness and warmth present but less than before , no fluctuance, mild tenderness +

## 2024-04-15 NOTE — PROGRESS NOTE ADULT - PROBLEM SELECTOR PLAN 3
holding BP meds in the setting of ? sepsis   Monitor BP  DASH/TLC diet  resume home meds when indicated

## 2024-04-15 NOTE — PROGRESS NOTE ADULT - NS ATTEND AMEND GEN_ALL_CORE FT
73 y.o. M from home with PMHx of HLD, prediabetes, SLE on hydroxychloroquine presents with left foot pain over the past 4 days. Admitted to medicine for L foot cellultis with abscess and gram + cocci in clusters bacteremia    #Sepsis secondary to gram positive bacteremia  # Left foot cellulitis w/ abscess  #Gouty arthritis  #SLE  #preDM  #HLD    -no new complaints.   - improving. no further intervention planned by podiatry for now, d/w / Nasreen- can change to po atbx tomorrow for 7 more days for discharge.   -, followup surgical swab- podiatry drain further pus today 4/13/24  -no MRI for now- will follow up with podiatry for further recs  Prediabetes needs more effective lifestyle modifications, AM sugars appears well controlled, continue carb consistent diet  Unclear if abscess is related to joint per CT findings  Hydroxychloroquine was discontinued  Uric Acid level controlled  -wbc trending down, rpt cbc, bmp mg phos in the am

## 2024-04-15 NOTE — PROGRESS NOTE ADULT - ASSESSMENT
Left Foot Cellulitis / Abscess - s/ p I&D  Fevers - resolved  Leukocytosis - decreasing  Bacteremia -  contaminant    Plan -Cont  Unasyn 3gms iv q6hrs  plan to DC home tomorrow on augmentin 875mgs po bid x 7 days more.  reconsult prn.

## 2024-04-16 DIAGNOSIS — R74.01 ELEVATION OF LEVELS OF LIVER TRANSAMINASE LEVELS: ICD-10-CM

## 2024-04-16 LAB
ALBUMIN SERPL ELPH-MCNC: 3 G/DL — LOW (ref 3.5–5)
ALP SERPL-CCNC: 249 U/L — HIGH (ref 40–120)
ALT FLD-CCNC: 249 U/L DA — HIGH (ref 10–60)
ANION GAP SERPL CALC-SCNC: 9 MMOL/L — SIGNIFICANT CHANGE UP (ref 5–17)
AST SERPL-CCNC: 144 U/L — HIGH (ref 10–40)
BILIRUB SERPL-MCNC: 0.5 MG/DL — SIGNIFICANT CHANGE UP (ref 0.2–1.2)
BUN SERPL-MCNC: 19 MG/DL — HIGH (ref 7–18)
CALCIUM SERPL-MCNC: 9.3 MG/DL — SIGNIFICANT CHANGE UP (ref 8.4–10.5)
CHLORIDE SERPL-SCNC: 110 MMOL/L — HIGH (ref 96–108)
CO2 SERPL-SCNC: 23 MMOL/L — SIGNIFICANT CHANGE UP (ref 22–31)
CREAT SERPL-MCNC: 0.77 MG/DL — SIGNIFICANT CHANGE UP (ref 0.5–1.3)
EGFR: 95 ML/MIN/1.73M2 — SIGNIFICANT CHANGE UP
GLUCOSE SERPL-MCNC: 125 MG/DL — HIGH (ref 70–99)
HCT VFR BLD CALC: 40.7 % — SIGNIFICANT CHANGE UP (ref 39–50)
HGB BLD-MCNC: 13.3 G/DL — SIGNIFICANT CHANGE UP (ref 13–17)
MAGNESIUM SERPL-MCNC: 2.5 MG/DL — SIGNIFICANT CHANGE UP (ref 1.6–2.6)
MCHC RBC-ENTMCNC: 30.6 PG — SIGNIFICANT CHANGE UP (ref 27–34)
MCHC RBC-ENTMCNC: 32.7 GM/DL — SIGNIFICANT CHANGE UP (ref 32–36)
MCV RBC AUTO: 93.8 FL — SIGNIFICANT CHANGE UP (ref 80–100)
NRBC # BLD: 0 /100 WBCS — SIGNIFICANT CHANGE UP (ref 0–0)
PHOSPHATE SERPL-MCNC: 3.8 MG/DL — SIGNIFICANT CHANGE UP (ref 2.5–4.5)
PLATELET # BLD AUTO: 413 K/UL — HIGH (ref 150–400)
POTASSIUM SERPL-MCNC: 4.3 MMOL/L — SIGNIFICANT CHANGE UP (ref 3.5–5.3)
POTASSIUM SERPL-SCNC: 4.3 MMOL/L — SIGNIFICANT CHANGE UP (ref 3.5–5.3)
PROT SERPL-MCNC: 7.4 G/DL — SIGNIFICANT CHANGE UP (ref 6–8.3)
RBC # BLD: 4.34 M/UL — SIGNIFICANT CHANGE UP (ref 4.2–5.8)
RBC # FLD: 13.2 % — SIGNIFICANT CHANGE UP (ref 10.3–14.5)
SODIUM SERPL-SCNC: 142 MMOL/L — SIGNIFICANT CHANGE UP (ref 135–145)
WBC # BLD: 12.95 K/UL — HIGH (ref 3.8–10.5)
WBC # FLD AUTO: 12.95 K/UL — HIGH (ref 3.8–10.5)

## 2024-04-16 PROCEDURE — 73718 MRI LOWER EXTREMITY W/O DYE: CPT | Mod: 26,LT

## 2024-04-16 PROCEDURE — 99233 SBSQ HOSP IP/OBS HIGH 50: CPT

## 2024-04-16 PROCEDURE — 76705 ECHO EXAM OF ABDOMEN: CPT | Mod: 26

## 2024-04-16 RX ADMIN — AMPICILLIN SODIUM AND SULBACTAM SODIUM 200 GRAM(S): 250; 125 INJECTION, POWDER, FOR SUSPENSION INTRAMUSCULAR; INTRAVENOUS at 12:49

## 2024-04-16 RX ADMIN — Medication 650 MILLIGRAM(S): at 12:50

## 2024-04-16 RX ADMIN — AMPICILLIN SODIUM AND SULBACTAM SODIUM 200 GRAM(S): 250; 125 INJECTION, POWDER, FOR SUSPENSION INTRAMUSCULAR; INTRAVENOUS at 23:22

## 2024-04-16 RX ADMIN — AMPICILLIN SODIUM AND SULBACTAM SODIUM 200 GRAM(S): 250; 125 INJECTION, POWDER, FOR SUSPENSION INTRAMUSCULAR; INTRAVENOUS at 17:40

## 2024-04-16 RX ADMIN — Medication 650 MILLIGRAM(S): at 13:20

## 2024-04-16 RX ADMIN — ENOXAPARIN SODIUM 40 MILLIGRAM(S): 100 INJECTION SUBCUTANEOUS at 06:17

## 2024-04-16 RX ADMIN — AMPICILLIN SODIUM AND SULBACTAM SODIUM 200 GRAM(S): 250; 125 INJECTION, POWDER, FOR SUSPENSION INTRAMUSCULAR; INTRAVENOUS at 05:14

## 2024-04-16 NOTE — DIETITIAN INITIAL EVALUATION ADULT - OTHER INFO
reports No weight loss. Has No food allergies. conducted NFPE and found some areas To be moderately depleted in fat and muscle and other not.

## 2024-04-16 NOTE — PROGRESS NOTE ADULT - PROBLEM SELECTOR PLAN 1
P/W right foot increased ecchymosis on dorsal foot. Erythema, and edema noted to the left foot.   Xray foot- Mild swelling dorsum left foot. Old fusion of the right ankle joint with some orthopedic hardware.  Marlon duplex- Neg for DVT   CT as above   switched to Unasyn if improved continue Augmentin outpatient  f/u MRI left foot  pain management   podiatry following  ID consulted

## 2024-04-16 NOTE — PROGRESS NOTE ADULT - SUBJECTIVE AND OBJECTIVE BOX
[Subsequent Evaluation] : a subsequent evaluation for [FreeTextEntry2] : clogged left ear NP Note discussed with  Primary Attending    Patient is a 73y old  Male who presents with a chief complaint of right foot cellulitis (12 Apr 2024 11:58)      INTERVAL HPI/OVERNIGHT EVENTS: no new complaints    MEDICATIONS  (STANDING):  ampicillin/sulbactam  IVPB      ampicillin/sulbactam  IVPB 3 Gram(s) IV Intermittent every 6 hours  enoxaparin Injectable 40 milliGRAM(s) SubCutaneous every 24 hours  lactated ringers. 1000 milliLiter(s) (75 mL/Hr) IV Continuous <Continuous>    MEDICATIONS  (PRN):  acetaminophen     Tablet .. 650 milliGRAM(s) Oral every 6 hours PRN Temp greater or equal to 38C (100.4F), Mild Pain (1 - 3)  ondansetron Injectable 4 milliGRAM(s) IV Push every 8 hours PRN Nausea and/or Vomiting      __________________________________________________  REVIEW OF SYSTEMS:    CONSTITUTIONAL: No fever,   EYES: no acute visual disturbances  NECK: No pain or stiffness  RESPIRATORY: No cough; No shortness of breath  CARDIOVASCULAR: No chest pain, no palpitations  GASTROINTESTINAL: No pain. No nausea or vomiting; No diarrhea   NEUROLOGICAL: No headache or numbness, no tremors  MUSCULOSKELETAL: No joint pain, no muscle pain  GENITOURINARY: no dysuria, no frequency, no hesitancy  PSYCHIATRY: no depression , no anxiety  ALL OTHER  ROS negative        Vital Signs Last 24 Hrs  T(C): 36.6 (16 Apr 2024 13:57), Max: 37.3 (15 Apr 2024 20:02)  T(F): 97.8 (16 Apr 2024 13:57), Max: 99.2 (15 Apr 2024 20:02)  HR: 76 (16 Apr 2024 13:57) (76 - 80)  BP: 127/70 (16 Apr 2024 13:57) (111/68 - 127/70)  BP(mean): --  RR: 16 (16 Apr 2024 13:57) (16 - 17)  SpO2: 95% (16 Apr 2024 13:57) (95% - 97%)    Parameters below as of 16 Apr 2024 13:57  Patient On (Oxygen Delivery Method): room air        ________________________________________________  PHYSICAL EXAM:  GENERAL: NAD  HEENT: Normocephalic;  conjunctivae and sclerae clear; moist mucous membranes;   NECK : supple  CHEST/LUNG: Clear to auscultation bilaterally with good air entry   HEART: S1 S2  regular; no murmurs, gallops or rubs  ABDOMEN: Soft, Nontender, Nondistended; Bowel sounds present  EXTREMITIES: Left foot ace bandage intact, no cyanosis; no edema; no calf tenderness  SKIN: warm and dry; no rash  NERVOUS SYSTEM:  Awake and alert; Oriented  to place, person and time ; no new deficits      _________________________________________________  LABS:                        13.3   12.95 )-----------( 413      ( 16 Apr 2024 06:38 )             40.7     04-16    142  |  110<H>  |  19<H>  ----------------------------<  125<H>  4.3   |  23  |  0.77    Ca    9.3      16 Apr 2024 06:38  Phos  3.8     04-16  Mg     2.5     04-16    TPro  7.4  /  Alb  3.0<L>  /  TBili  0.5  /  DBili  x   /  AST  144<H>  /  ALT  249<H>  /  AlkPhos  249<H>  04-16      Urinalysis Basic - ( 16 Apr 2024 06:38 )    Color: x / Appearance: x / SG: x / pH: x  Gluc: 125 mg/dL / Ketone: x  / Bili: x / Urobili: x   Blood: x / Protein: x / Nitrite: x   Leuk Esterase: x / RBC: x / WBC x   Sq Epi: x / Non Sq Epi: x / Bacteria: x      CAPILLARY BLOOD GLUCOSE            RADIOLOGY & ADDITIONAL TESTS:  < from: US Abdomen Upper Quadrant Right (04.16.24 @ 11:57) >    ACC: [id number] EXAM:  US ABDOMEN RT UPR QUADRANT   ORDERED BY: REYNA SORTO     PROCEDURE DATE:  04/16/2024          INTERPRETATION:  CLINICAL INFORMATION: Right upper quadrant transaminitis.    COMPARISON: None available.    TECHNIQUE: Sonography of the right upper quadrant.    FINDINGS:  Liver: Within normal limits.  Bile ducts: Normal caliber. Common bile duct measures 6 mm in caliber   which is within normal limits.  Gallbladder: Within normal limits.  Pancreas: Visualized portions are within normal limits.  Right kidney: 11.7 cm. No hydronephrosis. 6.5 x 7.6 x 6.9 cm.  Ascites: None.  IVC: Visualized portions are within normal limits.    IMPRESSION:  7.6 cm right renal cyst; otherwise, unremarkable right upper quadrant   abdominal ultrasound.    --- End of Report ---    < end of copied text >  < from: CT Foot w/ IV Cont, Left (04.11.24 @ 19:27) >  ACC: [id number] EXAM:  CT FOOT ONLY IC LT   ORDERED BY: UZIEL FERGUSON     PROCEDURE DATE:  04/11/2024          INTERPRETATION:  Exam Type: CT FOOT WITH IV CONTRAST LEFT  Exam Date and Time: 4/11/2024 7:27 PM  Indication: Left foot pain. Evaluate forabscess.  Comparison: Same-day foot and ankle x-rays.    TECHNIQUE:  Multiplanar CT images of the left foot were obtained after administration   of 90 cc of Omnipaque 350 (10 cc discarded).    FINDINGS:  Evaluation of the left foot demonstrate juxtaarticular erosive changes at   the tibiotalar joint asymmetric to the distal lateral margin of the   tibia. Extensive mineralization is identified within the tibiotalar   joint. Similar findings of mineralization is seen about the first MTP   joint as well as at some of the TMT articulations. However, discrete   osseous erosions are not identified at these locations. There is no   osseous destruction or aggressive periosteal reaction identified. No   fracture or dislocation.    There is degenerative arthrosis involving the naviculocuneiform   articulation. No advanced productive changes are seen across the Lisfranc   interval.    Within the soft tissues, there is diffuse swelling identified along the   dorsal foot. There is fluid about the first TMT joint which decompresses   along the dorsal surface of the midfoot/forefoot. This fluid collection   along the dorsal midfoot/forefoot measures approximately 2.5 cm in the AP   dimension and approximately 2.9 cm in the transverse dimension. There is   peripheral enhancement with lack of central enhancement.    Mineralization is seen involving the distal insertional fibers of the   Achilles tendon as well as within the posterior tibialis tendon.   Otherwise, the imaged tendons are grossly intact.      IMPRESSION:  1.  Findings of gouty arthropathy about the foot as described. No CT   findings to suggest osteomyelitis.  2.  Peripheral enhancing fluid collection seen about the dorsal foot   about the first TMT joint as described is also felt to related to the   gouty arthropathy. An abscess is a less likely consideration but cannot   be entirely excluded on this examination.    --- End of Report ---      < end of copied text >  < from: US Duplex Venous Lower Ext Ltd, Left (04.11.24 @ 15:49) >    ACC: [id number] EXAM:  US DPLX LWR EXT VEINS LTD LT   ORDERED BY: ZAID CURRY     PROCEDURE DATE:  04/11/2024          INTERPRETATION:  CLINICAL INFORMATION: Left foot pain    COMPARISON: None available.    TECHNIQUE: Duplex sonography of the LEFT LOWER extremity veins with color   and spectral Doppler, with and without compression.    FINDINGS:    There is normal compressibility of the left common femoral, femoral and   popliteal veins.  The contralateral common femoral vein is patent.  Doppler examination shows normal spontaneous and phasic flow.    No calf vein thrombosis is detected.    IMPRESSION:  No evidence of left lower extremity deep venous thrombosis.            --- End of Report ---              < end of copied text >  < from: Xray Foot AP + Lateral + Oblique, Left (04.11.24 @ 15:29) >  IMPRESSION: No acute fracture. Mild swelling dorsum left foot. Old fusion   of the right ankle joint with some orthopedic hardware.    --- End of Report ---      < end of copied text >  < from: TTE W or WO Ultrasound Enhancing Agent (04.13.24 @ 13:17) >  _______________________________________________________________________________________     CONCLUSIONS:      1. Left ventricular cavity is normal in size. Left ventricular wall thickness is normal. Left ventricular systolic function is normal with an ejection fraction of 63 % by Adams's method of disks. There are no regional wall motion abnormalities seen.   2. There is mild (grade 1) left ventricular diastolic dysfunction.   3. Normal right ventricular cavity size, with normal wall thickness, and normal systolic function. Tricuspid annular plane systolic excursion (TAPSE) is 2.5 cm (normal >=1.7 cm). Tricuspid annular tissue Doppler S' is 12.0 cm/s (normal >10 cm/s).   4. Trace mitral regurgitation.   5. Normal left and right atrial size.   6. Trace tricuspid regurgitation. Pulmonary artery systolic pressure could not be estimated.   7. Mild aortic regurgitation.   8. There is calcification of the mitral valve annulus.   9. There is increased LV mass and eccentric hypertrophy.  10. Trace pulmonic regurgitation.  11. No pericardial effusion seen.    ________________________________________________________________________________________    < end of copied text >    Imaging  Reviewed:  YES    Consultant(s) Notes Reviewed:   YES      Plan of care was discussed with patient and /or primary care giver; all questions and concerns were addressed

## 2024-04-16 NOTE — DIETITIAN INITIAL EVALUATION ADULT - PERTINENT MEDS FT
MEDICATIONS  (STANDING):  ampicillin/sulbactam  IVPB 3 Gram(s) IV Intermittent every 6 hours  ampicillin/sulbactam  IVPB      enoxaparin Injectable 40 milliGRAM(s) SubCutaneous every 24 hours  lactated ringers. 1000 milliLiter(s) (75 mL/Hr) IV Continuous <Continuous>    MEDICATIONS  (PRN):  acetaminophen     Tablet .. 650 milliGRAM(s) Oral every 6 hours PRN Temp greater or equal to 38C (100.4F), Mild Pain (1 - 3)  ondansetron Injectable 4 milliGRAM(s) IV Push every 8 hours PRN Nausea and/or Vomiting

## 2024-04-16 NOTE — PROGRESS NOTE ADULT - ASSESSMENT
A:  Left foot infected foot    P:   Patient evaluated and Chart reviewed   Discussed diagnosis and treatment with patient  X-rays evaluated, results as noted above  CT results reviewed   Continue with IV antibiotics As Per ID  Small serous blister noted on the dorsal aspect. Blister was lanced approximately 2 cc of serous drainage was expression. Patient continues to have pain plantar left foot along flexor muscles from the hallux extending proximally to the rearfoot.   Rec MRI to r/o infection plantar left foot. No purulent drainage was expressed from the wound at this time.   Wound was dressed with betadine 4x4, ABD, kerlix, ace bandage.    Weight bearing as tolerated left foot   Podiatry to follow while in house.  Discussed with Attending Dr. Valencia     WCO: Betadine, 4x4 gauze, abd, ,jorge ace bandage to be changed every other day

## 2024-04-16 NOTE — PROGRESS NOTE ADULT - SUBJECTIVE AND OBJECTIVE BOX
Interval: Patient was seen resting bedside. Patient stated he has no pain prior to examining the foot. Patient has not be able to walk.     Patient is a 73y old  Male who presents with a chief complaint of left foot pain    HPI:73-year-old male with past medical history hyperlipidemia, prediabetes, SLE on hydroxychloroquine presents with left foot pain over the past 4 days.  Patient states he stepped on a stone 4 days ago, states shortly afterwards he start experiencing left foot redness, swelling, pain, fevers, and chills.  Patient report taking ibuprofen with little relief.  Patient seen by primary care doctor today, advised to the emergency department further evaluation.  Denies any nasal congestion, cough, chest pain, shortness of breath, abdominal pain, vomiting, numbness, or weakness.  Denies any additional complaints.      Podiatry HPI: 73-year-old male with past medical history hyperlipidemia, prediabetes, SLE on hydroxychloroquine presents with left foot pain over the past 4 days. Patient states that one monday he was walking and stepped on a stone. Patient states later that day he began to develop pain and fevers and chills. Patient states that this entire week his foot has become increasingly swollen and he has been unable to walk. Patient states he has had fevers and chills all week long. Patient went to his PCP who gave him pain meds and recommened he come to ED for further workup.       Medications acetaminophen     Tablet .. 650 milliGRAM(s) Oral every 6 hours PRN  ampicillin/sulbactam  IVPB 3 Gram(s) IV Intermittent every 6 hours  ampicillin/sulbactam  IVPB      enoxaparin Injectable 40 milliGRAM(s) SubCutaneous every 24 hours  lactated ringers. 1000 milliLiter(s) IV Continuous <Continuous>  ondansetron Injectable 4 milliGRAM(s) IV Push every 8 hours PRN    FH,   PMHOsteoarthritis    Osteoarthritis of left knee    High cholesterol       PSHNo significant past surgical history    H/O foot surgery        Labs                          13.3   12.95 )-----------( 413      ( 16 Apr 2024 06:38 )             40.7      04-16    142  |  110<H>  |  19<H>  ----------------------------<  125<H>  4.3   |  23  |  0.77    Ca    9.3      16 Apr 2024 06:38  Phos  3.8     04-16  Mg     2.5     04-16    TPro  7.4  /  Alb  3.0<L>  /  TBili  0.5  /  DBili  x   /  AST  144<H>  /  ALT  249<H>  /  AlkPhos  249<H>  04-16     Vital Signs Last 24 Hrs  T(C): 36.8 (16 Apr 2024 05:32), Max: 37.3 (15 Apr 2024 20:02)  T(F): 98.2 (16 Apr 2024 05:32), Max: 99.2 (15 Apr 2024 20:02)  HR: 80 (16 Apr 2024 05:32) (72 - 80)  BP: 124/70 (16 Apr 2024 05:32) (111/68 - 124/70)  BP(mean): --  RR: 17 (16 Apr 2024 05:32) (16 - 17)  SpO2: 96% (16 Apr 2024 05:32) (96% - 97%)    Parameters below as of 16 Apr 2024 05:32  Patient On (Oxygen Delivery Method): room air      Sedimentation Rate, Erythrocyte: 86 mm/Hr (04-11-24 @ 14:45)         C-Reactive Protein, Serum: 106 mg/L (04-14-24 @ 05:41)  C-Reactive Protein, Serum: 279 mg/L (04-11-24 @ 14:45)   WBC Count: 12.95 K/uL *H* (04-16-24 @ 06:38)      PHYSICAL EXAM  LE Focused:    Vasc: DP & PT palpable right foot. Left foot PT palpable, DP non palpable 2/2 edema. CFT <3 seconds to all 10 digits b/l. TG warm to warm left foot.   Derm: Left foot increased ecchymosis noted to the dorsal aspect of the foot. Erythema, and edema noted to the left foot. No signs of any open lesions noted. Increased temperature noted to the dorsum of the left foot. No fluctuance noted over the dorsal aspect. No soft tissue crepitus noted. combined total of 12 cc or purulent drainage has been obtained from dorsal foot wound. Further purlent drainage was expressed from the wound 4/14 approx 2 cc.   Neuro: Gross and light touch sensation intact b/l  MSK: PTP to the left foot       IMAGING: ?xray    < from: US Duplex Venous Lower Ext Ltd, Left (04.11.24 @ 15:49) >  PROCEDURE DATE:  04/11/2024          INTERPRETATION:  CLINICAL INFORMATION: Left foot pain    COMPARISON: None available.    TECHNIQUE: Duplex sonography of the LEFT LOWER extremity veins with color   and spectral Doppler, with and without compression.    FINDINGS:    There is normal compressibility of the left common femoral, femoral and   popliteal veins.  The contralateral common femoral vein is patent.  Doppler examination shows normal spontaneous and phasic flow.    No calf vein thrombosis is detected.    IMPRESSION:  No evidence of left lower extremity deep venous thrombosis.            < end of copied text >  < from: Xray Foot AP + Lateral + Oblique, Left (04.11.24 @ 15:29) >      < end of copied text >  < from: Xray Foot AP + Lateral + Oblique, Left (04.11.24 @ 15:29) >      INTERPRETATION:  Bilateral ankles and left foot. Patient has local pain   and swelling.    COMPARISON: None available.    Left ankle. 3 views.    Arterial calcifications are noted.    There are some calcifications starting posterior to the posterior   calcaneus proceeding superiorly. There is mild degeneration of the   malleolar tips. No fracture.    Right ankle. 3 views.    There has been talar calcaneal fusion and also fusion of the lower fibula   with these 2 bones. Orthopedic hardware is seen over the lower medial   tibial area.    There is an inferior calcaneal spur.    No bone destruction or acute fracture.    Left foot. 3 views.    There is mild first MTP degeneration and mild swelling of the dorsum of   the foot. No fracture.    IMPRESSION: No acute fracture. Mild swelling dorsum left foot. Old fusion   of the right ankle joint with some orthopedic hardware.    --- End of Report ---    < end of copied text >      < from: CT Foot w/ IV Cont, Left (04.11.24 @ 19:27) >    INTERPRETATION:  Exam Type: CT FOOT WITH IV CONTRAST LEFT  Exam Date and Time: 4/11/2024 7:27 PM  Indication: Left foot pain. Evaluate forabscess.  Comparison: Same-day foot and ankle x-rays.    TECHNIQUE:  Multiplanar CT images of the left foot were obtained after administration   of 90 cc of Omnipaque 350 (10 cc discarded).    FINDINGS:  Evaluation of the left foot demonstrate juxtaarticular erosive changes at   the tibiotalar joint asymmetric to the distal lateral margin of the   tibia. Extensive mineralization is identified within the tibiotalar   joint. Similar findings of mineralization is seen about the first MTP   joint as well as at some of the TMT articulations. However, discrete   osseous erosions are not identified at these locations. There is no   osseous destruction or aggressive periosteal reaction identified. No   fracture or dislocation.    There is degenerative arthrosis involving the naviculocuneiform   articulation. No advanced productive changes are seen across the Lisfranc   interval.    Within the soft tissues, there is diffuse swelling identified along the   dorsal foot. There is fluid about the first TMT joint which decompresses   along the dorsal surface of the midfoot/forefoot. This fluid collection   along the dorsal midfoot/forefoot measures approximately 2.5 cm in the AP   dimension and approximately 2.9 cm in the transverse dimension. There is   peripheral enhancement with lack of central enhancement.    Mineralization is seen involving the distal insertional fibers of the   Achilles tendon as well as within the posterior tibialis tendon.   Otherwise, the imaged tendons are grossly intact.      IMPRESSION:  1.  Findings of gouty arthropathy about the foot as described. No CT   findings to suggest osteomyelitis.  2.  Peripheral enhancing fluid collection seen about the dorsal foot   about the first TMT joint as described is also felt to related to the   gouty arthropathy. An abscess is a less likely consideration but cannot   be entirely excluded on this examination.    < end of copied text >      CULTURES:

## 2024-04-16 NOTE — PROGRESS NOTE ADULT - NS ATTEND AMEND GEN_ALL_CORE FT
#Sepsis secondary to soft tissue infection  # Left foot cellulitis w/ abscess  # Gram Positive Bacteremia(likely contamination)  #Gouty arthritis  #SLE  #preDM  #HLD    Upon reassessment this AM by podiatry, had intense pain in plantar aspect of his foot.  Initial CT scans unclear if abscess is extension from joint  - will obtain MRI for further evaluation  - last surgical swab noted  - continue unasyn with plans to change to po Augmentin when ready for discharge  Prediabetes needs more effective lifestyle modifications, AM sugars appears well controlled, continue   Hydroxychloroquine was discontinued  Uric Acid level controlled

## 2024-04-17 ENCOUNTER — TRANSCRIPTION ENCOUNTER (OUTPATIENT)
Age: 74
End: 2024-04-17

## 2024-04-17 DIAGNOSIS — R78.81 BACTEREMIA: ICD-10-CM

## 2024-04-17 DIAGNOSIS — M10.9 GOUT, UNSPECIFIED: ICD-10-CM

## 2024-04-17 DIAGNOSIS — E78.5 HYPERLIPIDEMIA, UNSPECIFIED: ICD-10-CM

## 2024-04-17 DIAGNOSIS — N28.1 CYST OF KIDNEY, ACQUIRED: ICD-10-CM

## 2024-04-17 LAB
ANION GAP SERPL CALC-SCNC: 8 MMOL/L — SIGNIFICANT CHANGE UP (ref 5–17)
BUN SERPL-MCNC: 22 MG/DL — HIGH (ref 7–18)
CALCIUM SERPL-MCNC: 8.8 MG/DL — SIGNIFICANT CHANGE UP (ref 8.4–10.5)
CHLORIDE SERPL-SCNC: 107 MMOL/L — SIGNIFICANT CHANGE UP (ref 96–108)
CO2 SERPL-SCNC: 24 MMOL/L — SIGNIFICANT CHANGE UP (ref 22–31)
CREAT SERPL-MCNC: 0.77 MG/DL — SIGNIFICANT CHANGE UP (ref 0.5–1.3)
EGFR: 95 ML/MIN/1.73M2 — SIGNIFICANT CHANGE UP
GLUCOSE SERPL-MCNC: 115 MG/DL — HIGH (ref 70–99)
HCT VFR BLD CALC: 40.2 % — SIGNIFICANT CHANGE UP (ref 39–50)
HGB BLD-MCNC: 12.9 G/DL — LOW (ref 13–17)
MCHC RBC-ENTMCNC: 30 PG — SIGNIFICANT CHANGE UP (ref 27–34)
MCHC RBC-ENTMCNC: 32.1 GM/DL — SIGNIFICANT CHANGE UP (ref 32–36)
MCV RBC AUTO: 93.5 FL — SIGNIFICANT CHANGE UP (ref 80–100)
NRBC # BLD: 0 /100 WBCS — SIGNIFICANT CHANGE UP (ref 0–0)
PLATELET # BLD AUTO: 434 K/UL — HIGH (ref 150–400)
POTASSIUM SERPL-MCNC: 4.2 MMOL/L — SIGNIFICANT CHANGE UP (ref 3.5–5.3)
POTASSIUM SERPL-SCNC: 4.2 MMOL/L — SIGNIFICANT CHANGE UP (ref 3.5–5.3)
RBC # BLD: 4.3 M/UL — SIGNIFICANT CHANGE UP (ref 4.2–5.8)
RBC # FLD: 13.2 % — SIGNIFICANT CHANGE UP (ref 10.3–14.5)
SODIUM SERPL-SCNC: 139 MMOL/L — SIGNIFICANT CHANGE UP (ref 135–145)
WBC # BLD: 11.31 K/UL — HIGH (ref 3.8–10.5)
WBC # FLD AUTO: 11.31 K/UL — HIGH (ref 3.8–10.5)

## 2024-04-17 PROCEDURE — 99233 SBSQ HOSP IP/OBS HIGH 50: CPT

## 2024-04-17 RX ORDER — LOSARTAN POTASSIUM 100 MG/1
25 TABLET, FILM COATED ORAL DAILY
Refills: 0 | Status: DISCONTINUED | OUTPATIENT
Start: 2024-04-17 | End: 2024-04-25

## 2024-04-17 RX ADMIN — ENOXAPARIN SODIUM 40 MILLIGRAM(S): 100 INJECTION SUBCUTANEOUS at 06:04

## 2024-04-17 RX ADMIN — Medication 650 MILLIGRAM(S): at 13:12

## 2024-04-17 RX ADMIN — AMPICILLIN SODIUM AND SULBACTAM SODIUM 200 GRAM(S): 250; 125 INJECTION, POWDER, FOR SUSPENSION INTRAMUSCULAR; INTRAVENOUS at 17:50

## 2024-04-17 RX ADMIN — AMPICILLIN SODIUM AND SULBACTAM SODIUM 200 GRAM(S): 250; 125 INJECTION, POWDER, FOR SUSPENSION INTRAMUSCULAR; INTRAVENOUS at 06:04

## 2024-04-17 RX ADMIN — AMPICILLIN SODIUM AND SULBACTAM SODIUM 200 GRAM(S): 250; 125 INJECTION, POWDER, FOR SUSPENSION INTRAMUSCULAR; INTRAVENOUS at 13:07

## 2024-04-17 RX ADMIN — Medication 650 MILLIGRAM(S): at 13:10

## 2024-04-17 NOTE — PROGRESS NOTE ADULT - SUBJECTIVE AND OBJECTIVE BOX
Interval: Patient was seen resting bedside. Continued pain on his left foot. Patient has been unable to walk as he feels unsteady.     Patient is a 73y old  Male who presents with a chief complaint of left foot pain    HPI:73-year-old male with past medical history hyperlipidemia, prediabetes, SLE on hydroxychloroquine presents with left foot pain over the past 4 days.  Patient states he stepped on a stone 4 days ago, states shortly afterwards he start experiencing left foot redness, swelling, pain, fevers, and chills.  Patient report taking ibuprofen with little relief.  Patient seen by primary care doctor today, advised to the emergency department further evaluation.  Denies any nasal congestion, cough, chest pain, shortness of breath, abdominal pain, vomiting, numbness, or weakness.  Denies any additional complaints.      Podiatry HPI: 73-year-old male with past medical history hyperlipidemia, prediabetes, SLE on hydroxychloroquine presents with left foot pain over the past 4 days. Patient states that one monday he was walking and stepped on a stone. Patient states later that day he began to develop pain and fevers and chills. Patient states that this entire week his foot has become increasingly swollen and he has been unable to walk. Patient states he has had fevers and chills all week long. Patient went to his PCP who gave him pain meds and recommened he come to ED for further workup.       Medications acetaminophen     Tablet .. 650 milliGRAM(s) Oral every 6 hours PRN  ampicillin/sulbactam  IVPB      ampicillin/sulbactam  IVPB 3 Gram(s) IV Intermittent every 6 hours  enoxaparin Injectable 40 milliGRAM(s) SubCutaneous every 24 hours  lactated ringers. 1000 milliLiter(s) IV Continuous <Continuous>  losartan 25 milliGRAM(s) Oral daily  ondansetron Injectable 4 milliGRAM(s) IV Push every 8 hours PRN    FH,   PMHOsteoarthritis    Osteoarthritis of left knee    High cholesterol       PSHNo significant past surgical history    H/O foot surgery        Labs                          12.9   11.31 )-----------( 434      ( 17 Apr 2024 07:08 )             40.2      04-17    139  |  107  |  22<H>  ----------------------------<  115<H>  4.2   |  24  |  0.77    Ca    8.8      17 Apr 2024 07:08  Phos  3.8     04-16  Mg     2.5     04-16    TPro  7.4  /  Alb  3.0<L>  /  TBili  0.5  /  DBili  x   /  AST  144<H>  /  ALT  249<H>  /  AlkPhos  249<H>  04-16     Vital Signs Last 24 Hrs  T(C): 36.6 (17 Apr 2024 12:34), Max: 37 (17 Apr 2024 05:22)  T(F): 97.9 (17 Apr 2024 12:34), Max: 98.6 (17 Apr 2024 05:22)  HR: 78 (17 Apr 2024 12:34) (72 - 81)  BP: 137/74 (17 Apr 2024 12:34) (122/65 - 137/74)  BP(mean): --  RR: 16 (17 Apr 2024 12:34) (16 - 17)  SpO2: 96% (17 Apr 2024 12:34) (96% - 97%)    Parameters below as of 17 Apr 2024 12:34  Patient On (Oxygen Delivery Method): room air      Sedimentation Rate, Erythrocyte: 86 mm/Hr (04-11-24 @ 14:45)         C-Reactive Protein, Serum: 106 mg/L (04-14-24 @ 05:41)  C-Reactive Protein, Serum: 279 mg/L (04-11-24 @ 14:45)   WBC Count: 11.31 K/uL *H* (04-17-24 @ 07:08)        PHYSICAL EXAM  LE Focused:    Vasc: DP & PT palpable right foot. Left foot PT palpable, DP non palpable 2/2 edema. CFT <3 seconds to all 10 digits b/l. TG warm to warm left foot.   Derm: Left foot increased ecchymosis noted to the dorsal aspect of the foot. Erythema, and edema noted to the left foot. No signs of any open lesions noted. Increased temperature noted to the dorsum of the left foot. No fluctuance noted over the dorsal aspect. No soft tissue crepitus noted. combined total of 12 cc or purulent drainage has been obtained from dorsal foot wound. Further purlent drainage was expressed from the wound 4/14 approx 2 cc.   Neuro: Gross and light touch sensation intact b/l  MSK: PTP to the left foot       IMAGING: ?xray    < from: US Duplex Venous Lower Ext Ltd, Left (04.11.24 @ 15:49) >  PROCEDURE DATE:  04/11/2024          INTERPRETATION:  CLINICAL INFORMATION: Left foot pain    COMPARISON: None available.    TECHNIQUE: Duplex sonography of the LEFT LOWER extremity veins with color   and spectral Doppler, with and without compression.    FINDINGS:    There is normal compressibility of the left common femoral, femoral and   popliteal veins.  The contralateral common femoral vein is patent.  Doppler examination shows normal spontaneous and phasic flow.    No calf vein thrombosis is detected.    IMPRESSION:  No evidence of left lower extremity deep venous thrombosis.            < end of copied text >  < from: Xray Foot AP + Lateral + Oblique, Left (04.11.24 @ 15:29) >      < end of copied text >  < from: Xray Foot AP + Lateral + Oblique, Left (04.11.24 @ 15:29) >      INTERPRETATION:  Bilateral ankles and left foot. Patient has local pain   and swelling.    COMPARISON: None available.    Left ankle. 3 views.    Arterial calcifications are noted.    There are some calcifications starting posterior to the posterior   calcaneus proceeding superiorly. There is mild degeneration of the   malleolar tips. No fracture.    Right ankle. 3 views.    There has been talar calcaneal fusion and also fusion of the lower fibula   with these 2 bones. Orthopedic hardware is seen over the lower medial   tibial area.    There is an inferior calcaneal spur.    No bone destruction or acute fracture.    Left foot. 3 views.    There is mild first MTP degeneration and mild swelling of the dorsum of   the foot. No fracture.    IMPRESSION: No acute fracture. Mild swelling dorsum left foot. Old fusion   of the right ankle joint with some orthopedic hardware.    --- End of Report ---    < end of copied text >      < from: CT Foot w/ IV Cont, Left (04.11.24 @ 19:27) >    INTERPRETATION:  Exam Type: CT FOOT WITH IV CONTRAST LEFT  Exam Date and Time: 4/11/2024 7:27 PM  Indication: Left foot pain. Evaluate forabscess.  Comparison: Same-day foot and ankle x-rays.    TECHNIQUE:  Multiplanar CT images of the left foot were obtained after administration   of 90 cc of Omnipaque 350 (10 cc discarded).    FINDINGS:  Evaluation of the left foot demonstrate juxtaarticular erosive changes at   the tibiotalar joint asymmetric to the distal lateral margin of the   tibia. Extensive mineralization is identified within the tibiotalar   joint. Similar findings of mineralization is seen about the first MTP   joint as well as at some of the TMT articulations. However, discrete   osseous erosions are not identified at these locations. There is no   osseous destruction or aggressive periosteal reaction identified. No   fracture or dislocation.    There is degenerative arthrosis involving the naviculocuneiform   articulation. No advanced productive changes are seen across the Lisfranc   interval.    Within the soft tissues, there is diffuse swelling identified along the   dorsal foot. There is fluid about the first TMT joint which decompresses   along the dorsal surface of the midfoot/forefoot. This fluid collection   along the dorsal midfoot/forefoot measures approximately 2.5 cm in the AP   dimension and approximately 2.9 cm in the transverse dimension. There is   peripheral enhancement with lack of central enhancement.    Mineralization is seen involving the distal insertional fibers of the   Achilles tendon as well as within the posterior tibialis tendon.   Otherwise, the imaged tendons are grossly intact.      IMPRESSION:  1.  Findings of gouty arthropathy about the foot as described. No CT   findings to suggest osteomyelitis.  2.  Peripheral enhancing fluid collection seen about the dorsal foot   about the first TMT joint as described is also felt to related to the   gouty arthropathy. An abscess is a less likely consideration but cannot   be entirely excluded on this examination.    < end of copied text >      CULTURES:

## 2024-04-17 NOTE — DISCHARGE NOTE PROVIDER - NSDCCPCAREPLAN_GEN_ALL_CORE_FT
PRINCIPAL DISCHARGE DIAGNOSIS  Diagnosis: Cellulitis  Assessment and Plan of Treatment: You were treated with IV antibiotics of cellulitis. Cellulitis is a skin infection caused by bacteria. Take all of your antibiotics as ordered.  Call your Health Care Provider within two days of arriving home to make a follow up appointment within one week.  If the affected cellulitic area increases in redness, warmth, pain or swelling call your Health Care Provider.  If you develop fever, chills, and/or malaise, call your Health Care Provider.  Self-care: Wash the area with soap and water every day. Gently pat dry. Elevate the area above the level of your heart as often as you can. This will help decrease swelling and pain.   Prevent cellulitis: Do not scratch bug bites or areas of injury. You increase your risk for cellulitis by scratching these areas.  Wash your hands often. Use soap and water. Use lotion to prevent dry, cracked skin.  Follow up with your doctor        SECONDARY DISCHARGE DIAGNOSES  Diagnosis: HTN (hypertension)  Assessment and Plan of Treatment: You have a history of high blood pressure. High blood pressure is a condition that puts you at risk for heart attack, stroke and kidney disease. Please continue to take your medications as prescribed. You can also help control your blood pressure by maintaining a healthy weight, eating a diet low in fat and rich in fruits and vegetables, reduce the amount of salt in your diet. Also, reduce alcohol and try to include some form of physical activity daily for at least 30 mins. Follow up with your medical doctor to establish long term blood pressure treatment goals.  Notify your doctor if you have any of the following symptoms:   Dizziness, Lightheadedness, Blurry vision, Headache, Chest pain, Shortness of breath       PRINCIPAL DISCHARGE DIAGNOSIS  Diagnosis: Cellulitis  Assessment and Plan of Treatment: You were treated with IV antibiotics of cellulitis. Cellulitis is a skin infection caused by bacteria. Take all of your antibiotics as ordered.   WCO: Betadine, 4x4 gauze, abd, ,jorge ace bandage to be changed every other day   Call your Health Care Provider within two days of arriving home to make a follow up appointment within one week.  If the affected cellulitic area increases in redness, warmth, pain or swelling call your Health Care Provider.  If you develop fever, chills, and/or malaise, call your Health Care Provider.  Self-care: Wash the area with soap and water every day. Gently pat dry. Elevate the area above the level of your heart as often as you can. This will help decrease swelling and pain.   Prevent cellulitis: Do not scratch bug bites or areas of injury. You increase your risk for cellulitis by scratching these areas.  Wash your hands often. Use soap and water. Use lotion to prevent dry, cracked skin.  Follow up with your doctor        SECONDARY DISCHARGE DIAGNOSES  Diagnosis: HTN (hypertension)  Assessment and Plan of Treatment: You have a history of high blood pressure. High blood pressure is a condition that puts you at risk for heart attack, stroke and kidney disease. Please continue to take your medications as prescribed. You can also help control your blood pressure by maintaining a healthy weight, eating a diet low in fat and rich in fruits and vegetables, reduce the amount of salt in your diet. Also, reduce alcohol and try to include some form of physical activity daily for at least 30 mins. Follow up with your medical doctor to establish long term blood pressure treatment goals.  Notify your doctor if you have any of the following symptoms:   Dizziness, Lightheadedness, Blurry vision, Headache, Chest pain, Shortness of breath       PRINCIPAL DISCHARGE DIAGNOSIS  Diagnosis: Cellulitis  Assessment and Plan of Treatment: You presenetd to the hospital for foot pain, you were followed by podiatry and an infectious disease doctor. You were treated with IV antibiotics of cellulitis. Cellulitis is a skin infection caused by bacteria. While in the hospital you had an Incision & Drainage of an abscess in your foot seen on MRI. Your wound was closed by podiatry and you are stable for discharge.  Take all of your antibiotics as ordered. ( Augmentin x 7days )   Call your Health Care Provider within two days of arriving home to make a follow up appointment within one week.  If the affected cellulitic area increases in redness, warmth, pain or swelling call your Health Care Provider.  If you develop fever, chills, and/or malaise, call your Health Care Provider.  Self-care: Wash the area with soap and water every day. Gently pat dry. Elevate the area above the level of your heart as often as you can. This will help decrease swelling and pain.   Prevent cellulitis: Do not scratch bug bites or areas of injury. You increase your risk for cellulitis by scratching these areas.  Wash your hands often. Use soap and water. Use lotion to prevent dry, cracked skin.  Follow up with your doctor        SECONDARY DISCHARGE DIAGNOSES  Diagnosis: H/O drainage of abscess  Assessment and Plan of Treatment: Please follow the plan of care above.    Diagnosis: Transaminitis  Assessment and Plan of Treatment: You were found to have transaminitis. Transaminitis means you have high levels of certain liver enzymes in your blood. These enzymes are released from liver cells when something injures those cells such as alcohol, medications, viruses and metabolic diseases are among the possible causes of liver damage and transaminitis. while in the hospital your liver enzymes were monitored and are improving.  Please follow- up outpt for futher work- up      Diagnosis: Systemic lupus erythematosus  Assessment and Plan of Treatment: You have a history of lupus, take your medications as prescribed and follow- up outpatient with your rheumatologist for further management.    Diagnosis: Gouty arthritis  Assessment and Plan of Treatment: Continue with medication as prescribed.  Follow-up with your primary care physician .  Call your physician if you develop pain not relieved with pain regimen, fever and or swelling/redness in your extremity (ies).      Diagnosis: HLD (hyperlipidemia)  Assessment and Plan of Treatment: You have a history of hyperlipidemia, which is when you have too much cholesterol in your blood. High amounts of cholesterol in your blood can put you at higher risks for heart attck, strokes and other health problems. Follow up with PCP for treatment goals, continue medication as prescribed, have liver function testing every 3 months as anti lipid medications can cause liver irritation, eat low fat meals, avoid red meat, butter, fried foods and cheese. Get daily exercise.      Diagnosis: HTN (hypertension)  Assessment and Plan of Treatment: You have a history of high blood pressure. High blood pressure is a condition that puts you at risk for heart attack, stroke and kidney disease. Please continue to take your medications as prescribed. You can also help control your blood pressure by maintaining a healthy weight, eating a diet low in fat and rich in fruits and vegetables, reduce the amount of salt in your diet. Also, reduce alcohol and try to include some form of physical activity daily for at least 30 mins. Follow up with your medical doctor to establish long term blood pressure treatment goals.  Notify your doctor if you have any of the following symptoms:   Dizziness, Lightheadedness, Blurry vision, Headache, Chest pain, Shortness of breath      Diagnosis: Renal cyst  Assessment and Plan of Treatment: You were found to have a renal cyst on abdominal ultrasound, please follow- up outpatient for further management.     PRINCIPAL DISCHARGE DIAGNOSIS  Diagnosis: Cellulitis  Assessment and Plan of Treatment: You presenetd to the hospital for foot pain, you were followed by podiatry and an infectious disease doctor. You were treated with IV antibiotics of cellulitis. Cellulitis is a skin infection caused by bacteria. While in the hospital you had an Incision & Drainage of an abscess in your foot seen on MRI. Your wound was closed by podiatry and you are stable for discharge.  Take all of your antibiotics as ordered. ( Augmentin x 6days )   Call your Health Care Provider within two days of arriving home to make a follow up appointment within one week.  If the affected cellulitic area increases in redness, warmth, pain or swelling call your Health Care Provider.  If you develop fever, chills, and/or malaise, call your Health Care Provider.  Self-care: Wash the area with soap and water every day. Gently pat dry. Elevate the area above the level of your heart as often as you can. This will help decrease swelling and pain.   Prevent cellulitis: Do not scratch bug bites or areas of injury. You increase your risk for cellulitis by scratching these areas.  Wash your hands often. Use soap and water. Use lotion to prevent dry, cracked skin.  Follow up with your doctor  WOUND CARE INSTRUCTION:  -APPLY BETADINE, 4X4  GAUZE, ABDOMINAL PAD, KHADRA, ACE BANDAGE  -DRESSING TO BE CHANGED EVERY OTHER DAY  -WEIGHT BEARING AS TOLERATED        SECONDARY DISCHARGE DIAGNOSES  Diagnosis: HTN (hypertension)  Assessment and Plan of Treatment: You have a history of high blood pressure. High blood pressure is a condition that puts you at risk for heart attack, stroke and kidney disease. Please continue to take your medications as prescribed. You can also help control your blood pressure by maintaining a healthy weight, eating a diet low in fat and rich in fruits and vegetables, reduce the amount of salt in your diet. Also, reduce alcohol and try to include some form of physical activity daily for at least 30 mins. Follow up with your medical doctor to establish long term blood pressure treatment goals.  Notify your doctor if you have any of the following symptoms:   Dizziness, Lightheadedness, Blurry vision, Headache, Chest pain, Shortness of breath      Diagnosis: Transaminitis  Assessment and Plan of Treatment: You were found to have transaminitis. Transaminitis means you have high levels of certain liver enzymes in your blood. These enzymes are released from liver cells when something injures those cells such as alcohol, medications, viruses and metabolic diseases are among the possible causes of liver damage and transaminitis. while in the hospital your liver enzymes were monitored and are improving.  Please follow- up outpt for futher work- up      Diagnosis: Systemic lupus erythematosus  Assessment and Plan of Treatment: You have a history of lupus, take your medications as prescribed and follow- up outpatient with your rheumatologist for further management.    Diagnosis: Gouty arthritis  Assessment and Plan of Treatment: Continue with medication as prescribed.  Follow-up with your primary care physician .  Call your physician if you develop pain not relieved with pain regimen, fever and or swelling/redness in your extremity (ies).      Diagnosis: HLD (hyperlipidemia)  Assessment and Plan of Treatment: You have a history of hyperlipidemia, which is when you have too much cholesterol in your blood. High amounts of cholesterol in your blood can put you at higher risks for heart attck, strokes and other health problems. Follow up with PCP for treatment goals, continue medication as prescribed, have liver function testing every 3 months as anti lipid medications can cause liver irritation, eat low fat meals, avoid red meat, butter, fried foods and cheese. Get daily exercise.      Diagnosis: H/O drainage of abscess  Assessment and Plan of Treatment: Please follow the plan of care above.    Diagnosis: Renal cyst  Assessment and Plan of Treatment: You were found to have a renal cyst on abdominal ultrasound, please follow- up outpatient for further management.

## 2024-04-17 NOTE — PROGRESS NOTE ADULT - SUBJECTIVE AND OBJECTIVE BOX
Patient is a 73y old  Male who presents with a chief complaint of Cellulitis     (16 Apr 2024 15:10)      INTERVAL HPI/OVERNIGHT EVENTS: no overnight events    I&O's Summary    Vital Signs Last 24 Hrs  T(C): 36.6 (17 Apr 2024 12:34), Max: 37 (17 Apr 2024 05:22)  T(F): 97.9 (17 Apr 2024 12:34), Max: 98.6 (17 Apr 2024 05:22)  HR: 78 (17 Apr 2024 12:34) (72 - 81)  BP: 137/74 (17 Apr 2024 12:34) (122/65 - 137/74)  BP(mean): --  RR: 16 (17 Apr 2024 12:34) (16 - 17)  SpO2: 96% (17 Apr 2024 12:34) (96% - 97%)    Parameters below as of 17 Apr 2024 12:34  Patient On (Oxygen Delivery Method): room air      PAST MEDICAL & SURGICAL HISTORY:  Osteoarthritis of left knee      High cholesterol      H/O foot surgery  Right          SOCIAL HISTORY  Alcohol:  Tobacco:  Illicit substance use:      FAMILY HISTORY:      LABS:                        12.9   11.31 )-----------( 434      ( 17 Apr 2024 07:08 )             40.2     04-17    139  |  107  |  22<H>  ----------------------------<  115<H>  4.2   |  24  |  0.77    Ca    8.8      17 Apr 2024 07:08  Phos  3.8     04-16  Mg     2.5     04-16    TPro  7.4  /  Alb  3.0<L>  /  TBili  0.5  /  DBili  x   /  AST  144<H>  /  ALT  249<H>  /  AlkPhos  249<H>  04-16      Urinalysis Basic - ( 17 Apr 2024 07:08 )    Color: x / Appearance: x / SG: x / pH: x  Gluc: 115 mg/dL / Ketone: x  / Bili: x / Urobili: x   Blood: x / Protein: x / Nitrite: x   Leuk Esterase: x / RBC: x / WBC x   Sq Epi: x / Non Sq Epi: x / Bacteria: x      CAPILLARY BLOOD GLUCOSE      Urinalysis Basic - ( 17 Apr 2024 07:08 )    Color: x / Appearance: x / SG: x / pH: x  Gluc: 115 mg/dL / Ketone: x  / Bili: x / Urobili: x   Blood: x / Protein: x / Nitrite: x   Leuk Esterase: x / RBC: x / WBC x   Sq Epi: x / Non Sq Epi: x / Bacteria: x        MEDICATIONS  (STANDING):  ampicillin/sulbactam  IVPB 3 Gram(s) IV Intermittent every 6 hours  ampicillin/sulbactam  IVPB      lactated ringers. 1000 milliLiter(s) (75 mL/Hr) IV Continuous <Continuous>  losartan 25 milliGRAM(s) Oral daily    MEDICATIONS  (PRN):  acetaminophen     Tablet .. 650 milliGRAM(s) Oral every 6 hours PRN Temp greater or equal to 38C (100.4F), Mild Pain (1 - 3)  ondansetron Injectable 4 milliGRAM(s) IV Push every 8 hours PRN Nausea and/or Vomiting      REVIEW OF SYSTEMS:  CONSTITUTIONAL: No fever  EYES: No eye pain, visual disturbances  ENMT:   No sinus or throat pain  NECK: No pain  RESPIRATORY: No cough, wheezing; No shortness of breath  CARDIOVASCULAR: No chest pain, palpitations, dizziness  GASTROINTESTINAL: No abdominal pain. No nausea, vomiting, No diarrhea or constipation.   GENITOURINARY: No dysuria  NEUROLOGICAL: No headaches  SKIN: No rashes  MUSCULOSKELETAL: + left great toe pain       RADIOLOGY & ADDITIONAL TESTS:  < from:  Duplex Venous Lower Ext Ltd, Left (04.11.24 @ 15:49) >    ACC: [id number] EXAM:  US DPLX LWR EXT VEINS LTD LT   ORDERED BY: ZAID CURRY     PROCEDURE DATE:  04/11/2024          INTERPRETATION:  CLINICAL INFORMATION: Left foot pain    COMPARISON: None available.    TECHNIQUE: Duplex sonography of the LEFT LOWER extremity veins with color   and spectral Doppler, with and without compression.    FINDINGS:    There is normal compressibility of the left common femoral, femoral and   popliteal veins.  The contralateral common femoral vein is patent.  Doppler examination shows normal spontaneous and phasic flow.    No calf vein thrombosis is detected.    IMPRESSION:  No evidence of left lower extremity deep venous thrombosis.    < end of copied text >    ACC: [id number] EXAM:  CT FOOT ONLY IC LT   ORDERED BY: UZIEL FERGUSON     PROCEDURE DATE:  04/11/2024          INTERPRETATION:  Exam Type: CT FOOT WITH IV CONTRAST LEFT  Exam Date and Time: 4/11/2024 7:27 PM  Indication: Left foot pain. Evaluate forabscess.  Comparison: Same-day foot and ankle x-rays.    TECHNIQUE:  Multiplanar CT images of the left foot were obtained after administration   of 90 cc of Omnipaque 350 (10 cc discarded).    FINDINGS:  Evaluation of the left foot demonstrate juxtaarticular erosive changes at   the tibiotalar joint asymmetric to the distal lateral margin of the   tibia. Extensive mineralization is identified within the tibiotalar   joint. Similar findings of mineralization is seen about the first MTP   joint as well as at some of the TMT articulations. However, discrete   osseous erosions are not identified at these locations. There is no   osseous destruction or aggressive periosteal reaction identified. No   fracture or dislocation.    There is degenerative arthrosis involving the naviculocuneiform   articulation. No advanced productive changes are seen across the Lisfranc   interval.    Within the soft tissues, there is diffuse swelling identified along the   dorsal foot. There is fluid about the first TMT joint which decompresses   along the dorsal surface of the midfoot/forefoot. This fluid collection   along the dorsal midfoot/forefoot measures approximately 2.5 cm in the AP   dimension and approximately 2.9 cm in the transverse dimension. There is   peripheral enhancement with lack of central enhancement.    Mineralization is seen involving the distal insertional fibers of the   Achilles tendon as well as within the posterior tibialis tendon.   Otherwise, the imaged tendons are grossly intact.      IMPRESSION:  1.  Findings of gouty arthropathy about the foot as described. No CT   findings to suggest osteomyelitis.  2.  Peripheral enhancing fluid collection seen about the dorsal foot   about the first TMT joint as described is also felt to related to the   gouty arthropathy. An abscess is a less likely consideration but cannot   be entirely excluded on this examination.    --- End of Report ---    ACC: [id number] EXAM:  US ABDOMEN RT UPR QUADRANT   ORDERED BY: REYNA SORTO     PROCEDURE DATE:  04/16/2024          INTERPRETATION:  CLINICAL INFORMATION: Right upper quadrant transaminitis.    COMPARISON: None available.    TECHNIQUE: Sonography of the right upper quadrant.    FINDINGS:  Liver: Within normal limits.  Bile ducts: Normal caliber. Common bile duct measures 6 mm in caliber   which is within normal limits.  Gallbladder: Within normal limits.  Pancreas: Visualized portions are within normal limits.  Right kidney: 11.7 cm. No hydronephrosis. 6.5 x 7.6 x 6.9 cm.  Ascites: None.  IVC: Visualized portions are within normal limits.    IMPRESSION:  7.6 cm right renal cyst; otherwise, unremarkable right upper quadrant   abdominal ultrasound.    --- End of Report ---          ACC: [id number] EXAM:  MR GREEN LT   ORDERED BY: REYNA SORTO     PROCEDURE DATE:  04/16/2024          INTERPRETATION:  Exam Type: MR FOOT LEFT  Exam Date and Time: 4/16/2024 4:43 PM  Indication: Infection  Comparison: Left foot CT on 4/11/2024    TECHNIQUE:  Multiplanar multisequence MRI of the left hindfoot was performed.    FINDINGS:  BONES/JOINTS:  Again seen is juxtaarticular erosive changes at the tibiofibular   articulation most significant at the lateral aspect of the tibia.   Moderate osseous edema of the first and second tarsometatarsal joints   with mild erosive change along the lateral base of the first metatarsal   and medial aspect of the middle cuneiform. Cystic changes along the   medial aspect of the calcaneus are again visualized. No fracture.   Moderate osteoarthritis of the talonavicular articulation as well as   tarsometatarsal joints. Well-corticated ossific fragment distal to medial   malleolus from prior injury. Ankle mortise is intact. Small moderate   tibiotalar and subtalar joint effusions.    LATERAL LIGAMENTS:  Anterior talofibular ligament (ATFL): Thickened from chronic sprain.  Calcaneofibular ligament (CFL): Chronically sprained.  Posterior talofibular ligament (PTFL): Chronically sprained.  Tibiofibular syndesmosis: Heterogeneity spanning the anterior and   posterior syndesmotic ligaments which are not well identified.    LATERAL (PERONEAL) TENDONS:  Peroneus longus: No tendinosis or tear. No tenosynovitis.  Peroneus brevis: No tendinosis or tear. No tenosynovitis.    MEDIAL LIGAMENTS:  Deltoid ligament: Chronically sprained.  Spring ligament: Normal.    MEDIAL (FLEXOR) TENDONS:  Posterior tibialis: Moderate posterior tibialis tendinosis. Mild   tenosynovitis.  Flexor digitorum longus: No tendinosis or tear. Mild tenosynovitis.  Flexor hallucis longus: No tendinosis or tear. Mild tenosynovitis.    ANTERIOR (EXTENSOR) TENDONS:  Anterior tibialis: No tendinosis or tear. No tenosynovitis.  Extensor hallucis longus: No tendinosis or tear. No tenosynovitis.  Extensor digitorum longus: No tendinosis or tear. No tenosynovitis.    LISFRANC LIGAMENT: Marked edema at the Lisfranc interval. The Lisfranc   ligament appears grossly intact.    ACHILLES TENDON: Moderate distal Achilles tendinosis with questionable   low-grade interstitial tearing at the insertion.    PLANTAR FASCIA: Chronic thickening with minimal perifascial edema along   the medial band.    OTHER POSTERIOR/SOFT TISSUE STRUCTURES:  Sinus tarsi: Mild infiltration of the normalsinus tarsi fat.  Tarsal tunnel: Unimpinged; no mass effect.  Muscles: Edema within the partially visualized muscles of the forefoot.  Soft tissues: Diffuse soft tissue swelling at the dorsum of the foot.   Fluid surrounding the first tarsometatarsaljoint decompresses along the   dorsal surface of the midfoot/forefoot.    IMPRESSION:  1.  Again seen are findings of gouty arthropathy about the hindfoot. The   again seen fluid collection surrounding the first tarsometatarsal joint   and extending into the dorsal aspect of the foot is not well-visualized   and likely related to the gouty arthropathy given adjacent erosive   changes. However, underlying abscess infectious etiology cannot be   excluded. Clinical correlation is advised.  2.  Moderate distal Achilles tendinosis with questionable low-grade   interstitial tearing at the insertion.  3.  Moderate posterior tibialis tendinosis. Mild tenosynovitis.  4.  Chronic plantar fasciitis.    --- End of Report ---    Imaging Personally Reviewed:  [x ] YES  [ ] NO    Consultant(s) Notes Reviewed:  [x ] YES  [ ] NO    PHYSICAL EXAM:  GENERAL: NAD  HEAD:  Atraumatic, Normocephalic  EYES:  conjunctiva and sclera clear  ENMT:  Moist mucous membranes  NECK: Supple  NERVOUS SYSTEM:  Alert & Oriented X3, Good concentration  CHEST/LUNG: CTA bilaterally; No rales, rhonchi, wheezing  HEART: Regular rate and rhythm  ABDOMEN: Soft, Nontender, Nondistended; Bowel sounds present  EXTREMITIES:  No clubbing, cyanosis, or edema  SKIN: No rashes     Care Collaborated Discussed with Consultants/Other Providers [x ] YES  [ ] NO

## 2024-04-17 NOTE — DISCHARGE NOTE PROVIDER - NSDCMRMEDTOKEN_GEN_ALL_CORE_FT
HYDROXYCHLOR 200MG TAB:   LOSARTAN 25MG TAB:    amoxicillin-clavulanate 875 mg-125 mg oral tablet: 1 tab(s) orally 2 times a day  HYDROXYCHLOR 200MG TAB:   LOSARTAN 25MG TAB:

## 2024-04-17 NOTE — PROGRESS NOTE ADULT - PROBLEM SELECTOR PLAN 4
holding BP meds in the setting of ? sepsis   Monitor BP  DASH/TLC diet  resume home meds when indicated hold hydroxychloroquine inpt

## 2024-04-17 NOTE — DISCHARGE NOTE PROVIDER - PROVIDER TOKENS
FREE:[LAST:[Osman],FIRST:[Alvaro],PHONE:[phone]],FAX:[(   [phone]    -],ADDRESS:[[address] [place] [address]],FOLLOWUP:[Routine],ESTABLISHEDPATIENT:[T]]

## 2024-04-17 NOTE — DISCHARGE NOTE PROVIDER - ATTENDING DISCHARGE PHYSICAL EXAMINATION:
GENERAL: NAD  HEENT: Normocephalic;  conjunctivae and sclerae clear; moist mucous membranes;   NECK : supple  CHEST/LUNG: Clear to auscultation bilaterally with good air entry   HEART: S1 S2  regular; no murmurs, gallops or rubs  ABDOMEN: Soft, Nontender, Nondistended; Bowel sounds present  EXTREMITIES: Left foot drsg C/D/I, no cyanosis; no edema; no calf tenderness  SKIN: warm and dry; no rash  NERVOUS SYSTEM:  Awake and alert; Oriented  to place, person and time ; no new deficits

## 2024-04-17 NOTE — DISCHARGE NOTE PROVIDER - HOSPITAL COURSE
a 73 year old Male from home with PMHx of HLD, prediabetes, SLE on hydroxychloroquine presents with left foot pain over the past 4 days. Admitted to medicine for L foot cellulitis found. Blood culture positive likely contaminate.    ID Dr. Downey and podiatry consulted. Doppler negative for DVT. CT left foot no OM, possible abscess. Podiatry consulted.         incomplete 4/17--->> a 73 year old Male from home with PMHx of HLD, prediabetes, SLE on hydroxychloroquine presents with left foot pain over the past 4 days. Admitted to medicine for L foot cellulitis found. Blood culture positive likely contaminate.    ID Dr. Downey and podiatry consulted. Doppler negative for DVT. CT left foot no OM, possible abscess. Podiatry consulted.           WCO: Betadine, 4x4 gauze, abd, ,jorge ace bandage to be changed every other day         incomplete 4/17--->> 73 year old Male from home with PMHx of HLD, prediabetes, SLE on hydroxychloroquine presents with left foot pain over the past 4 days. Admitted to medicine for L foot cellulitis with abscess. ID and podiatry consulted. Pain consulted.   Pt underwent I &D on 4/18.  Incision site left open with packing to allow for drainage per podiatry.   Podiatry reccs to keep NONWB until  4/20 , following 4/20 Patient can be heel weight bearing left foot as tolerated.   post op PT eval -home PT.   4/24-Underwent bedside wound closure by podiatry.  Pt. is stable for discharge from podiatry point.  PT recc home PT.  ID recc Augmentin x 7 days.    case discussed with attending, pt medically stable for d/c. Please note that this a brief summary of hospital course please refer to daily progress notes and consult notes for full course and events

## 2024-04-17 NOTE — PROGRESS NOTE ADULT - ASSESSMENT
A:  Left foot infected foot    P:   Patient evaluated and Chart reviewed   Discussed diagnosis and treatment with patient  X-rays evaluated, results as noted above  CT results reviewed   Continue with IV antibiotics As Per ID  Reviewed MRI, see above noted  4-5cc of purulent drainage expressed from the wound today. Patient scheduled for a left foot incision and drainage tomorrow.   Patient to be NPO midnight tonight   Requesting medical clearance   Please hold all anti coags   Ordered T &S  Wound was dressed with betadine 4x4, ABD, kerlix, ace bandage.    Weight bearing as tolerated left foot   Podiatry to follow while in house.    Seen discussed and reviewed with Dr. Gonzalez  Discussed with Attending Dr. Valencia     WCO: Betadine, 4x4 gauze, abd, ,jorge ace bandage to be changed every other day

## 2024-04-17 NOTE — PROGRESS NOTE ADULT - PROBLEM SELECTOR PLAN 2
LFT trending up  Abdominal US unremarkable  outpatient follow up for further medical management bcx 4/11- staph epi mrsa, staph hommis ( likely contaminated)  bcx 4/13- NGTD    ID Nasreen follows

## 2024-04-17 NOTE — PROGRESS NOTE ADULT - ATTENDING COMMENTS
Seen at bedside. Dorsal wound still with purulence expressed today.  Plan for OR for I+D with washout.  Please medically optimize for OR

## 2024-04-17 NOTE — PROGRESS NOTE ADULT - PROBLEM SELECTOR PLAN 3
hold hydroxychloroquine LFT trending up  Abdominal US unremarkable  CMP in AM  outpatient follow up for further medical management

## 2024-04-17 NOTE — DISCHARGE NOTE PROVIDER - CARE PROVIDER_API CALL
Alvaro Brewer  [address] [place] [place] [address]  Phone: [phone]  Fax: (   )    -  Established Patient  Follow Up Time: Routine

## 2024-04-17 NOTE — PROGRESS NOTE ADULT - ASSESSMENT
Patient is a 73 year old Male from home with PMHx of HLD, prediabetes, SLE on hydroxychloroquine presents with left foot pain over the past 4 days. Admitted to medicine for L foot cellulitis found. Blood culture positive likely contaminate.    ID Dr. Downey and podiatry consulted. Doppler negative for DVT. CT left foot no OM, possible abscess. Podiatry consulted.    Patient is a 73 year old Male from home with PMHx of HLD, prediabetes, SLE on hydroxychloroquine presents with left foot pain over the past 4 days. Admitted to medicine for L foot cellulitis, Id and podiatry consulted.

## 2024-04-18 LAB
ALBUMIN SERPL ELPH-MCNC: 3.1 G/DL — LOW (ref 3.5–5)
ALP SERPL-CCNC: 244 U/L — HIGH (ref 40–120)
ALT FLD-CCNC: 162 U/L DA — HIGH (ref 10–60)
ANION GAP SERPL CALC-SCNC: 7 MMOL/L — SIGNIFICANT CHANGE UP (ref 5–17)
APTT BLD: 34.7 SEC — SIGNIFICANT CHANGE UP (ref 24.5–35.6)
AST SERPL-CCNC: 53 U/L — HIGH (ref 10–40)
BILIRUB SERPL-MCNC: 0.4 MG/DL — SIGNIFICANT CHANGE UP (ref 0.2–1.2)
BLD GP AB SCN SERPL QL: SIGNIFICANT CHANGE UP
BUN SERPL-MCNC: 25 MG/DL — HIGH (ref 7–18)
CALCIUM SERPL-MCNC: 9.3 MG/DL — SIGNIFICANT CHANGE UP (ref 8.4–10.5)
CHLORIDE SERPL-SCNC: 110 MMOL/L — HIGH (ref 96–108)
CHOLEST SERPL-MCNC: 195 MG/DL — SIGNIFICANT CHANGE UP
CO2 SERPL-SCNC: 23 MMOL/L — SIGNIFICANT CHANGE UP (ref 22–31)
CREAT SERPL-MCNC: 0.73 MG/DL — SIGNIFICANT CHANGE UP (ref 0.5–1.3)
CRP SERPL-MCNC: 28 MG/L — HIGH
CULTURE RESULTS: ABNORMAL
CULTURE RESULTS: SIGNIFICANT CHANGE UP
CULTURE RESULTS: SIGNIFICANT CHANGE UP
EGFR: 97 ML/MIN/1.73M2 — SIGNIFICANT CHANGE UP
ERYTHROCYTE [SEDIMENTATION RATE] IN BLOOD: 71 MM/HR — HIGH (ref 0–20)
GLUCOSE BLDC GLUCOMTR-MCNC: 93 MG/DL — SIGNIFICANT CHANGE UP (ref 70–99)
GLUCOSE SERPL-MCNC: 118 MG/DL — HIGH (ref 70–99)
HCT VFR BLD CALC: 40.8 % — SIGNIFICANT CHANGE UP (ref 39–50)
HDLC SERPL-MCNC: 27 MG/DL — LOW
HGB BLD-MCNC: 13.4 G/DL — SIGNIFICANT CHANGE UP (ref 13–17)
INR BLD: 1.1 RATIO — SIGNIFICANT CHANGE UP (ref 0.85–1.18)
LIPID PNL WITH DIRECT LDL SERPL: 127 MG/DL — HIGH
MCHC RBC-ENTMCNC: 30.9 PG — SIGNIFICANT CHANGE UP (ref 27–34)
MCHC RBC-ENTMCNC: 32.8 GM/DL — SIGNIFICANT CHANGE UP (ref 32–36)
MCV RBC AUTO: 94.2 FL — SIGNIFICANT CHANGE UP (ref 80–100)
NON HDL CHOLESTEROL: 168 MG/DL — HIGH
NRBC # BLD: 0 /100 WBCS — SIGNIFICANT CHANGE UP (ref 0–0)
PLATELET # BLD AUTO: 451 K/UL — HIGH (ref 150–400)
POTASSIUM SERPL-MCNC: 4.3 MMOL/L — SIGNIFICANT CHANGE UP (ref 3.5–5.3)
POTASSIUM SERPL-SCNC: 4.3 MMOL/L — SIGNIFICANT CHANGE UP (ref 3.5–5.3)
PROT SERPL-MCNC: 7.6 G/DL — SIGNIFICANT CHANGE UP (ref 6–8.3)
PROTHROM AB SERPL-ACNC: 12.5 SEC — SIGNIFICANT CHANGE UP (ref 9.5–13)
RBC # BLD: 4.33 M/UL — SIGNIFICANT CHANGE UP (ref 4.2–5.8)
RBC # FLD: 12.9 % — SIGNIFICANT CHANGE UP (ref 10.3–14.5)
SODIUM SERPL-SCNC: 140 MMOL/L — SIGNIFICANT CHANGE UP (ref 135–145)
SPECIMEN SOURCE: SIGNIFICANT CHANGE UP
TRIGL SERPL-MCNC: 205 MG/DL — HIGH
WBC # BLD: 10.1 K/UL — SIGNIFICANT CHANGE UP (ref 3.8–10.5)
WBC # FLD AUTO: 10.1 K/UL — SIGNIFICANT CHANGE UP (ref 3.8–10.5)

## 2024-04-18 PROCEDURE — 99232 SBSQ HOSP IP/OBS MODERATE 35: CPT

## 2024-04-18 RX ORDER — HYDROMORPHONE HYDROCHLORIDE 2 MG/ML
0.5 INJECTION INTRAMUSCULAR; INTRAVENOUS; SUBCUTANEOUS
Refills: 0 | Status: DISCONTINUED | OUTPATIENT
Start: 2024-04-18 | End: 2024-04-18

## 2024-04-18 RX ORDER — FENTANYL CITRATE 50 UG/ML
25 INJECTION INTRAVENOUS
Refills: 0 | Status: DISCONTINUED | OUTPATIENT
Start: 2024-04-18 | End: 2024-04-18

## 2024-04-18 RX ADMIN — AMPICILLIN SODIUM AND SULBACTAM SODIUM 200 GRAM(S): 250; 125 INJECTION, POWDER, FOR SUSPENSION INTRAMUSCULAR; INTRAVENOUS at 00:01

## 2024-04-18 RX ADMIN — AMPICILLIN SODIUM AND SULBACTAM SODIUM 200 GRAM(S): 250; 125 INJECTION, POWDER, FOR SUSPENSION INTRAMUSCULAR; INTRAVENOUS at 13:24

## 2024-04-18 RX ADMIN — AMPICILLIN SODIUM AND SULBACTAM SODIUM 200 GRAM(S): 250; 125 INJECTION, POWDER, FOR SUSPENSION INTRAMUSCULAR; INTRAVENOUS at 19:27

## 2024-04-18 RX ADMIN — AMPICILLIN SODIUM AND SULBACTAM SODIUM 200 GRAM(S): 250; 125 INJECTION, POWDER, FOR SUSPENSION INTRAMUSCULAR; INTRAVENOUS at 05:59

## 2024-04-18 NOTE — PROGRESS NOTE ADULT - ASSESSMENT
A:  Left foot infected foot    P:   Patient evaluated and Chart reviewed   Discussed diagnosis and treatment with patient  X-rays evaluated, results as noted above  CT results reviewed   Continue with IV antibiotics As Per ID  Reviewed MRI, see above noted  4-5cc of purulent drainage expressed from the wound today. Patient scheduled for a left foot incision and drainage tomorrow.   dressing is left CDI pre-operatively   Scheduled for a left foot incision and drainage today 4/18 with Dr. Valencia. Discussed risks and benefits associated with the procedure.   NPO still in place since midnight last night    Medical clearance appreciated   Written consent signed and witness present  C/w LWC while in house---  Wound was dressed with betadine 4x4, ABD, kerlix, ace bandage.    Weight bearing as tolerated left foot   Podiatry to follow while in house.    Discussed with Attending Dr. Valencia     WCO: Betadine, 4x4 gauze, abd, ,jorge ace bandage to be changed every other day

## 2024-04-18 NOTE — PROGRESS NOTE ADULT - ASSESSMENT
Patient is a 73 year old Male from home with PMHx of HLD, prediabetes, SLE on hydroxychloroquine presents with left foot pain over the past 4 days. Admitted to medicine for L foot cellulitis, Id and podiatry consulted.    4/18: pending I&D by podiatry, pt to start anti inflammatory after procedure, PT reeval after procedure

## 2024-04-18 NOTE — PROGRESS NOTE ADULT - NS ATTEND AMEND GEN_ALL_CORE FT
No new symptoms today, continued pain at first TMA. For OR today for further management of foot abscess    #Sepsis secondary to soft tissue infection  # Left foot cellulitis w/ abscess  # Gram Positive Bacteremia(likely contamination)  #Gouty arthritis  #SLE  #preDM  #HLD  #Preoperative Risk Assessment  Patient with RCRI score of 0: no reported history of MI, CHF, Stroke/TIA, Diabetes requiring insulin, nor elevated creatinine.  He has adequate exercise tolerance with a METS score of at least a 4  There are no active contraindications for surgery at this time and patient is medically optimized.  For OR today with podiatry for I+D  - continue unasyn with plans to change to po Augmentin when ready for discharge  Prediabetes needs more effective lifestyle modifications, AM sugars appears well controlled, continue current insulin regimen   Hydroxychloroquine was discontinued  Uric Acid level controlled  Home losartan resumed  Will start anti-inflammatories after OR procedure by podiatry

## 2024-04-18 NOTE — PROGRESS NOTE ADULT - PROBLEM SELECTOR PLAN 1
- P/W right foot increased ecchymosis on dorsal foot. Erythema, and edema noted to the left foot.   - Xray foot- Mild swelling dorsum left foot. Old fusion of the right ankle joint with some orthopedic hardware.  - MRI left foot- gouty arthropathy about the hindfoot. fluid collection surrounding the first tarsometatarsal joint.     Moderate distal Achilles tendinosis with questionable low-grade interstitial tearing at the insertion. Mild     tenosynovitis. Chronic plantar fasciitis.  - Marlon duplex- Neg for DVT   - c/w unasyn  - I & D 4/18/24  -pain management , podiatry following, DIANDRA Downey

## 2024-04-18 NOTE — PROGRESS NOTE ADULT - SUBJECTIVE AND OBJECTIVE BOX
Patient is a 73y old  Male who presents with a chief complaint of left foot pain (17 Apr 2024 11:51)      OVERNIGHT EVENTS:    REVIEW OF SYSTEMS:  CONSTITUTIONAL: No fever, chills  RESPIRATORY: No cough, SOB  CARDIOVASCULAR: No chest pain, palpitations  GASTROINTESTINAL: No abdominal pain. No nausea, vomiting, or diarrhea  GENITOURINARY: No dysuria  NEUROLOGICAL: No HA  SKIN: No itching, burning, rashes, or lesions       T(C): 36.9 (04-18-24 @ 05:55), Max: 36.9 (04-18-24 @ 05:55)  HR: 74 (04-18-24 @ 05:55) (74 - 74)  BP: 107/69 (04-18-24 @ 05:55) (107/69 - 118/65)  RR: 17 (04-18-24 @ 05:55) (16 - 17)  SpO2: 95% (04-18-24 @ 05:55) (95% - 95%)  Wt(kg): --Vital Signs Last 24 Hrs  T(C): 36.9 (18 Apr 2024 05:55), Max: 36.9 (18 Apr 2024 05:55)  T(F): 98.4 (18 Apr 2024 05:55), Max: 98.4 (18 Apr 2024 05:55)  HR: 74 (18 Apr 2024 05:55) (74 - 74)  BP: 107/69 (18 Apr 2024 05:55) (107/69 - 118/65)  BP(mean): --  RR: 17 (18 Apr 2024 05:55) (16 - 17)  SpO2: 95% (18 Apr 2024 05:55) (95% - 95%)    Parameters below as of 18 Apr 2024 05:55  Patient On (Oxygen Delivery Method): room air          PHYSICAL EXAM:  GENERAL: NAD  CHEST/LUNG: Clear to auscultation bilaterally; No rales, rhonchi, wheezing, or rubs  HEART: S1, S2, Regular rate and rhythm  ABDOMEN: Soft, Nontender, Nondistended; Bowel sounds present  NEURO: Alert & Oriented X3  EXTREMITIES: +left great toe tenderness  LYMPH: No lymphadenopathy noted  SKIN: No rashes or lesions    Consultant(s) Notes Reviewed:  [x ] YES  [ ] NO  Care Discussed with Consultants/Other Providers [ x] YES  [ ] NO  LABS:                        13.4   10.10 )-----------( 451      ( 18 Apr 2024 05:41 )             40.8     04-18    140  |  110<H>  |  25<H>  ----------------------------<  118<H>  4.3   |  23  |  0.73    Ca    9.3      18 Apr 2024 05:41    TPro  7.6  /  Alb  3.1<L>  /  TBili  0.4  /  DBili  x   /  AST  53<H>  /  ALT  162<H>  /  AlkPhos  244<H>  04-18    PT/INR - ( 18 Apr 2024 05:41 )   PT: 12.5 sec;   INR: 1.10 ratio         PTT - ( 18 Apr 2024 05:41 )  PTT:34.7 sec  CAPILLARY BLOOD GLUCOSE          Urinalysis Basic - ( 18 Apr 2024 05:41 )    Color: x / Appearance: x / SG: x / pH: x  Gluc: 118 mg/dL / Ketone: x  / Bili: x / Urobili: x   Blood: x / Protein: x / Nitrite: x   Leuk Esterase: x / RBC: x / WBC x   Sq Epi: x / Non Sq Epi: x / Bacteria: x        RADIOLOGY & ADDITIONAL TESTS:  Imaging Personally Reviewed:  [ ] YES  [ ] NO

## 2024-04-18 NOTE — PROGRESS NOTE ADULT - SUBJECTIVE AND OBJECTIVE BOX
Interval: Patient was seen resting bedside. Patient is scheduled for a left foot incision and drainage today 4/18 with Dr. Valencia. Patient has been NPO since midnight last night. Written consent was obtained with witness present. Patient would like to proceed with the procedure.     Patient is a 73y old  Male who presents with a chief complaint of left foot pain    HPI:73-year-old male with past medical history hyperlipidemia, prediabetes, SLE on hydroxychloroquine presents with left foot pain over the past 4 days.  Patient states he stepped on a stone 4 days ago, states shortly afterwards he start experiencing left foot redness, swelling, pain, fevers, and chills.  Patient report taking ibuprofen with little relief.  Patient seen by primary care doctor today, advised to the emergency department further evaluation.  Denies any nasal congestion, cough, chest pain, shortness of breath, abdominal pain, vomiting, numbness, or weakness.  Denies any additional complaints.      Podiatry HPI: 73-year-old male with past medical history hyperlipidemia, prediabetes, SLE on hydroxychloroquine presents with left foot pain over the past 4 days. Patient states that one monday he was walking and stepped on a stone. Patient states later that day he began to develop pain and fevers and chills. Patient states that this entire week his foot has become increasingly swollen and he has been unable to walk. Patient states he has had fevers and chills all week long. Patient went to his PCP who gave him pain meds and recommened he come to ED for further workup.     Medications acetaminophen     Tablet .. 650 milliGRAM(s) Oral every 6 hours PRN  ampicillin/sulbactam  IVPB      ampicillin/sulbactam  IVPB 3 Gram(s) IV Intermittent every 6 hours  lactated ringers. 1000 milliLiter(s) IV Continuous <Continuous>  losartan 25 milliGRAM(s) Oral daily  ondansetron Injectable 4 milliGRAM(s) IV Push every 8 hours PRN    FH,   PMHOsteoarthritis    Osteoarthritis of left knee    High cholesterol       PSHNo significant past surgical history    H/O foot surgery        Labs                          13.4   10.10 )-----------( 451      ( 18 Apr 2024 05:41 )             40.8      04-18    140  |  110<H>  |  25<H>  ----------------------------<  118<H>  4.3   |  23  |  0.73    Ca    9.3      18 Apr 2024 05:41    TPro  7.6  /  Alb  3.1<L>  /  TBili  0.4  /  DBili  x   /  AST  53<H>  /  ALT  162<H>  /  AlkPhos  244<H>  04-18     Vital Signs Last 24 Hrs  T(C): 36.9 (18 Apr 2024 05:55), Max: 36.9 (18 Apr 2024 05:55)  T(F): 98.4 (18 Apr 2024 05:55), Max: 98.4 (18 Apr 2024 05:55)  HR: 74 (18 Apr 2024 05:55) (74 - 78)  BP: 107/69 (18 Apr 2024 05:55) (107/69 - 137/74)  BP(mean): --  RR: 17 (18 Apr 2024 05:55) (16 - 17)  SpO2: 95% (18 Apr 2024 05:55) (95% - 96%)    Parameters below as of 18 Apr 2024 05:55  Patient On (Oxygen Delivery Method): room air      Sedimentation Rate, Erythrocyte: 71 mm/Hr (04-18-24 @ 05:41)  Sedimentation Rate, Erythrocyte: 86 mm/Hr (04-11-24 @ 14:45)         C-Reactive Protein, Serum: 106 mg/L (04-14-24 @ 05:41)  C-Reactive Protein, Serum: 279 mg/L (04-11-24 @ 14:45)   WBC Count: 10.10 K/uL (04-18-24 @ 05:41)        PHYSICAL EXAM  LE Focused:    Vasc: DP & PT palpable right foot. Left foot PT palpable, DP non palpable 2/2 edema. CFT <3 seconds to all 10 digits b/l. TG warm to warm left foot.   Derm: Left foot increased ecchymosis noted to the dorsal aspect of the foot. Erythema, and edema noted to the left foot. No signs of any open lesions noted. Increased temperature noted to the dorsum of the left foot. No fluctuance noted over the dorsal aspect. No soft tissue crepitus noted. combined total of 12 cc or purulent drainage has been obtained from dorsal foot wound. Further purlent drainage was expressed from the wound 4/14 approx 2 cc.   Neuro: Gross and light touch sensation intact b/l  MSK: PTP to the left foot       IMAGING: ?xray    < from: US Duplex Venous Lower Ext Ltd, Left (04.11.24 @ 15:49) >  PROCEDURE DATE:  04/11/2024          INTERPRETATION:  CLINICAL INFORMATION: Left foot pain    COMPARISON: None available.    TECHNIQUE: Duplex sonography of the LEFT LOWER extremity veins with color   and spectral Doppler, with and without compression.    FINDINGS:    There is normal compressibility of the left common femoral, femoral and   popliteal veins.  The contralateral common femoral vein is patent.  Doppler examination shows normal spontaneous and phasic flow.    No calf vein thrombosis is detected.    IMPRESSION:  No evidence of left lower extremity deep venous thrombosis.            < end of copied text >  < from: Xray Foot AP + Lateral + Oblique, Left (04.11.24 @ 15:29) >      < end of copied text >  < from: Xray Foot AP + Lateral + Oblique, Left (04.11.24 @ 15:29) >      INTERPRETATION:  Bilateral ankles and left foot. Patient has local pain   and swelling.    COMPARISON: None available.    Left ankle. 3 views.    Arterial calcifications are noted.    There are some calcifications starting posterior to the posterior   calcaneus proceeding superiorly. There is mild degeneration of the   malleolar tips. No fracture.    Right ankle. 3 views.    There has been talar calcaneal fusion and also fusion of the lower fibula   with these 2 bones. Orthopedic hardware is seen over the lower medial   tibial area.    There is an inferior calcaneal spur.    No bone destruction or acute fracture.    Left foot. 3 views.    There is mild first MTP degeneration and mild swelling of the dorsum of   the foot. No fracture.    IMPRESSION: No acute fracture. Mild swelling dorsum left foot. Old fusion   of the right ankle joint with some orthopedic hardware.    --- End of Report ---    < end of copied text >      < from: CT Foot w/ IV Cont, Left (04.11.24 @ 19:27) >    INTERPRETATION:  Exam Type: CT FOOT WITH IV CONTRAST LEFT  Exam Date and Time: 4/11/2024 7:27 PM  Indication: Left foot pain. Evaluate forabscess.  Comparison: Same-day foot and ankle x-rays.    TECHNIQUE:  Multiplanar CT images of the left foot were obtained after administration   of 90 cc of Omnipaque 350 (10 cc discarded).    FINDINGS:  Evaluation of the left foot demonstrate juxtaarticular erosive changes at   the tibiotalar joint asymmetric to the distal lateral margin of the   tibia. Extensive mineralization is identified within the tibiotalar   joint. Similar findings of mineralization is seen about the first MTP   joint as well as at some of the TMT articulations. However, discrete   osseous erosions are not identified at these locations. There is no   osseous destruction or aggressive periosteal reaction identified. No   fracture or dislocation.    There is degenerative arthrosis involving the naviculocuneiform   articulation. No advanced productive changes are seen across the Lisfranc   interval.    Within the soft tissues, there is diffuse swelling identified along the   dorsal foot. There is fluid about the first TMT joint which decompresses   along the dorsal surface of the midfoot/forefoot. This fluid collection   along the dorsal midfoot/forefoot measures approximately 2.5 cm in the AP   dimension and approximately 2.9 cm in the transverse dimension. There is   peripheral enhancement with lack of central enhancement.    Mineralization is seen involving the distal insertional fibers of the   Achilles tendon as well as within the posterior tibialis tendon.   Otherwise, the imaged tendons are grossly intact.      IMPRESSION:  1.  Findings of gouty arthropathy about the foot as described. No CT   findings to suggest osteomyelitis.  2.  Peripheral enhancing fluid collection seen about the dorsal foot   about the first TMT joint as described is also felt to related to the   gouty arthropathy. An abscess is a less likely consideration but cannot   be entirely excluded on this examination.    < end of copied text >      CULTURES:

## 2024-04-19 LAB
ALBUMIN SERPL ELPH-MCNC: 3.2 G/DL — LOW (ref 3.5–5)
ALP SERPL-CCNC: 214 U/L — HIGH (ref 40–120)
ALT FLD-CCNC: 121 U/L DA — HIGH (ref 10–60)
ANION GAP SERPL CALC-SCNC: 6 MMOL/L — SIGNIFICANT CHANGE UP (ref 5–17)
AST SERPL-CCNC: 35 U/L — SIGNIFICANT CHANGE UP (ref 10–40)
BILIRUB SERPL-MCNC: 0.3 MG/DL — SIGNIFICANT CHANGE UP (ref 0.2–1.2)
BUN SERPL-MCNC: 31 MG/DL — HIGH (ref 7–18)
CALCIUM SERPL-MCNC: 8.8 MG/DL — SIGNIFICANT CHANGE UP (ref 8.4–10.5)
CHLORIDE SERPL-SCNC: 108 MMOL/L — SIGNIFICANT CHANGE UP (ref 96–108)
CO2 SERPL-SCNC: 24 MMOL/L — SIGNIFICANT CHANGE UP (ref 22–31)
CREAT SERPL-MCNC: 0.78 MG/DL — SIGNIFICANT CHANGE UP (ref 0.5–1.3)
EGFR: 95 ML/MIN/1.73M2 — SIGNIFICANT CHANGE UP
GLUCOSE BLDC GLUCOMTR-MCNC: 143 MG/DL — HIGH (ref 70–99)
GLUCOSE BLDC GLUCOMTR-MCNC: 152 MG/DL — HIGH (ref 70–99)
GLUCOSE SERPL-MCNC: 175 MG/DL — HIGH (ref 70–99)
HCT VFR BLD CALC: 40.6 % — SIGNIFICANT CHANGE UP (ref 39–50)
HGB BLD-MCNC: 13.2 G/DL — SIGNIFICANT CHANGE UP (ref 13–17)
MCHC RBC-ENTMCNC: 30.3 PG — SIGNIFICANT CHANGE UP (ref 27–34)
MCHC RBC-ENTMCNC: 32.5 GM/DL — SIGNIFICANT CHANGE UP (ref 32–36)
MCV RBC AUTO: 93.3 FL — SIGNIFICANT CHANGE UP (ref 80–100)
NRBC # BLD: 0 /100 WBCS — SIGNIFICANT CHANGE UP (ref 0–0)
PLATELET # BLD AUTO: 455 K/UL — HIGH (ref 150–400)
POTASSIUM SERPL-MCNC: 4.2 MMOL/L — SIGNIFICANT CHANGE UP (ref 3.5–5.3)
POTASSIUM SERPL-SCNC: 4.2 MMOL/L — SIGNIFICANT CHANGE UP (ref 3.5–5.3)
PROT SERPL-MCNC: 7.4 G/DL — SIGNIFICANT CHANGE UP (ref 6–8.3)
RBC # BLD: 4.35 M/UL — SIGNIFICANT CHANGE UP (ref 4.2–5.8)
RBC # FLD: 12.8 % — SIGNIFICANT CHANGE UP (ref 10.3–14.5)
SODIUM SERPL-SCNC: 138 MMOL/L — SIGNIFICANT CHANGE UP (ref 135–145)
WBC # BLD: 13.57 K/UL — HIGH (ref 3.8–10.5)
WBC # FLD AUTO: 13.57 K/UL — HIGH (ref 3.8–10.5)

## 2024-04-19 PROCEDURE — 99232 SBSQ HOSP IP/OBS MODERATE 35: CPT

## 2024-04-19 RX ORDER — HYDROMORPHONE HYDROCHLORIDE 2 MG/ML
1 INJECTION INTRAMUSCULAR; INTRAVENOUS; SUBCUTANEOUS ONCE
Refills: 0 | Status: DISCONTINUED | OUTPATIENT
Start: 2024-04-19 | End: 2024-04-19

## 2024-04-19 RX ORDER — INSULIN LISPRO 100/ML
VIAL (ML) SUBCUTANEOUS
Refills: 0 | Status: DISCONTINUED | OUTPATIENT
Start: 2024-04-19 | End: 2024-04-25

## 2024-04-19 RX ORDER — INSULIN LISPRO 100/ML
VIAL (ML) SUBCUTANEOUS AT BEDTIME
Refills: 0 | Status: DISCONTINUED | OUTPATIENT
Start: 2024-04-19 | End: 2024-04-25

## 2024-04-19 RX ADMIN — HYDROMORPHONE HYDROCHLORIDE 1 MILLIGRAM(S): 2 INJECTION INTRAMUSCULAR; INTRAVENOUS; SUBCUTANEOUS at 03:47

## 2024-04-19 RX ADMIN — Medication 650 MILLIGRAM(S): at 01:08

## 2024-04-19 RX ADMIN — AMPICILLIN SODIUM AND SULBACTAM SODIUM 200 GRAM(S): 250; 125 INJECTION, POWDER, FOR SUSPENSION INTRAMUSCULAR; INTRAVENOUS at 06:49

## 2024-04-19 RX ADMIN — AMPICILLIN SODIUM AND SULBACTAM SODIUM 200 GRAM(S): 250; 125 INJECTION, POWDER, FOR SUSPENSION INTRAMUSCULAR; INTRAVENOUS at 23:40

## 2024-04-19 RX ADMIN — AMPICILLIN SODIUM AND SULBACTAM SODIUM 200 GRAM(S): 250; 125 INJECTION, POWDER, FOR SUSPENSION INTRAMUSCULAR; INTRAVENOUS at 01:09

## 2024-04-19 RX ADMIN — Medication 650 MILLIGRAM(S): at 01:38

## 2024-04-19 RX ADMIN — HYDROMORPHONE HYDROCHLORIDE 1 MILLIGRAM(S): 2 INJECTION INTRAMUSCULAR; INTRAVENOUS; SUBCUTANEOUS at 03:17

## 2024-04-19 RX ADMIN — AMPICILLIN SODIUM AND SULBACTAM SODIUM 200 GRAM(S): 250; 125 INJECTION, POWDER, FOR SUSPENSION INTRAMUSCULAR; INTRAVENOUS at 13:20

## 2024-04-19 RX ADMIN — AMPICILLIN SODIUM AND SULBACTAM SODIUM 200 GRAM(S): 250; 125 INJECTION, POWDER, FOR SUSPENSION INTRAMUSCULAR; INTRAVENOUS at 18:20

## 2024-04-19 RX ADMIN — LOSARTAN POTASSIUM 25 MILLIGRAM(S): 100 TABLET, FILM COATED ORAL at 06:49

## 2024-04-19 NOTE — PROGRESS NOTE ADULT - SUBJECTIVE AND OBJECTIVE BOX
NP Note discussed with  Primary Attending    INTERVAL HPI/OVERNIGHT EVENTS: no new complaints    MEDICATIONS  (STANDING):  ampicillin/sulbactam  IVPB      ampicillin/sulbactam  IVPB 3 Gram(s) IV Intermittent every 6 hours  lactated ringers. 1000 milliLiter(s) (75 mL/Hr) IV Continuous <Continuous>  losartan 25 milliGRAM(s) Oral daily    MEDICATIONS  (PRN):  acetaminophen     Tablet .. 650 milliGRAM(s) Oral every 6 hours PRN Temp greater or equal to 38C (100.4F), Mild Pain (1 - 3)  ondansetron Injectable 4 milliGRAM(s) IV Push every 8 hours PRN Nausea and/or Vomiting      __________________________________________________  REVIEW OF SYSTEMS:    CONSTITUTIONAL: No fever,   EYES: no acute visual disturbances  NECK: No pain or stiffness  RESPIRATORY: No cough; No shortness of breath  CARDIOVASCULAR: No chest pain, no palpitations  GASTROINTESTINAL: No pain. No nausea or vomiting; No diarrhea   NEUROLOGICAL: No headache or numbness, no tremors  MUSCULOSKELETAL: No joint pain, no muscle pain  GENITOURINARY: no dysuria, no frequency, no hesitancy  PSYCHIATRY: no depression , no anxiety  ALL OTHER  ROS negative        Vital Signs Last 24 Hrs  T(C): 36.9 (19 Apr 2024 05:33), Max: 36.9 (19 Apr 2024 05:33)  T(F): 98.4 (19 Apr 2024 05:33), Max: 98.4 (19 Apr 2024 05:33)  HR: 86 (19 Apr 2024 05:33) (66 - 86)  BP: 146/75 (19 Apr 2024 05:33) (112/77 - 146/75)  BP(mean): 98 (18 Apr 2024 22:06) (83 - 98)  RR: 18 (19 Apr 2024 05:33) (16 - 20)  SpO2: 96% (19 Apr 2024 05:33) (95% - 98%)    Parameters below as of 19 Apr 2024 05:33  Patient On (Oxygen Delivery Method): room air        ________________________________________________  PHYSICAL EXAM:  GENERAL: NAD  HEENT: Normocephalic;  conjunctivae and sclerae clear; moist mucous membranes;   NECK : supple  CHEST/LUNG: Clear to auscultation bilaterally with good air entry   HEART: S1 S2  regular; no murmurs, gallops or rubs  ABDOMEN: Soft, Nontender, Nondistended; Bowel sounds present  EXTREMITIES: no cyanosis; no edema; no calf tenderness  SKIN: warm and dry; no rash  NERVOUS SYSTEM:  Awake and alert; Oriented  to place, person and time ; no new deficits    _________________________________________________  LABS:                        13.2   13.57 )-----------( 455      ( 19 Apr 2024 07:05 )             40.6     04-19    138  |  108  |  31<H>  ----------------------------<  175<H>  4.2   |  24  |  0.78    Ca    8.8      19 Apr 2024 07:05    TPro  7.4  /  Alb  3.2<L>  /  TBili  0.3  /  DBili  x   /  AST  35  /  ALT  121<H>  /  AlkPhos  214<H>  04-19    PT/INR - ( 18 Apr 2024 05:41 )   PT: 12.5 sec;   INR: 1.10 ratio         PTT - ( 18 Apr 2024 05:41 )  PTT:34.7 sec  Urinalysis Basic - ( 19 Apr 2024 07:05 )    Color: x / Appearance: x / SG: x / pH: x  Gluc: 175 mg/dL / Ketone: x  / Bili: x / Urobili: x   Blood: x / Protein: x / Nitrite: x   Leuk Esterase: x / RBC: x / WBC x   Sq Epi: x / Non Sq Epi: x / Bacteria: x      CAPILLARY BLOOD GLUCOSE      POCT Blood Glucose.: 93 mg/dL (18 Apr 2024 22:02)        RADIOLOGY & ADDITIONAL TESTS:    Imaging  Reviewed:  YES    Consultant(s) Notes Reviewed:   YES      Plan of care was discussed with patient and /or primary care giver; all questions and concerns were addressed  NP Note discussed with  Primary Attending    INTERVAL HPI/OVERNIGHT EVENTS: seen at bedside, pt states pain controlled well with medication.     MEDICATIONS  (STANDING):  ampicillin/sulbactam  IVPB      ampicillin/sulbactam  IVPB 3 Gram(s) IV Intermittent every 6 hours  lactated ringers. 1000 milliLiter(s) (75 mL/Hr) IV Continuous <Continuous>  losartan 25 milliGRAM(s) Oral daily    MEDICATIONS  (PRN):  acetaminophen     Tablet .. 650 milliGRAM(s) Oral every 6 hours PRN Temp greater or equal to 38C (100.4F), Mild Pain (1 - 3)  ondansetron Injectable 4 milliGRAM(s) IV Push every 8 hours PRN Nausea and/or Vomiting      __________________________________________________  REVIEW OF SYSTEMS:    CONSTITUTIONAL: No fever,   EYES: no acute visual disturbances  NECK: No pain or stiffness  RESPIRATORY: No cough; No shortness of breath  CARDIOVASCULAR: No chest pain, no palpitations  GASTROINTESTINAL: No pain. No nausea or vomiting; No diarrhea   NEUROLOGICAL: No headache or numbness, no tremors  MUSCULOSKELETAL: No joint pain, no muscle pain, mild post op pain to left foot 3/10  GENITOURINARY: no dysuria, no frequency, no hesitancy  PSYCHIATRY: no depression , no anxiety  ALL OTHER  ROS negative        Vital Signs Last 24 Hrs  T(C): 36.9 (19 Apr 2024 05:33), Max: 36.9 (19 Apr 2024 05:33)  T(F): 98.4 (19 Apr 2024 05:33), Max: 98.4 (19 Apr 2024 05:33)  HR: 86 (19 Apr 2024 05:33) (66 - 86)  BP: 146/75 (19 Apr 2024 05:33) (112/77 - 146/75)  BP(mean): 98 (18 Apr 2024 22:06) (83 - 98)  RR: 18 (19 Apr 2024 05:33) (16 - 20)  SpO2: 96% (19 Apr 2024 05:33) (95% - 98%)    Parameters below as of 19 Apr 2024 05:33  Patient On (Oxygen Delivery Method): room air  ________________________________________________  PHYSICAL EXAM:  GENERAL: NAD  HEENT: Normocephalic;  conjunctivae and sclerae clear; moist mucous membranes;   NECK : supple  CHEST/LUNG: Clear to auscultation bilaterally with good air entry   HEART: S1 S2  regular; no murmurs, gallops or rubs  ABDOMEN: Soft, Nontender, Nondistended; Bowel sounds present  EXTREMITIES: no cyanosis; no edema; no calf tenderness  SKIN: warm and dry; no rash, left foot dressing intact with ace wrap.   NERVOUS SYSTEM:  Awake and alert; Oriented  to place, person and time ; no new deficits    _________________________________________________  LABS:                        13.2   13.57 )-----------( 455      ( 19 Apr 2024 07:05 )             40.6     04-19    138  |  108  |  31<H>  ----------------------------<  175<H>  4.2   |  24  |  0.78    Ca    8.8      19 Apr 2024 07:05    TPro  7.4  /  Alb  3.2<L>  /  TBili  0.3  /  DBili  x   /  AST  35  /  ALT  121<H>  /  AlkPhos  214<H>  04-19    PT/INR - ( 18 Apr 2024 05:41 )   PT: 12.5 sec;   INR: 1.10 ratio         PTT - ( 18 Apr 2024 05:41 )  PTT:34.7 sec  Urinalysis Basic - ( 19 Apr 2024 07:05 )    Color: x / Appearance: x / SG: x / pH: x  Gluc: 175 mg/dL / Ketone: x  / Bili: x / Urobili: x   Blood: x / Protein: x / Nitrite: x   Leuk Esterase: x / RBC: x / WBC x   Sq Epi: x / Non Sq Epi: x / Bacteria: x      CAPILLARY BLOOD GLUCOSE  POCT Blood Glucose.: 93 mg/dL (18 Apr 2024 22:02)    RADIOLOGY & ADDITIONAL TESTS:    Imaging  Reviewed:  YES  < from: MR Foot No Cont, Left (04.16.24 @ 16:43) >    ACC: [id number] EXAM:  MR FOOT LT   ORDERED BY: REYNA SORTO     PROCEDURE DATE:  04/16/2024          INTERPRETATION:  Exam Type: MR FOOT LEFT  Exam Date and Time: 4/16/2024 4:43 PM  Indication: Infection  Comparison: Left foot CT on 4/11/2024    TECHNIQUE:  Multiplanar multisequence MRI of the left hindfoot was performed.    FINDINGS:  BONES/JOINTS:  Again seen is juxtaarticular erosive changes at the tibiofibular   articulation most significant at the lateral aspect of the tibia.   Moderate osseous edema of the first and second tarsometatarsal joints   with mild erosive change along the lateral base of the first metatarsal   and medial aspect of the middle cuneiform. Cystic changes along the   medial aspect of the calcaneus are again visualized. No fracture.   Moderate osteoarthritis of the talonavicular articulation as well as   tarsometatarsal joints. Well-corticated ossific fragment distal to medial   malleolus from prior injury. Ankle mortise is intact. Small moderate   tibiotalar and subtalar joint effusions.    LATERAL LIGAMENTS:  Anterior talofibular ligament (ATFL): Thickened from chronic sprain.  Calcaneofibular ligament (CFL): Chronically sprained.  Posterior talofibular ligament (PTFL): Chronically sprained.  Tibiofibular syndesmosis: Heterogeneity spanning the anterior and   posterior syndesmotic ligaments which are not well identified.    LATERAL (PERONEAL) TENDONS:  Peroneus longus: No tendinosis or tear. No tenosynovitis.  Peroneus brevis: No tendinosis or tear. No tenosynovitis.    MEDIAL LIGAMENTS:  Deltoid ligament: Chronically sprained.  Spring ligament: Normal.    MEDIAL (FLEXOR) TENDONS:  Posterior tibialis: Moderate posterior tibialis tendinosis. Mild   tenosynovitis.  Flexor digitorum longus: No tendinosis or tear. Mild tenosynovitis.  Flexor hallucis longus: No tendinosis or tear. Mild tenosynovitis.    ANTERIOR (EXTENSOR) TENDONS:  Anterior tibialis: No tendinosis or tear. No tenosynovitis.  Extensor hallucis longus: No tendinosis or tear. No tenosynovitis.  Extensor digitorum longus: No tendinosis or tear. No tenosynovitis.    LISFRANC LIGAMENT: Marked edema at the Lisfranc interval. The Lisfranc   ligament appears grossly intact.    ACHILLES TENDON: Moderate distal Achilles tendinosis with questionable   low-grade interstitial tearing at the insertion.    PLANTAR FASCIA: Chronic thickening with minimal perifascial edema along   the medial band.    OTHER POSTERIOR/SOFT TISSUE STRUCTURES:  Sinus tarsi: Mild infiltration of the normalsinus tarsi fat.  Tarsal tunnel: Unimpinged; no mass effect.  Muscles: Edema within the partially visualized muscles of the forefoot.  Soft tissues: Diffuse soft tissue swelling at the dorsum of the foot.   Fluid surrounding the first tarsometatarsaljoint decompresses along the   dorsal surface of the midfoot/forefoot.    IMPRESSION:  1.  Again seen are findings of gouty arthropathy about the hindfoot. The   again seen fluid collection surrounding the first tarsometatarsal joint   and extending into the dorsal aspect of the foot is not well-visualized   and likely related to the gouty arthropathy given adjacent erosive   changes. However, underlying abscess infectious etiology cannot be   excluded. Clinical correlation is advised.  2.  Moderate distal Achilles tendinosis with questionable low-grade   interstitial tearing at the insertion.  3.  Moderate posterior tibialis tendinosis. Mild tenosynovitis.  4.  Chronic plantar fasciitis.    --- End of Report ---            LEOPOLDO AQUINO DO; Attending Radiologist  This document has been electronically signed. Apr 16 2024  5:05PM    < end of copied text >    < from: CT Foot w/ IV Cont, Left (04.11.24 @ 19:27) >  IMPRESSION:  1.  Findings of gouty arthropathy about the foot as described. No CT   findings to suggest osteomyelitis.  2.  Peripheral enhancing fluid collection seen about the dorsal foot   about the first TMT joint as described is also felt to related to the   gouty arthropathy. An abscess is a less likely consideration but cannot   be entirely excluded on this examination.    < end of copied text >     < from: Xray Foot AP + Lateral + Oblique, Left (04.11.24 @ 15:29) >  IMPRESSION: No acute fracture. Mild swelling dorsum left foot. Old fusion   of the right ankle joint with some orthopedic hardware.    < end of copied text >    Consultant(s) Notes Reviewed:   YES      Plan of care was discussed with patient and /or primary care giver; all questions and concerns were addressed

## 2024-04-19 NOTE — PROGRESS NOTE ADULT - PROBLEM SELECTOR PLAN 2
bcx 4/11- staph epi mrsa, staph hommis ( likely contaminated)  bcx 4/13- NGTD    ID Nasreen follows -bcx 4/11- staph epi mrsa, staph hommis ( likely contaminated)  -bcx 4/13- NGTD  -ID Nasreen follows

## 2024-04-19 NOTE — PHYSICAL THERAPY INITIAL EVALUATION ADULT - GENERAL OBSERVATIONS, REHAB EVAL
Consult received, chart reviewed. Patient received supine in bed, NAD, + IV. Patient agreed to EVALUATION from Physical Therapist.

## 2024-04-19 NOTE — PROGRESS NOTE ADULT - PROBLEM SELECTOR PLAN 4
-hold hydroxychloroquine inpt -hold hydroxychloroquine inpt  -outpt follow up with own rheumatology.

## 2024-04-19 NOTE — PHYSICAL THERAPY INITIAL EVALUATION ADULT - IMPAIRMENTS FOUND, PT EVAL
aerobic capacity/endurance/gait, locomotion, and balance/gross motor/integumentary integrity/joint integrity and mobility/muscle strength

## 2024-04-19 NOTE — CHART NOTE - NSCHARTNOTEFT_GEN_A_CORE
EVENT: C/o pain 7/10 to left foot S/p  incision and drainage of deep wound abscess,     BRIEF HPI: 73 year old Male from home with PMH of HDL, prediabetes, SLE on hydroxychloroquine presents with left foot pain over the past 4 days. Admitted to medicine for L foot cellulitis. 4/18 - I&D by podiatry. Pt to start anti inflammatory after procedure, PT re-eval after procedure. Now post op pain. S/p incision and drainage of deep wound abscess.     OBJECTIVE:  Vital Signs Last 24 Hrs  T(C): 36.6 (18 Apr 2024 21:21), Max: 36.9 (18 Apr 2024 05:55)  T(F): 97.9 (18 Apr 2024 21:21), Max: 98.4 (18 Apr 2024 05:55)  HR: 66 (18 Apr 2024 22:06) (66 - 85)  BP: 135/81 (18 Apr 2024 22:06) (107/69 - 135/81)  BP(mean): 98 (18 Apr 2024 22:06) (83 - 98)  RR: 17 (18 Apr 2024 22:06) (16 - 20)  SpO2: 98% (18 Apr 2024 22:06) (95% - 98%)    Parameters below as of 18 Apr 2024 22:06  Patient On (Oxygen Delivery Method): room air    FOCUSED PHYSICAL EXAM:  NEURO: Awake, oriented   RESP: Even, unlabored  CV: S1 S2, regular    LABS:                        13.4   10.10 )-----------( 451      ( 18 Apr 2024 05:41 )             40.8     04-18    140  |  110<H>  |  25<H>  ----------------------------<  118<H>  4.3   |  23  |  0.73    Ca    9.3      18 Apr 2024 05:41    TPro  7.6  /  Alb  3.1<L>  /  TBili  0.4  /  DBili  x   /  AST  53<H>  /  ALT  162<H>  /  Alk Phos  244<H>  04-18    PLAN:   HYDROmorphone  Injectable 1 jose GRAM(s) IV Push once now for pain unrelieved by Tylenol    FOLLOW UP / RESULT: effect of medication

## 2024-04-19 NOTE — PROGRESS NOTE ADULT - ASSESSMENT
A:  Left foot infected foot  s/p I &D left foot 4/18    P:   Patient evaluated and Chart reviewed   Discussed diagnosis and treatment with patient  X-rays evaluated, results as noted above  CT results reviewed   Continue with IV antibiotics As Per ID  Reviewed MRI, see above noted  Wound was dressed with iodoform packing, dsd  S/p Left foot I &D, approx 10 cc of purulent drainage was expressed from left foot. No further purulence noted on exam today. Incision site left open with packing to allow for drainage.   pending intra op cx   Weight bearing as tolerated left foot   Podiatry to follow while in house.    Discussed with Attending Dr. Valencia     WCO: Betadine, 4x4 gauze, abd, ,jorge ace bandage to be changed every other day  A:  Left foot infected foot  s/p I &D left foot 4/18    P:   Patient evaluated and Chart reviewed   Discussed diagnosis and treatment with patient  X-rays evaluated, results as noted above  CT results reviewed   Continue with IV antibiotics As Per ID  Reviewed MRI, see above noted  Wound was dressed with iodoform packing, dsd  S/p Left foot I &D, approx 10 cc of purulent drainage was expressed from left foot. No further purulence noted on exam today. Incision site left open with packing to allow for drainage.   pending intra op cx   Please keep patient NONWB until tomorrow 4/20 as patient had recent I &D and incision sites are open. Following 4/20 Patient can be heel weight bearing left foot as tolerated.   Podiatry to follow while in house.    Discussed with Attending Dr. Valencia     WCO: Betadine, 4x4 gauze, abd, ,jorge ace bandage to be changed every other day

## 2024-04-19 NOTE — PROGRESS NOTE ADULT - PROBLEM SELECTOR PLAN 1
- P/W right foot increased ecchymosis on dorsal foot. Erythema, and edema noted to the left foot.   - Xray foot- Mild swelling dorsum left foot. Old fusion of the right ankle joint with some orthopedic hardware.  - MRI left foot- gouty arthropathy about the hindfoot. fluid collection surrounding the first tarsometatarsal joint.     Moderate distal Achilles tendinosis with questionable low-grade interstitial tearing at the insertion. Mild     tenosynovitis. Chronic plantar fasciitis.  - Marlon duplex- Neg for DVT   - c/w unasyn  - I & D 4/18/24  -pain management , podiatry following, DIANDRA Downey - P/W right foot increased ecchymosis on dorsal foot. Erythema, and edema noted to the left foot.   - Xray foot- Mild swelling dorsum left foot. Old fusion of the right ankle joint with some orthopedic hardware.  - MRI left foot- gouty arthropathy about the hindfoot. fluid collection surrounding the first tarsometatarsal joint.     Moderate distal Achilles tendinosis with questionable low-grade interstitial tearing at the insertion. Mild     tenosynovitis. Chronic plantar fasciitis.  -Marlon duplex- Neg for DVT   -c/w unasyn  -I & D 4/18/24  -Incision site left open with packing to allow for drainage per podiatry. pending intra op cx.   -Podiatry reccs to keep NONWB until tomorrow 4/20 as patient had recent I &D and incision sites are open.   Following 4/20 Patient can be heel weight bearing left foot as tolerated.   -pain management   -Podiatry following  -ID Nasreen following, Upon DC may switch to Augmentin 875mgs po bid per reccs.   -WCO: Betadine, 4x4 gauze, abd, ,jorge ace bandage to be changed every other day

## 2024-04-19 NOTE — PROGRESS NOTE ADULT - SUBJECTIVE AND OBJECTIVE BOX
Interval: Patient was seen resting bedside. POD#1 S/p L foot I&D. Patient admits to pain to the left foot, likely 2/2 surgical incisions. Denies any other pedal complaints at this time.   Patient is a 73y old  Male who presents with a chief complaint of left foot pain    HPI:73-year-old male with past medical history hyperlipidemia, prediabetes, SLE on hydroxychloroquine presents with left foot pain over the past 4 days.  Patient states he stepped on a stone 4 days ago, states shortly afterwards he start experiencing left foot redness, swelling, pain, fevers, and chills.  Patient report taking ibuprofen with little relief.  Patient seen by primary care doctor today, advised to the emergency department further evaluation.  Denies any nasal congestion, cough, chest pain, shortness of breath, abdominal pain, vomiting, numbness, or weakness.  Denies any additional complaints.      Podiatry HPI: 73-year-old male with past medical history hyperlipidemia, prediabetes, SLE on hydroxychloroquine presents with left foot pain over the past 4 days. Patient states that one monday he was walking and stepped on a stone. Patient states later that day he began to develop pain and fevers and chills. Patient states that this entire week his foot has become increasingly swollen and he has been unable to walk. Patient states he has had fevers and chills all week long. Patient went to his PCP who gave him pain meds and recommened he come to ED for further workup.     Medications acetaminophen     Tablet .. 650 milliGRAM(s) Oral every 6 hours PRN  ampicillin/sulbactam  IVPB 3 Gram(s) IV Intermittent every 6 hours  ampicillin/sulbactam  IVPB      lactated ringers. 1000 milliLiter(s) IV Continuous <Continuous>  losartan 25 milliGRAM(s) Oral daily  ondansetron Injectable 4 milliGRAM(s) IV Push every 8 hours PRN    FH,   PMHOsteoarthritis    Osteoarthritis of left knee    High cholesterol       PSHNo significant past surgical history    H/O foot surgery        Labs                          13.2   13.57 )-----------( 455      ( 19 Apr 2024 07:05 )             40.6      04-19    138  |  108  |  31<H>  ----------------------------<  175<H>  4.2   |  24  |  0.78    Ca    8.8      19 Apr 2024 07:05    TPro  7.4  /  Alb  3.2<L>  /  TBili  0.3  /  DBili  x   /  AST  35  /  ALT  121<H>  /  AlkPhos  214<H>  04-19     Vital Signs Last 24 Hrs  T(C): 36.9 (19 Apr 2024 05:33), Max: 36.9 (19 Apr 2024 05:33)  T(F): 98.4 (19 Apr 2024 05:33), Max: 98.4 (19 Apr 2024 05:33)  HR: 86 (19 Apr 2024 05:33) (66 - 86)  BP: 146/75 (19 Apr 2024 05:33) (112/77 - 146/75)  BP(mean): 98 (18 Apr 2024 22:06) (83 - 98)  RR: 18 (19 Apr 2024 05:33) (16 - 20)  SpO2: 96% (19 Apr 2024 05:33) (95% - 98%)    Parameters below as of 19 Apr 2024 05:33  Patient On (Oxygen Delivery Method): room air      Sedimentation Rate, Erythrocyte: 71 mm/Hr (04-18-24 @ 05:41)  Sedimentation Rate, Erythrocyte: 86 mm/Hr (04-11-24 @ 14:45)         C-Reactive Protein, Serum: 28 mg/L (04-18-24 @ 05:41)  C-Reactive Protein, Serum: 106 mg/L (04-14-24 @ 05:41)  C-Reactive Protein, Serum: 279 mg/L (04-11-24 @ 14:45)   WBC Count: 13.57 K/uL *H* (04-19-24 @ 07:05)        PHYSICAL EXAM  LE Focused:    Vasc: DP & PT palpable right foot. Left foot PT palpable, DP non palpable 2/2 edema. CFT <3 seconds to all 10 digits b/l. TG warm to warm left foot.   Derm: Post op: Medial left foot incision left open, surgical site appears erytheamtous, with base granualr. Dorsal incision extended approximaltely 3 cm, Wound base granular. No further purulence noted. Left foot increased ecchymosis noted to the dorsal aspect of the foot. Erythema, and edema noted to the left foot. No signs of any open lesions noted. Increased temperature noted to the dorsum of the left foot. No fluctuance noted over the dorsal aspect. No soft tissue crepitus noted. combined total of 12 cc or purulent drainage has been obtained from dorsal foot wound. Further purlent drainage was expressed from the wound 4/14 approx 2 cc.   Neuro: Gross and light touch sensation intact b/l  MSK: PTP to the left foot       IMAGING: ?xray    < from: US Duplex Venous Lower Ext Ltd, Left (04.11.24 @ 15:49) >  PROCEDURE DATE:  04/11/2024          INTERPRETATION:  CLINICAL INFORMATION: Left foot pain    COMPARISON: None available.    TECHNIQUE: Duplex sonography of the LEFT LOWER extremity veins with color   and spectral Doppler, with and without compression.    FINDINGS:    There is normal compressibility of the left common femoral, femoral and   popliteal veins.  The contralateral common femoral vein is patent.  Doppler examination shows normal spontaneous and phasic flow.    No calf vein thrombosis is detected.    IMPRESSION:  No evidence of left lower extremity deep venous thrombosis.            < end of copied text >  < from: Xray Foot AP + Lateral + Oblique, Left (04.11.24 @ 15:29) >      < end of copied text >  < from: Xray Foot AP + Lateral + Oblique, Left (04.11.24 @ 15:29) >      INTERPRETATION:  Bilateral ankles and left foot. Patient has local pain   and swelling.    COMPARISON: None available.    Left ankle. 3 views.    Arterial calcifications are noted.    There are some calcifications starting posterior to the posterior   calcaneus proceeding superiorly. There is mild degeneration of the   malleolar tips. No fracture.    Right ankle. 3 views.    There has been talar calcaneal fusion and also fusion of the lower fibula   with these 2 bones. Orthopedic hardware is seen over the lower medial   tibial area.    There is an inferior calcaneal spur.    No bone destruction or acute fracture.    Left foot. 3 views.    There is mild first MTP degeneration and mild swelling of the dorsum of   the foot. No fracture.    IMPRESSION: No acute fracture. Mild swelling dorsum left foot. Old fusion   of the right ankle joint with some orthopedic hardware.    --- End of Report ---    < end of copied text >      < from: CT Foot w/ IV Cont, Left (04.11.24 @ 19:27) >    INTERPRETATION:  Exam Type: CT FOOT WITH IV CONTRAST LEFT  Exam Date and Time: 4/11/2024 7:27 PM  Indication: Left foot pain. Evaluate forabscess.  Comparison: Same-day foot and ankle x-rays.    TECHNIQUE:  Multiplanar CT images of the left foot were obtained after administration   of 90 cc of Omnipaque 350 (10 cc discarded).    FINDINGS:  Evaluation of the left foot demonstrate juxtaarticular erosive changes at   the tibiotalar joint asymmetric to the distal lateral margin of the   tibia. Extensive mineralization is identified within the tibiotalar   joint. Similar findings of mineralization is seen about the first MTP   joint as well as at some of the TMT articulations. However, discrete   osseous erosions are not identified at these locations. There is no   osseous destruction or aggressive periosteal reaction identified. No   fracture or dislocation.    There is degenerative arthrosis involving the naviculocuneiform   articulation. No advanced productive changes are seen across the Lisfranc   interval.    Within the soft tissues, there is diffuse swelling identified along the   dorsal foot. There is fluid about the first TMT joint which decompresses   along the dorsal surface of the midfoot/forefoot. This fluid collection   along the dorsal midfoot/forefoot measures approximately 2.5 cm in the AP   dimension and approximately 2.9 cm in the transverse dimension. There is   peripheral enhancement with lack of central enhancement.    Mineralization is seen involving the distal insertional fibers of the   Achilles tendon as well as within the posterior tibialis tendon.   Otherwise, the imaged tendons are grossly intact.      IMPRESSION:  1.  Findings of gouty arthropathy about the foot as described. No CT   findings to suggest osteomyelitis.  2.  Peripheral enhancing fluid collection seen about the dorsal foot   about the first TMT joint as described is also felt to related to the   gouty arthropathy. An abscess is a less likely consideration but cannot   be entirely excluded on this examination.    < end of copied text >      CULTURES:

## 2024-04-19 NOTE — PROGRESS NOTE ADULT - ASSESSMENT
FULL NOTE TO FOLLOW>< Patient is a 73 year old Male from home with PMHx of HLD, prediabetes, SLE on hydroxychloroquine presents with left foot pain over the past 4 days. Admitted to medicine for L foot cellulitis with abscess. ID and podiatry consulted. Pain consulted.   Pt underwent I &D on 4/18.  Incision site left open with packing to allow for drainage per podiatry. pending intra op cx.   Podiatry reccs to keep NONWB until tomorrow 4/20 as patient had recent I &D and incision sites are open.   Following 4/20 Patient can be heel weight bearing left foot as tolerated.   post op PT eval -home PT.

## 2024-04-19 NOTE — PROGRESS NOTE ADULT - NS ATTEND AMEND GEN_ALL_CORE FT
s/p I+ d yesterday, painful wound, better this am. No fever or chills, no nausea no vomitng    #Sepsis secondary to soft tissue infection  # Left foot cellulitis w/ abscess  # Gram Positive Bacteremia(likely contamination)  #Gouty arthritis  #SLE  #preDM  #HLD    s/p I+D 4/18, for wound closure monday 4/22  Prediabetes needs more effective lifestyle modifications, AM sugars appears well controlled, added sliding scale for better infectious control  Hydroxychloroquine was discontinued  Uric Acid level controlled  Home losartan resumed  Will start anti-inflammatories after OR procedure by podiatry

## 2024-04-19 NOTE — PHYSICAL THERAPY INITIAL EVALUATION ADULT - ADDITIONAL COMMENTS
Pt lives in a private home. 18 stairs total (6 to enter, 12 to second floor where bedroom is). Pt was fully independent prior to admission.

## 2024-04-19 NOTE — PROGRESS NOTE ADULT - PROBLEM SELECTOR PLAN 10
-pending OR for I&D w/ podiatry ( 4/18/24 ) -s/p I&D 4/18, pending intra OP culture  -possible podiatry plan for wound closure on Monday.   -NWB left foot until 4/20 then heel weight bearing left foot as tolerated.   -PT recs home PT  -can switch to PO Augmentin upon DC

## 2024-04-20 LAB
ANION GAP SERPL CALC-SCNC: 7 MMOL/L — SIGNIFICANT CHANGE UP (ref 5–17)
BASOPHILS # BLD AUTO: 0.04 K/UL — SIGNIFICANT CHANGE UP (ref 0–0.2)
BASOPHILS NFR BLD AUTO: 0.3 % — SIGNIFICANT CHANGE UP (ref 0–2)
BUN SERPL-MCNC: 17 MG/DL — SIGNIFICANT CHANGE UP (ref 7–18)
CALCIUM SERPL-MCNC: 8.8 MG/DL — SIGNIFICANT CHANGE UP (ref 8.4–10.5)
CHLORIDE SERPL-SCNC: 111 MMOL/L — HIGH (ref 96–108)
CO2 SERPL-SCNC: 24 MMOL/L — SIGNIFICANT CHANGE UP (ref 22–31)
CREAT SERPL-MCNC: 0.72 MG/DL — SIGNIFICANT CHANGE UP (ref 0.5–1.3)
CRP SERPL-MCNC: 24 MG/L — HIGH
EGFR: 97 ML/MIN/1.73M2 — SIGNIFICANT CHANGE UP
EOSINOPHIL # BLD AUTO: 0.29 K/UL — SIGNIFICANT CHANGE UP (ref 0–0.5)
EOSINOPHIL NFR BLD AUTO: 2.2 % — SIGNIFICANT CHANGE UP (ref 0–6)
ERYTHROCYTE [SEDIMENTATION RATE] IN BLOOD: 71 MM/HR — HIGH (ref 0–20)
GLUCOSE BLDC GLUCOMTR-MCNC: 102 MG/DL — HIGH (ref 70–99)
GLUCOSE BLDC GLUCOMTR-MCNC: 104 MG/DL — HIGH (ref 70–99)
GLUCOSE BLDC GLUCOMTR-MCNC: 132 MG/DL — HIGH (ref 70–99)
GLUCOSE BLDC GLUCOMTR-MCNC: 144 MG/DL — HIGH (ref 70–99)
GLUCOSE SERPL-MCNC: 121 MG/DL — HIGH (ref 70–99)
HCT VFR BLD CALC: 40 % — SIGNIFICANT CHANGE UP (ref 39–50)
HGB BLD-MCNC: 12.9 G/DL — LOW (ref 13–17)
IMM GRANULOCYTES NFR BLD AUTO: 0.7 % — SIGNIFICANT CHANGE UP (ref 0–0.9)
LYMPHOCYTES # BLD AUTO: 16.5 % — SIGNIFICANT CHANGE UP (ref 13–44)
LYMPHOCYTES # BLD AUTO: 2.2 K/UL — SIGNIFICANT CHANGE UP (ref 1–3.3)
MCHC RBC-ENTMCNC: 30.4 PG — SIGNIFICANT CHANGE UP (ref 27–34)
MCHC RBC-ENTMCNC: 32.3 GM/DL — SIGNIFICANT CHANGE UP (ref 32–36)
MCV RBC AUTO: 94.3 FL — SIGNIFICANT CHANGE UP (ref 80–100)
MONOCYTES # BLD AUTO: 0.9 K/UL — SIGNIFICANT CHANGE UP (ref 0–0.9)
MONOCYTES NFR BLD AUTO: 6.7 % — SIGNIFICANT CHANGE UP (ref 2–14)
NEUTROPHILS # BLD AUTO: 9.84 K/UL — HIGH (ref 1.8–7.4)
NEUTROPHILS NFR BLD AUTO: 73.6 % — SIGNIFICANT CHANGE UP (ref 43–77)
NRBC # BLD: 0 /100 WBCS — SIGNIFICANT CHANGE UP (ref 0–0)
PLATELET # BLD AUTO: 465 K/UL — HIGH (ref 150–400)
POTASSIUM SERPL-MCNC: 4.1 MMOL/L — SIGNIFICANT CHANGE UP (ref 3.5–5.3)
POTASSIUM SERPL-SCNC: 4.1 MMOL/L — SIGNIFICANT CHANGE UP (ref 3.5–5.3)
RBC # BLD: 4.24 M/UL — SIGNIFICANT CHANGE UP (ref 4.2–5.8)
RBC # FLD: 13.1 % — SIGNIFICANT CHANGE UP (ref 10.3–14.5)
SODIUM SERPL-SCNC: 142 MMOL/L — SIGNIFICANT CHANGE UP (ref 135–145)
WBC # BLD: 13.37 K/UL — HIGH (ref 3.8–10.5)
WBC # FLD AUTO: 13.37 K/UL — HIGH (ref 3.8–10.5)

## 2024-04-20 PROCEDURE — 99232 SBSQ HOSP IP/OBS MODERATE 35: CPT

## 2024-04-20 RX ADMIN — AMPICILLIN SODIUM AND SULBACTAM SODIUM 200 GRAM(S): 250; 125 INJECTION, POWDER, FOR SUSPENSION INTRAMUSCULAR; INTRAVENOUS at 12:06

## 2024-04-20 RX ADMIN — AMPICILLIN SODIUM AND SULBACTAM SODIUM 200 GRAM(S): 250; 125 INJECTION, POWDER, FOR SUSPENSION INTRAMUSCULAR; INTRAVENOUS at 05:40

## 2024-04-20 RX ADMIN — Medication 650 MILLIGRAM(S): at 12:31

## 2024-04-20 RX ADMIN — Medication 650 MILLIGRAM(S): at 13:31

## 2024-04-20 RX ADMIN — AMPICILLIN SODIUM AND SULBACTAM SODIUM 200 GRAM(S): 250; 125 INJECTION, POWDER, FOR SUSPENSION INTRAMUSCULAR; INTRAVENOUS at 23:34

## 2024-04-20 RX ADMIN — LOSARTAN POTASSIUM 25 MILLIGRAM(S): 100 TABLET, FILM COATED ORAL at 05:40

## 2024-04-20 RX ADMIN — AMPICILLIN SODIUM AND SULBACTAM SODIUM 200 GRAM(S): 250; 125 INJECTION, POWDER, FOR SUSPENSION INTRAMUSCULAR; INTRAVENOUS at 18:29

## 2024-04-20 NOTE — PROGRESS NOTE ADULT - ASSESSMENT
A:  Left foot infected foot  s/p I &D left foot 4/18    P:   Patient evaluated and Chart reviewed   Discussed diagnosis and treatment with patient  X-rays evaluated, results as noted above  CT results reviewed   Continue with IV antibiotics As Per ID  Reviewed MRI, see above noted  Wound was dressed with iodoform packing, dsd  S/p Left foot I &D, approx 10 cc of purulent drainage was expressed from left foot. No further purulence noted on exam today. Incision site left open with packing to allow for drainage.   pending intra op cx   Plan for possible bedside primary closure vs outpatient primary closure in clinic. Pending intraop cx and further evaluation of left foot surgical sites.   Please keep patient NONWB until tomorrow 4/21 as patient had recent I &D and incision sites are open. Following 4/21 Patient can be heel weight bearing left foot as tolerated.   Podiatry to follow while in house.    Discussed with Attending Dr. Valencia     WCO: Betadine, 4x4 gauze, abd, ,jorge ace bandage to be changed every other day

## 2024-04-20 NOTE — PROGRESS NOTE ADULT - ASSESSMENT
#Sepsis secondary to soft tissue infection  # Left foot cellulitis w/ abscess  # Gram Positive Bacteremia(likely contamination)  #Gouty arthritis  #SLE  #preDM  #HLD    s/p I+D 4/18, for wound closure monday 4/22  Prediabetes needs more effective lifestyle modifications, AM sugars appears well controlled, added sliding scale for better infectious control  Hydroxychloroquine was discontinued  Uric Acid level controlled  Home losartan resumed  Will start anti-inflammatories after OR procedure by podiatry.

## 2024-04-20 NOTE — PROGRESS NOTE ADULT - SUBJECTIVE AND OBJECTIVE BOX
INTERVAL HPI/OVERNIGHT EVENTS:   No new symptoms, pain persistent, able to ambulate today    REVIEW OF SYSTEMS:  no fever no chills    Vital Signs Last 24 Hrs  T(C): 36.6 (20 Apr 2024 12:28), Max: 37.1 (19 Apr 2024 20:30)  T(F): 97.9 (20 Apr 2024 12:28), Max: 98.7 (19 Apr 2024 20:30)  HR: 74 (20 Apr 2024 12:28) (74 - 80)  BP: 113/67 (20 Apr 2024 12:28) (112/67 - 138/70)  BP(mean): 72 (20 Apr 2024 05:00) (72 - 72)  RR: 17 (20 Apr 2024 12:28) (17 - 18)  SpO2: 94% (20 Apr 2024 12:28) (94% - 97%)    Parameters below as of 20 Apr 2024 12:28  Patient On (Oxygen Delivery Method): room air        PHYSICAL EXAMINATION:  NAD, heart reuglar, lungs clear,a bd soft nontender, left lower ext foot wrapped by podiatry                          12.9   13.37 )-----------( 465      ( 20 Apr 2024 06:33 )             40.0     04-20    142  |  111<H>  |  17  ----------------------------<  121<H>  4.1   |  24  |  0.72    Ca    8.8      20 Apr 2024 06:33    TPro  7.4  /  Alb  3.2<L>  /  TBili  0.3  /  DBili  x   /  AST  35  /  ALT  121<H>  /  AlkPhos  214<H>  04-19    LIVER FUNCTIONS - ( 19 Apr 2024 07:05 )  Alb: 3.2 g/dL / Pro: 7.4 g/dL / ALK PHOS: 214 U/L / ALT: 121 U/L DA / AST: 35 U/L / GGT: x                   CAPILLARY BLOOD GLUCOSE      RADIOLOGY & ADDITIONAL TESTS:

## 2024-04-20 NOTE — PROGRESS NOTE ADULT - SUBJECTIVE AND OBJECTIVE BOX
Interval: Patient was seen resting bedside. POD#2 S/p L foot I&D. Patient admits to pain to the left foot, likely 2/2 surgical incisions. No purulent drainage noted on expression of the wound. Pending intraop cx.  Denies any other pedal complaints at this time.     Patient is a 73y old  Male who presents with a chief complaint of left foot pain    HPI:73-year-old male with past medical history hyperlipidemia, prediabetes, SLE on hydroxychloroquine presents with left foot pain over the past 4 days.  Patient states he stepped on a stone 4 days ago, states shortly afterwards he start experiencing left foot redness, swelling, pain, fevers, and chills.  Patient report taking ibuprofen with little relief.  Patient seen by primary care doctor today, advised to the emergency department further evaluation.  Denies any nasal congestion, cough, chest pain, shortness of breath, abdominal pain, vomiting, numbness, or weakness.  Denies any additional complaints.      Podiatry HPI: 73-year-old male with past medical history hyperlipidemia, prediabetes, SLE on hydroxychloroquine presents with left foot pain over the past 4 days. Patient states that one monday he was walking and stepped on a stone. Patient states later that day he began to develop pain and fevers and chills. Patient states that this entire week his foot has become increasingly swollen and he has been unable to walk. Patient states he has had fevers and chills all week long. Patient went to his PCP who gave him pain meds and recommened he come to ED for further workup.     Medications acetaminophen     Tablet .. 650 milliGRAM(s) Oral every 6 hours PRN  ampicillin/sulbactam  IVPB 3 Gram(s) IV Intermittent every 6 hours  ampicillin/sulbactam  IVPB      insulin lispro (ADMELOG) corrective regimen sliding scale   SubCutaneous three times a day before meals  insulin lispro (ADMELOG) corrective regimen sliding scale   SubCutaneous at bedtime  lactated ringers. 1000 milliLiter(s) IV Continuous <Continuous>  losartan 25 milliGRAM(s) Oral daily  ondansetron Injectable 4 milliGRAM(s) IV Push every 8 hours PRN    FH,   PMHOsteoarthritis    Osteoarthritis of left knee    High cholesterol       PSHNo significant past surgical history    H/O foot surgery        Labs                          12.9   13.37 )-----------( 465      ( 20 Apr 2024 06:33 )             40.0      04-20    142  |  111<H>  |  17  ----------------------------<  121<H>  4.1   |  24  |  0.72    Ca    8.8      20 Apr 2024 06:33    TPro  7.4  /  Alb  3.2<L>  /  TBili  0.3  /  DBili  x   /  AST  35  /  ALT  121<H>  /  AlkPhos  214<H>  04-19     Vital Signs Last 24 Hrs  T(C): 36.6 (20 Apr 2024 12:28), Max: 37.1 (19 Apr 2024 20:30)  T(F): 97.9 (20 Apr 2024 12:28), Max: 98.7 (19 Apr 2024 20:30)  HR: 74 (20 Apr 2024 12:28) (74 - 85)  BP: 113/67 (20 Apr 2024 12:28) (112/67 - 138/70)  BP(mean): 72 (20 Apr 2024 05:00) (72 - 72)  RR: 17 (20 Apr 2024 12:28) (17 - 18)  SpO2: 94% (20 Apr 2024 12:28) (94% - 97%)    Parameters below as of 20 Apr 2024 12:28  Patient On (Oxygen Delivery Method): room air      Sedimentation Rate, Erythrocyte: 71 mm/Hr (04-20-24 @ 06:33)  Sedimentation Rate, Erythrocyte: 71 mm/Hr (04-18-24 @ 05:41)         C-Reactive Protein, Serum: 24 mg/L (04-20-24 @ 06:33)  C-Reactive Protein, Serum: 28 mg/L (04-18-24 @ 05:41)  C-Reactive Protein, Serum: 106 mg/L (04-14-24 @ 05:41)  C-Reactive Protein, Serum: 279 mg/L (04-11-24 @ 14:45)   WBC Count: 13.37 K/uL *H* (04-20-24 @ 06:33)        PHYSICAL EXAM  LE Focused:    Vasc: DP & PT palpable right foot. Left foot PT palpable, DP non palpable 2/2 edema. CFT <3 seconds to all 10 digits b/l. TG warm to warm left foot.   Derm: Post op: Medial left foot incision left open, surgical site appears erytheamtous, with base granualr. Dorsal incision extended approximaltely 3 cm, Wound base granular. No further purulence noted. Left foot increased ecchymosis noted to the dorsal aspect of the foot. Erythema, and edema noted to the left foot. No signs of any open lesions noted. Increased temperature noted to the dorsum of the left foot. No fluctuance noted over the dorsal aspect. No soft tissue crepitus noted. combined total of 12 cc or purulent drainage has been obtained from dorsal foot wound. Further purlent drainage was expressed from the wound 4/14 approx 2 cc.   Neuro: Gross and light touch sensation intact b/l  MSK: PTP to the left foot       IMAGING: ?xray    < from: US Duplex Venous Lower Ext Ltd, Left (04.11.24 @ 15:49) >  PROCEDURE DATE:  04/11/2024          INTERPRETATION:  CLINICAL INFORMATION: Left foot pain    COMPARISON: None available.    TECHNIQUE: Duplex sonography of the LEFT LOWER extremity veins with color   and spectral Doppler, with and without compression.    FINDINGS:    There is normal compressibility of the left common femoral, femoral and   popliteal veins.  The contralateral common femoral vein is patent.  Doppler examination shows normal spontaneous and phasic flow.    No calf vein thrombosis is detected.    IMPRESSION:  No evidence of left lower extremity deep venous thrombosis.            < end of copied text >  < from: Xray Foot AP + Lateral + Oblique, Left (04.11.24 @ 15:29) >      < end of copied text >  < from: Xray Foot AP + Lateral + Oblique, Left (04.11.24 @ 15:29) >      INTERPRETATION:  Bilateral ankles and left foot. Patient has local pain   and swelling.    COMPARISON: None available.    Left ankle. 3 views.    Arterial calcifications are noted.    There are some calcifications starting posterior to the posterior   calcaneus proceeding superiorly. There is mild degeneration of the   malleolar tips. No fracture.    Right ankle. 3 views.    There has been talar calcaneal fusion and also fusion of the lower fibula   with these 2 bones. Orthopedic hardware is seen over the lower medial   tibial area.    There is an inferior calcaneal spur.    No bone destruction or acute fracture.    Left foot. 3 views.    There is mild first MTP degeneration and mild swelling of the dorsum of   the foot. No fracture.    IMPRESSION: No acute fracture. Mild swelling dorsum left foot. Old fusion   of the right ankle joint with some orthopedic hardware.    --- End of Report ---    < end of copied text >      < from: CT Foot w/ IV Cont, Left (04.11.24 @ 19:27) >    INTERPRETATION:  Exam Type: CT FOOT WITH IV CONTRAST LEFT  Exam Date and Time: 4/11/2024 7:27 PM  Indication: Left foot pain. Evaluate forabscess.  Comparison: Same-day foot and ankle x-rays.    TECHNIQUE:  Multiplanar CT images of the left foot were obtained after administration   of 90 cc of Omnipaque 350 (10 cc discarded).    FINDINGS:  Evaluation of the left foot demonstrate juxtaarticular erosive changes at   the tibiotalar joint asymmetric to the distal lateral margin of the   tibia. Extensive mineralization is identified within the tibiotalar   joint. Similar findings of mineralization is seen about the first MTP   joint as well as at some of the TMT articulations. However, discrete   osseous erosions are not identified at these locations. There is no   osseous destruction or aggressive periosteal reaction identified. No   fracture or dislocation.    There is degenerative arthrosis involving the naviculocuneiform   articulation. No advanced productive changes are seen across the Lisfranc   interval.    Within the soft tissues, there is diffuse swelling identified along the   dorsal foot. There is fluid about the first TMT joint which decompresses   along the dorsal surface of the midfoot/forefoot. This fluid collection   along the dorsal midfoot/forefoot measures approximately 2.5 cm in the AP   dimension and approximately 2.9 cm in the transverse dimension. There is   peripheral enhancement with lack of central enhancement.    Mineralization is seen involving the distal insertional fibers of the   Achilles tendon as well as within the posterior tibialis tendon.   Otherwise, the imaged tendons are grossly intact.      IMPRESSION:  1.  Findings of gouty arthropathy about the foot as described. No CT   findings to suggest osteomyelitis.  2.  Peripheral enhancing fluid collection seen about the dorsal foot   about the first TMT joint as described is also felt to related to the   gouty arthropathy. An abscess is a less likely consideration but cannot   be entirely excluded on this examination.    < end of copied text >      CULTURES:

## 2024-04-21 LAB
ANION GAP SERPL CALC-SCNC: 7 MMOL/L — SIGNIFICANT CHANGE UP (ref 5–17)
BASOPHILS # BLD AUTO: 0.03 K/UL — SIGNIFICANT CHANGE UP (ref 0–0.2)
BASOPHILS NFR BLD AUTO: 0.3 % — SIGNIFICANT CHANGE UP (ref 0–2)
BUN SERPL-MCNC: 19 MG/DL — HIGH (ref 7–18)
CALCIUM SERPL-MCNC: 9 MG/DL — SIGNIFICANT CHANGE UP (ref 8.4–10.5)
CHLORIDE SERPL-SCNC: 111 MMOL/L — HIGH (ref 96–108)
CO2 SERPL-SCNC: 23 MMOL/L — SIGNIFICANT CHANGE UP (ref 22–31)
CREAT SERPL-MCNC: 0.72 MG/DL — SIGNIFICANT CHANGE UP (ref 0.5–1.3)
EGFR: 97 ML/MIN/1.73M2 — SIGNIFICANT CHANGE UP
EOSINOPHIL # BLD AUTO: 0.25 K/UL — SIGNIFICANT CHANGE UP (ref 0–0.5)
EOSINOPHIL NFR BLD AUTO: 2.6 % — SIGNIFICANT CHANGE UP (ref 0–6)
GLUCOSE BLDC GLUCOMTR-MCNC: 106 MG/DL — HIGH (ref 70–99)
GLUCOSE BLDC GLUCOMTR-MCNC: 114 MG/DL — HIGH (ref 70–99)
GLUCOSE BLDC GLUCOMTR-MCNC: 130 MG/DL — HIGH (ref 70–99)
GLUCOSE BLDC GLUCOMTR-MCNC: 132 MG/DL — HIGH (ref 70–99)
GLUCOSE SERPL-MCNC: 108 MG/DL — HIGH (ref 70–99)
HCT VFR BLD CALC: 40 % — SIGNIFICANT CHANGE UP (ref 39–50)
HGB BLD-MCNC: 12.8 G/DL — LOW (ref 13–17)
IMM GRANULOCYTES NFR BLD AUTO: 0.4 % — SIGNIFICANT CHANGE UP (ref 0–0.9)
LYMPHOCYTES # BLD AUTO: 2.73 K/UL — SIGNIFICANT CHANGE UP (ref 1–3.3)
LYMPHOCYTES # BLD AUTO: 28.2 % — SIGNIFICANT CHANGE UP (ref 13–44)
MCHC RBC-ENTMCNC: 30.5 PG — SIGNIFICANT CHANGE UP (ref 27–34)
MCHC RBC-ENTMCNC: 32 GM/DL — SIGNIFICANT CHANGE UP (ref 32–36)
MCV RBC AUTO: 95.2 FL — SIGNIFICANT CHANGE UP (ref 80–100)
MONOCYTES # BLD AUTO: 0.69 K/UL — SIGNIFICANT CHANGE UP (ref 0–0.9)
MONOCYTES NFR BLD AUTO: 7.1 % — SIGNIFICANT CHANGE UP (ref 2–14)
NEUTROPHILS # BLD AUTO: 5.95 K/UL — SIGNIFICANT CHANGE UP (ref 1.8–7.4)
NEUTROPHILS NFR BLD AUTO: 61.4 % — SIGNIFICANT CHANGE UP (ref 43–77)
NRBC # BLD: 0 /100 WBCS — SIGNIFICANT CHANGE UP (ref 0–0)
PLATELET # BLD AUTO: 498 K/UL — HIGH (ref 150–400)
POTASSIUM SERPL-MCNC: 4.1 MMOL/L — SIGNIFICANT CHANGE UP (ref 3.5–5.3)
POTASSIUM SERPL-SCNC: 4.1 MMOL/L — SIGNIFICANT CHANGE UP (ref 3.5–5.3)
RBC # BLD: 4.2 M/UL — SIGNIFICANT CHANGE UP (ref 4.2–5.8)
RBC # FLD: 12.8 % — SIGNIFICANT CHANGE UP (ref 10.3–14.5)
SODIUM SERPL-SCNC: 141 MMOL/L — SIGNIFICANT CHANGE UP (ref 135–145)
WBC # BLD: 9.69 K/UL — SIGNIFICANT CHANGE UP (ref 3.8–10.5)
WBC # FLD AUTO: 9.69 K/UL — SIGNIFICANT CHANGE UP (ref 3.8–10.5)

## 2024-04-21 PROCEDURE — 99232 SBSQ HOSP IP/OBS MODERATE 35: CPT

## 2024-04-21 RX ADMIN — AMPICILLIN SODIUM AND SULBACTAM SODIUM 200 GRAM(S): 250; 125 INJECTION, POWDER, FOR SUSPENSION INTRAMUSCULAR; INTRAVENOUS at 06:03

## 2024-04-21 RX ADMIN — AMPICILLIN SODIUM AND SULBACTAM SODIUM 200 GRAM(S): 250; 125 INJECTION, POWDER, FOR SUSPENSION INTRAMUSCULAR; INTRAVENOUS at 12:20

## 2024-04-21 RX ADMIN — AMPICILLIN SODIUM AND SULBACTAM SODIUM 200 GRAM(S): 250; 125 INJECTION, POWDER, FOR SUSPENSION INTRAMUSCULAR; INTRAVENOUS at 17:31

## 2024-04-21 RX ADMIN — AMPICILLIN SODIUM AND SULBACTAM SODIUM 200 GRAM(S): 250; 125 INJECTION, POWDER, FOR SUSPENSION INTRAMUSCULAR; INTRAVENOUS at 23:36

## 2024-04-21 NOTE — PROGRESS NOTE ADULT - SUBJECTIVE AND OBJECTIVE BOX
INTERVAL HPI/OVERNIGHT EVENTS:   Seen and examined with  Jose Cruz [id number].  Has some throbbing in his left foot    REVIEW OF SYSTEMS:  Left foot pain no fever or chills    Vital Signs Last 24 Hrs  T(C): 37.1 (21 Apr 2024 13:17), Max: 37.1 (21 Apr 2024 13:17)  T(F): 98.7 (21 Apr 2024 13:17), Max: 98.7 (21 Apr 2024 13:17)  HR: 97 (21 Apr 2024 13:17) (69 - 97)  BP: 110/71 (21 Apr 2024 13:17) (100/58 - 110/71)  BP(mean): --  RR: 18 (21 Apr 2024 13:17) (17 - 18)  SpO2: 97% (21 Apr 2024 13:17) (95% - 97%)    Parameters below as of 21 Apr 2024 13:17  Patient On (Oxygen Delivery Method): room air        PHYSICAL EXAMINATION:  left leg wrapped still some pain with flexion and extension, heart regular, lungs clear                        12.8   9.69  )-----------( 498      ( 21 Apr 2024 06:40 )             40.0     04-21    141  |  111<H>  |  19<H>  ----------------------------<  108<H>  4.1   |  23  |  0.72    Ca    9.0      21 Apr 2024 06:40                CAPILLARY BLOOD GLUCOSE      RADIOLOGY & ADDITIONAL TESTS:

## 2024-04-21 NOTE — PROGRESS NOTE ADULT - ASSESSMENT
A:  Left foot infected foot  s/p I &D left foot 4/18    P:   Patient evaluated and Chart reviewed   Discussed diagnosis and treatment with patient  X-rays evaluated, results as noted above  CT results reviewed   Cx results noted above.  Continue with IV antibiotics As Per ID  Reviewed MRI, see above noted  Wound was dressed with adaptic, dsd  S/p Left foot I &D, approx 10 cc of purulent drainage was expressed from left foot. No further purulence noted on exam today. Incision site left open with packing to allow for drainage.   Plan for possible bedside primary closure vs outpatient primary closure in clinic. Pending intraop cx and further evaluation of left foot surgical sites.   Patient can be weight bearing as tolerated to the HEEL    Podiatry to follow while in house.  Seen, reviewed, and treated with Dr. Urena  Discussed with Attending Dr. Valencia     WCO: Betadine, 4x4 gauze, abd, ,jorge ace bandage to be changed every other day  A:  Left foot infected foot  s/p I &D left foot 4/18    P:   Patient evaluated and Chart reviewed   Discussed diagnosis and treatment with patient  X-rays evaluated, results as noted above  CT results reviewed   Cx results noted above.  Please get repeat blood cultures   Continue with IV antibiotics As Per ID  Reviewed MRI, see above noted  Wound was dressed with adaptic, dsd  S/p Left foot I &D, approx 10 cc of purulent drainage was expressed from left foot. No further purulence noted on exam today. Incision site left open with packing to allow for drainage.   Plan for possible bedside primary closure vs outpatient primary closure in clinic. Pending intraop cx and further evaluation of left foot surgical sites.   Patient can be weight bearing as tolerated to the HEEL    Podiatry to follow while in house.  Seen, reviewed, and treated with Dr. Urena  Discussed with Attending Dr. Valencia     WCO: Betadine, 4x4 gauze, abd, ,jorge ace bandage to be changed every other day

## 2024-04-21 NOTE — PROGRESS NOTE ADULT - SUBJECTIVE AND OBJECTIVE BOX
Interval: Patient was seen resting bedside. POD#3 S/p L foot I&D. Patient admits to pain to the left foot, likely 2/2 surgical incisions. No purulent drainage noted on expression of the wound. Denies any other pedal complaints at this time.     Patient is a 73y old  Male who presents with a chief complaint of left foot pain    HPI:73-year-old male with past medical history hyperlipidemia, prediabetes, SLE on hydroxychloroquine presents with left foot pain over the past 4 days.  Patient states he stepped on a stone 4 days ago, states shortly afterwards he start experiencing left foot redness, swelling, pain, fevers, and chills.  Patient report taking ibuprofen with little relief.  Patient seen by primary care doctor today, advised to the emergency department further evaluation.  Denies any nasal congestion, cough, chest pain, shortness of breath, abdominal pain, vomiting, numbness, or weakness.  Denies any additional complaints.      Podiatry HPI: 73-year-old male with past medical history hyperlipidemia, prediabetes, SLE on hydroxychloroquine presents with left foot pain over the past 4 days. Patient states that one monday he was walking and stepped on a stone. Patient states later that day he began to develop pain and fevers and chills. Patient states that this entire week his foot has become increasingly swollen and he has been unable to walk. Patient states he has had fevers and chills all week long. Patient went to his PCP who gave him pain meds and recommened he come to ED for further workup.     Medications acetaminophen     Tablet .. 650 milliGRAM(s) Oral every 6 hours PRN  ampicillin/sulbactam  IVPB 3 Gram(s) IV Intermittent every 6 hours  ampicillin/sulbactam  IVPB      insulin lispro (ADMELOG) corrective regimen sliding scale   SubCutaneous at bedtime  insulin lispro (ADMELOG) corrective regimen sliding scale   SubCutaneous three times a day before meals  lactated ringers. 1000 milliLiter(s) IV Continuous <Continuous>  losartan 25 milliGRAM(s) Oral daily  ondansetron Injectable 4 milliGRAM(s) IV Push every 8 hours PRN    FH,   PMHOsteoarthritis    Osteoarthritis of left knee    High cholesterol       PSHNo significant past surgical history    H/O foot surgery        Labs                          12.8   9.69  )-----------( 498      ( 21 Apr 2024 06:40 )             40.0      04-21    141  |  111<H>  |  19<H>  ----------------------------<  108<H>  4.1   |  23  |  0.72    Ca    9.0      21 Apr 2024 06:40       Vital Signs Last 24 Hrs  T(C): 36.8 (21 Apr 2024 05:56), Max: 36.8 (20 Apr 2024 20:59)  T(F): 98.3 (21 Apr 2024 05:56), Max: 98.3 (20 Apr 2024 20:59)  HR: 69 (21 Apr 2024 05:56) (69 - 76)  BP: 100/58 (21 Apr 2024 05:56) (100/58 - 113/67)  BP(mean): --  RR: 18 (21 Apr 2024 05:56) (17 - 18)  SpO2: 97% (21 Apr 2024 05:56) (94% - 97%)    Parameters below as of 21 Apr 2024 05:56  Patient On (Oxygen Delivery Method): room air      Sedimentation Rate, Erythrocyte: 71 mm/Hr (04-20-24 @ 06:33)  Sedimentation Rate, Erythrocyte: 71 mm/Hr (04-18-24 @ 05:41)         C-Reactive Protein, Serum: 24 mg/L (04-20-24 @ 06:33)  C-Reactive Protein, Serum: 28 mg/L (04-18-24 @ 05:41)  C-Reactive Protein, Serum: 106 mg/L (04-14-24 @ 05:41)  C-Reactive Protein, Serum: 279 mg/L (04-11-24 @ 14:45)   WBC Count: 9.69 K/uL (04-21-24 @ 06:40)      PHYSICAL EXAM  LE Focused:    Vasc: DP & PT palpable right foot. Left foot PT palpable, DP non palpable 2/2 edema. CFT <3 seconds to all 10 digits b/l. TG warm to warm left foot.   Derm: Post op: Medial left foot incision left open, surgical site appears erytheamtous, with base granualr. Dorsal incision extended approximaltely 3 cm, Wound base granular. No further purulence noted. Left foot increased ecchymosis noted to the dorsal aspect of the foot. Erythema, and edema noted to the left foot. No signs of any open lesions noted. Increased temperature noted to the dorsum of the left foot. No fluctuance noted over the dorsal aspect. No soft tissue crepitus noted. combined total of 12 cc or purulent drainage has been obtained from dorsal foot wound. Further purlent drainage was expressed from the wound 4/14 approx 2 cc.   Neuro: Gross and light touch sensation intact b/l  MSK: PTP to the left foot       IMAGING: ?xray    < from: US Duplex Venous Lower Ext Ltd, Left (04.11.24 @ 15:49) >  PROCEDURE DATE:  04/11/2024          INTERPRETATION:  CLINICAL INFORMATION: Left foot pain    COMPARISON: None available.    TECHNIQUE: Duplex sonography of the LEFT LOWER extremity veins with color   and spectral Doppler, with and without compression.    FINDINGS:    There is normal compressibility of the left common femoral, femoral and   popliteal veins.  The contralateral common femoral vein is patent.  Doppler examination shows normal spontaneous and phasic flow.    No calf vein thrombosis is detected.    IMPRESSION:  No evidence of left lower extremity deep venous thrombosis.            < end of copied text >  < from: Xray Foot AP + Lateral + Oblique, Left (04.11.24 @ 15:29) >      < end of copied text >  < from: Xray Foot AP + Lateral + Oblique, Left (04.11.24 @ 15:29) >      INTERPRETATION:  Bilateral ankles and left foot. Patient has local pain   and swelling.    COMPARISON: None available.    Left ankle. 3 views.    Arterial calcifications are noted.    There are some calcifications starting posterior to the posterior   calcaneus proceeding superiorly. There is mild degeneration of the   malleolar tips. No fracture.    Right ankle. 3 views.    There has been talar calcaneal fusion and also fusion of the lower fibula   with these 2 bones. Orthopedic hardware is seen over the lower medial   tibial area.    There is an inferior calcaneal spur.    No bone destruction or acute fracture.    Left foot. 3 views.    There is mild first MTP degeneration and mild swelling of the dorsum of   the foot. No fracture.    IMPRESSION: No acute fracture. Mild swelling dorsum left foot. Old fusion   of the right ankle joint with some orthopedic hardware.    --- End of Report ---    < end of copied text >      < from: CT Foot w/ IV Cont, Left (04.11.24 @ 19:27) >    INTERPRETATION:  Exam Type: CT FOOT WITH IV CONTRAST LEFT  Exam Date and Time: 4/11/2024 7:27 PM  Indication: Left foot pain. Evaluate forabscess.  Comparison: Same-day foot and ankle x-rays.    TECHNIQUE:  Multiplanar CT images of the left foot were obtained after administration   of 90 cc of Omnipaque 350 (10 cc discarded).    FINDINGS:  Evaluation of the left foot demonstrate juxtaarticular erosive changes at   the tibiotalar joint asymmetric to the distal lateral margin of the   tibia. Extensive mineralization is identified within the tibiotalar   joint. Similar findings of mineralization is seen about the first MTP   joint as well as at some of the TMT articulations. However, discrete   osseous erosions are not identified at these locations. There is no   osseous destruction or aggressive periosteal reaction identified. No   fracture or dislocation.    There is degenerative arthrosis involving the naviculocuneiform   articulation. No advanced productive changes are seen across the Lisfranc   interval.    Within the soft tissues, there is diffuse swelling identified along the   dorsal foot. There is fluid about the first TMT joint which decompresses   along the dorsal surface of the midfoot/forefoot. This fluid collection   along the dorsal midfoot/forefoot measures approximately 2.5 cm in the AP   dimension and approximately 2.9 cm in the transverse dimension. There is   peripheral enhancement with lack of central enhancement.    Mineralization is seen involving the distal insertional fibers of the   Achilles tendon as well as within the posterior tibialis tendon.   Otherwise, the imaged tendons are grossly intact.      IMPRESSION:  1.  Findings of gouty arthropathy about the foot as described. No CT   findings to suggest osteomyelitis.  2.  Peripheral enhancing fluid collection seen about the dorsal foot   about the first TMT joint as described is also felt to related to the   gouty arthropathy. An abscess is a less likely consideration but cannot   be entirely excluded on this examination.    < end of copied text >      CULTURES: Culture Results:   Rare Streptococcus dysgalactiae (Group C/G)   "Susceptibilities not performed" (04.18.24 @ 21:40)

## 2024-04-21 NOTE — PROGRESS NOTE ADULT - ASSESSMENT
#Sepsis secondary to soft tissue infection  # Left foot cellulitis w/ abscess  # Gram Positive Bacteremia(likely contamination)  #Gouty arthritis  #SLE  #preDM  #HLD    s/p I+D 4/18, needs wound closure, inpatient vs outpatient  Prediabetes needs more effective lifestyle modifications, AM sugars appears well controlled, added sliding scale for better infectious control  Hydroxychloroquine was discontinued  Uric Acid level controlled  Home losartan resumed  Will start anti-inflammatories if needed after OR procedure by podiatry   #Sepsis secondary to soft tissue infection  # Left foot cellulitis w/ abscess  # Gram Positive Bacteremia(likely contamination)  #Gouty arthritis  #SLE  #preDM  #HLD    s/p I+D 4/18, needs wound closure, inpatient vs outpatient  Podiatry requesting repeat blood cultures?  Prediabetes needs more effective lifestyle modifications, AM sugars appears well controlled, added sliding scale for better infectious control  Hydroxychloroquine was discontinued  Uric Acid level controlled  Home losartan resumed  Will start anti-inflammatories if needed after OR procedure by podiatry

## 2024-04-22 LAB
ANION GAP SERPL CALC-SCNC: 6 MMOL/L — SIGNIFICANT CHANGE UP (ref 5–17)
BASOPHILS # BLD AUTO: 0.03 K/UL — SIGNIFICANT CHANGE UP (ref 0–0.2)
BASOPHILS NFR BLD AUTO: 0.3 % — SIGNIFICANT CHANGE UP (ref 0–2)
BUN SERPL-MCNC: 21 MG/DL — HIGH (ref 7–18)
CALCIUM SERPL-MCNC: 9.2 MG/DL — SIGNIFICANT CHANGE UP (ref 8.4–10.5)
CHLORIDE SERPL-SCNC: 110 MMOL/L — HIGH (ref 96–108)
CO2 SERPL-SCNC: 24 MMOL/L — SIGNIFICANT CHANGE UP (ref 22–31)
CREAT SERPL-MCNC: 0.6 MG/DL — SIGNIFICANT CHANGE UP (ref 0.5–1.3)
EGFR: 103 ML/MIN/1.73M2 — SIGNIFICANT CHANGE UP
EOSINOPHIL # BLD AUTO: 0.28 K/UL — SIGNIFICANT CHANGE UP (ref 0–0.5)
EOSINOPHIL NFR BLD AUTO: 2.4 % — SIGNIFICANT CHANGE UP (ref 0–6)
GLUCOSE BLDC GLUCOMTR-MCNC: 102 MG/DL — HIGH (ref 70–99)
GLUCOSE BLDC GLUCOMTR-MCNC: 111 MG/DL — HIGH (ref 70–99)
GLUCOSE BLDC GLUCOMTR-MCNC: 119 MG/DL — HIGH (ref 70–99)
GLUCOSE BLDC GLUCOMTR-MCNC: 153 MG/DL — HIGH (ref 70–99)
GLUCOSE SERPL-MCNC: 115 MG/DL — HIGH (ref 70–99)
HCT VFR BLD CALC: 40.2 % — SIGNIFICANT CHANGE UP (ref 39–50)
HGB BLD-MCNC: 13 G/DL — SIGNIFICANT CHANGE UP (ref 13–17)
IMM GRANULOCYTES NFR BLD AUTO: 0.5 % — SIGNIFICANT CHANGE UP (ref 0–0.9)
LYMPHOCYTES # BLD AUTO: 1.81 K/UL — SIGNIFICANT CHANGE UP (ref 1–3.3)
LYMPHOCYTES # BLD AUTO: 15.7 % — SIGNIFICANT CHANGE UP (ref 13–44)
MCHC RBC-ENTMCNC: 30.2 PG — SIGNIFICANT CHANGE UP (ref 27–34)
MCHC RBC-ENTMCNC: 32.3 GM/DL — SIGNIFICANT CHANGE UP (ref 32–36)
MCV RBC AUTO: 93.3 FL — SIGNIFICANT CHANGE UP (ref 80–100)
MONOCYTES # BLD AUTO: 0.65 K/UL — SIGNIFICANT CHANGE UP (ref 0–0.9)
MONOCYTES NFR BLD AUTO: 5.6 % — SIGNIFICANT CHANGE UP (ref 2–14)
NEUTROPHILS # BLD AUTO: 8.7 K/UL — HIGH (ref 1.8–7.4)
NEUTROPHILS NFR BLD AUTO: 75.5 % — SIGNIFICANT CHANGE UP (ref 43–77)
NRBC # BLD: 0 /100 WBCS — SIGNIFICANT CHANGE UP (ref 0–0)
PLATELET # BLD AUTO: 475 K/UL — HIGH (ref 150–400)
POTASSIUM SERPL-MCNC: 4 MMOL/L — SIGNIFICANT CHANGE UP (ref 3.5–5.3)
POTASSIUM SERPL-SCNC: 4 MMOL/L — SIGNIFICANT CHANGE UP (ref 3.5–5.3)
RBC # BLD: 4.31 M/UL — SIGNIFICANT CHANGE UP (ref 4.2–5.8)
RBC # FLD: 12.4 % — SIGNIFICANT CHANGE UP (ref 10.3–14.5)
SODIUM SERPL-SCNC: 140 MMOL/L — SIGNIFICANT CHANGE UP (ref 135–145)
WBC # BLD: 11.53 K/UL — HIGH (ref 3.8–10.5)
WBC # FLD AUTO: 11.53 K/UL — HIGH (ref 3.8–10.5)

## 2024-04-22 PROCEDURE — 99232 SBSQ HOSP IP/OBS MODERATE 35: CPT

## 2024-04-22 RX ADMIN — AMPICILLIN SODIUM AND SULBACTAM SODIUM 200 GRAM(S): 250; 125 INJECTION, POWDER, FOR SUSPENSION INTRAMUSCULAR; INTRAVENOUS at 05:34

## 2024-04-22 RX ADMIN — AMPICILLIN SODIUM AND SULBACTAM SODIUM 200 GRAM(S): 250; 125 INJECTION, POWDER, FOR SUSPENSION INTRAMUSCULAR; INTRAVENOUS at 11:13

## 2024-04-22 RX ADMIN — Medication 1: at 16:58

## 2024-04-22 RX ADMIN — LOSARTAN POTASSIUM 25 MILLIGRAM(S): 100 TABLET, FILM COATED ORAL at 05:32

## 2024-04-22 RX ADMIN — AMPICILLIN SODIUM AND SULBACTAM SODIUM 200 GRAM(S): 250; 125 INJECTION, POWDER, FOR SUSPENSION INTRAMUSCULAR; INTRAVENOUS at 23:37

## 2024-04-22 RX ADMIN — AMPICILLIN SODIUM AND SULBACTAM SODIUM 200 GRAM(S): 250; 125 INJECTION, POWDER, FOR SUSPENSION INTRAMUSCULAR; INTRAVENOUS at 17:33

## 2024-04-22 NOTE — PROGRESS NOTE ADULT - PROBLEM SELECTOR PLAN 2
-bcx 4/11- staph epi mrsa, staph hommis ( likely contaminated)  -bcx 4/13- NGTD  -bcx 4/21 pending   -ID Nasreen julio

## 2024-04-22 NOTE — PROGRESS NOTE ADULT - ASSESSMENT
Patient is a 73 year old Male from home with PMHx of HLD, prediabetes, SLE on hydroxychloroquine presents with left foot pain over the past 4 days. Admitted to medicine for L foot cellulitis with abscess. ID and podiatry consulted. Pain consulted.   Pt underwent I &D on 4/18.  Incision site left open with packing to allow for drainage per podiatry. pending intra op cx.   Podiatry reccs to keep NONWB until tomorrow 4/20 as patient had recent I &D and incision sites are open.   Following 4/20 Patient can be heel weight bearing left foot as tolerated.   post op PT eval -home PT.     4/22: Podiatry plan in progress: inpatient wound closure vs outpatient wound closure, pending recs

## 2024-04-22 NOTE — PROGRESS NOTE ADULT - SUBJECTIVE AND OBJECTIVE BOX
Patient is a 73y old  Male who presents with a chief complaint of Left foot cellulitis with abscess (19 Apr 2024 12:16)      OVERNIGHT EVENTS: none noted     REVIEW OF SYSTEMS:  CONSTITUTIONAL: No fever, chills  RESPIRATORY: No cough, SOB  CARDIOVASCULAR: No chest pain, palpitations  GASTROINTESTINAL: No abdominal pain. No nausea, vomiting, or diarrhea  GENITOURINARY: No dysuria  NEUROLOGICAL: No HA  SKIN: No itching, burning, rashes, or lesions   MUSCULOSKELETAL: left foot slightly tender    T(C): 36.5 (04-22-24 @ 14:36), Max: 37.1 (04-21-24 @ 20:42)  HR: 71 (04-22-24 @ 14:36) (71 - 82)  BP: 100/52 (04-22-24 @ 14:36) (100/52 - 115/73)  RR: 17 (04-22-24 @ 14:36) (17 - 18)  SpO2: 96% (04-22-24 @ 14:36) (95% - 96%)  Wt(kg): --Vital Signs Last 24 Hrs  T(C): 36.5 (22 Apr 2024 14:36), Max: 37.1 (21 Apr 2024 20:42)  T(F): 97.7 (22 Apr 2024 14:36), Max: 98.8 (21 Apr 2024 20:42)  HR: 71 (22 Apr 2024 14:36) (71 - 82)  BP: 100/52 (22 Apr 2024 14:36) (100/52 - 115/73)  BP(mean): 87 (22 Apr 2024 04:50) (87 - 87)  RR: 17 (22 Apr 2024 14:36) (17 - 18)  SpO2: 96% (22 Apr 2024 14:36) (95% - 96%)    Parameters below as of 22 Apr 2024 14:36  Patient On (Oxygen Delivery Method): room air          PHYSICAL EXAM:  GENERAL: NAD  CHEST/LUNG: Clear to auscultation bilaterally; No rales, rhonchi, wheezing, or rubs  HEART: S1, S2, Regular rate and rhythm  ABDOMEN: Soft, Nontender, Nondistended; Bowel sounds present  NEURO: Alert & Oriented X3  EXTREMITIES: left foot wrapped in ACE, pulses intact bilateral     Consultant(s) Notes Reviewed:  [x ] YES  [ ] NO  Care Discussed with Consultants/Other Providers [ x] YES  [ ] NO  LABS:                        13.0   11.53 )-----------( 475      ( 22 Apr 2024 07:15 )             40.2     04-22    140  |  110<H>  |  21<H>  ----------------------------<  115<H>  4.0   |  24  |  0.60    Ca    9.2      22 Apr 2024 07:15        CAPILLARY BLOOD GLUCOSE      POCT Blood Glucose.: 102 mg/dL (22 Apr 2024 11:38)  POCT Blood Glucose.: 111 mg/dL (22 Apr 2024 08:12)  POCT Blood Glucose.: 132 mg/dL (21 Apr 2024 20:58)  POCT Blood Glucose.: 130 mg/dL (21 Apr 2024 16:32)      Urinalysis Basic - ( 22 Apr 2024 07:15 )    Color: x / Appearance: x / SG: x / pH: x  Gluc: 115 mg/dL / Ketone: x  / Bili: x / Urobili: x   Blood: x / Protein: x / Nitrite: x   Leuk Esterase: x / RBC: x / WBC x   Sq Epi: x / Non Sq Epi: x / Bacteria: x        RADIOLOGY & ADDITIONAL TESTS:  Imaging Personally Reviewed:  [ ] YES  [ ] NO

## 2024-04-22 NOTE — PROGRESS NOTE ADULT - SUBJECTIVE AND OBJECTIVE BOX
Interval: Patient was seen resting bedside. POD#4 S/p L foot I&D. Patient admits to pain to the left foot, likely 2/2 surgical incisions. No purulent drainage noted on expression of the wound. Denies any other pedal complaints at this time.     Patient is a 73y old  Male who presents with a chief complaint of left foot pain    HPI:73-year-old male with past medical history hyperlipidemia, prediabetes, SLE on hydroxychloroquine presents with left foot pain over the past 4 days.  Patient states he stepped on a stone 4 days ago, states shortly afterwards he start experiencing left foot redness, swelling, pain, fevers, and chills.  Patient report taking ibuprofen with little relief.  Patient seen by primary care doctor today, advised to the emergency department further evaluation.  Denies any nasal congestion, cough, chest pain, shortness of breath, abdominal pain, vomiting, numbness, or weakness.  Denies any additional complaints.      Podiatry HPI: 73-year-old male with past medical history hyperlipidemia, prediabetes, SLE on hydroxychloroquine presents with left foot pain over the past 4 days. Patient states that one monday he was walking and stepped on a stone. Patient states later that day he began to develop pain and fevers and chills. Patient states that this entire week his foot has become increasingly swollen and he has been unable to walk. Patient states he has had fevers and chills all week long. Patient went to his PCP who gave him pain meds and recommened he come to ED for further workup.     Medications acetaminophen     Tablet .. 650 milliGRAM(s) Oral every 6 hours PRN  ampicillin/sulbactam  IVPB      ampicillin/sulbactam  IVPB 3 Gram(s) IV Intermittent every 6 hours  insulin lispro (ADMELOG) corrective regimen sliding scale   SubCutaneous at bedtime  insulin lispro (ADMELOG) corrective regimen sliding scale   SubCutaneous three times a day before meals  lactated ringers. 1000 milliLiter(s) IV Continuous <Continuous>  losartan 25 milliGRAM(s) Oral daily  ondansetron Injectable 4 milliGRAM(s) IV Push every 8 hours PRN    FH,   PMHOsteoarthritis    Osteoarthritis of left knee    High cholesterol       PSHNo significant past surgical history    H/O foot surgery        Labs                          13.0   11.53 )-----------( 475      ( 22 Apr 2024 07:15 )             40.2      04-22    140  |  110<H>  |  21<H>  ----------------------------<  115<H>  4.0   |  24  |  0.60    Ca    9.2      22 Apr 2024 07:15       Vital Signs Last 24 Hrs  T(C): 36.4 (22 Apr 2024 04:50), Max: 37.1 (21 Apr 2024 13:17)  T(F): 97.6 (22 Apr 2024 04:50), Max: 98.8 (21 Apr 2024 20:42)  HR: 81 (22 Apr 2024 04:50) (81 - 97)  BP: 115/73 (22 Apr 2024 04:50) (107/61 - 115/73)  BP(mean): 87 (22 Apr 2024 04:50) (87 - 87)  RR: 17 (22 Apr 2024 04:50) (17 - 18)  SpO2: 96% (22 Apr 2024 04:50) (95% - 97%)    Parameters below as of 22 Apr 2024 04:50  Patient On (Oxygen Delivery Method): room air      Sedimentation Rate, Erythrocyte: 71 mm/Hr (04-20-24 @ 06:33)  Sedimentation Rate, Erythrocyte: 71 mm/Hr (04-18-24 @ 05:41)         C-Reactive Protein, Serum: 24 mg/L (04-20-24 @ 06:33)  C-Reactive Protein, Serum: 28 mg/L (04-18-24 @ 05:41)  C-Reactive Protein, Serum: 106 mg/L (04-14-24 @ 05:41)  C-Reactive Protein, Serum: 279 mg/L (04-11-24 @ 14:45)   WBC Count: 11.53 K/uL *H* (04-22-24 @ 07:15)      PHYSICAL EXAM  LE Focused:    Vasc: DP & PT palpable right foot. Left foot PT palpable, DP non palpable 2/2 edema. CFT <3 seconds to all 10 digits b/l. TG warm to warm left foot.   Derm: Post op: Medial left foot incision left open, surgical site appears erytheamtous, with base granualr. Dorsal incision extended approximaltely 3 cm, Wound base granular. No further purulence noted. Left foot increased ecchymosis noted to the dorsal aspect of the foot. Erythema, and edema noted to the left foot. No signs of any open lesions noted. Increased temperature noted to the dorsum of the left foot. No fluctuance noted over the dorsal aspect. No soft tissue crepitus noted. combined total of 12 cc or purulent drainage has been obtained from dorsal foot wound. Further purlent drainage was expressed from the wound 4/14 approx 2 cc.   Neuro: Gross and light touch sensation intact b/l  MSK: PTP to the left foot       IMAGING: ?xray    < from: US Duplex Venous Lower Ext Ltd, Left (04.11.24 @ 15:49) >  PROCEDURE DATE:  04/11/2024          INTERPRETATION:  CLINICAL INFORMATION: Left foot pain    COMPARISON: None available.    TECHNIQUE: Duplex sonography of the LEFT LOWER extremity veins with color   and spectral Doppler, with and without compression.    FINDINGS:    There is normal compressibility of the left common femoral, femoral and   popliteal veins.  The contralateral common femoral vein is patent.  Doppler examination shows normal spontaneous and phasic flow.    No calf vein thrombosis is detected.    IMPRESSION:  No evidence of left lower extremity deep venous thrombosis.            < end of copied text >  < from: Xray Foot AP + Lateral + Oblique, Left (04.11.24 @ 15:29) >      < end of copied text >  < from: Xray Foot AP + Lateral + Oblique, Left (04.11.24 @ 15:29) >      INTERPRETATION:  Bilateral ankles and left foot. Patient has local pain   and swelling.    COMPARISON: None available.    Left ankle. 3 views.    Arterial calcifications are noted.    There are some calcifications starting posterior to the posterior   calcaneus proceeding superiorly. There is mild degeneration of the   malleolar tips. No fracture.    Right ankle. 3 views.    There has been talar calcaneal fusion and also fusion of the lower fibula   with these 2 bones. Orthopedic hardware is seen over the lower medial   tibial area.    There is an inferior calcaneal spur.    No bone destruction or acute fracture.    Left foot. 3 views.    There is mild first MTP degeneration and mild swelling of the dorsum of   the foot. No fracture.    IMPRESSION: No acute fracture. Mild swelling dorsum left foot. Old fusion   of the right ankle joint with some orthopedic hardware.    --- End of Report ---    < end of copied text >      < from: CT Foot w/ IV Cont, Left (04.11.24 @ 19:27) >    INTERPRETATION:  Exam Type: CT FOOT WITH IV CONTRAST LEFT  Exam Date and Time: 4/11/2024 7:27 PM  Indication: Left foot pain. Evaluate forabscess.  Comparison: Same-day foot and ankle x-rays.    TECHNIQUE:  Multiplanar CT images of the left foot were obtained after administration   of 90 cc of Omnipaque 350 (10 cc discarded).    FINDINGS:  Evaluation of the left foot demonstrate juxtaarticular erosive changes at   the tibiotalar joint asymmetric to the distal lateral margin of the   tibia. Extensive mineralization is identified within the tibiotalar   joint. Similar findings of mineralization is seen about the first MTP   joint as well as at some of the TMT articulations. However, discrete   osseous erosions are not identified at these locations. There is no   osseous destruction or aggressive periosteal reaction identified. No   fracture or dislocation.    There is degenerative arthrosis involving the naviculocuneiform   articulation. No advanced productive changes are seen across the Lisfranc   interval.    Within the soft tissues, there is diffuse swelling identified along the   dorsal foot. There is fluid about the first TMT joint which decompresses   along the dorsal surface of the midfoot/forefoot. This fluid collection   along the dorsal midfoot/forefoot measures approximately 2.5 cm in the AP   dimension and approximately 2.9 cm in the transverse dimension. There is   peripheral enhancement with lack of central enhancement.    Mineralization is seen involving the distal insertional fibers of the   Achilles tendon as well as within the posterior tibialis tendon.   Otherwise, the imaged tendons are grossly intact.      IMPRESSION:  1.  Findings of gouty arthropathy about the foot as described. No CT   findings to suggest osteomyelitis.  2.  Peripheral enhancing fluid collection seen about the dorsal foot   about the first TMT joint as described is also felt to related to the   gouty arthropathy. An abscess is a less likely consideration but cannot   be entirely excluded on this examination.    < end of copied text >      CULTURES: Culture Results:   Rare Streptococcus dysgalactiae (Group C/G)   "Susceptibilities not performed" (04.18.24 @ 21:40)

## 2024-04-22 NOTE — PROGRESS NOTE ADULT - NS ATTEND AMEND GEN_ALL_CORE FT
Seen and examined with wife Saira at bedside. Still has no symptoms. no fever no chills no cehst pain    Physical: NAD, heart regular, lungs celar, abd soft nontender, left foot wrapped    #Sepsis secondary to soft tissue infection  # Left foot cellulitis w/ abscess s/p I+D  # Gram Positive Bacteremia(likely contamination)  #Gouty arthritis  #SLE  #preDM  #HLD    s/p I+D 4/18, podiatry needs to close the wound but they are waiting for healing and for cultures  Podiatry requesting repeat blood cultures- repeat cultures sent this AM4/22, podiatry concerned regarding hardware in the knee  Prediabetes needs more effective lifestyle modifications, AM sugars appears well controlled, added sliding scale for better infectious control  Hydroxychloroquine was discontinued  Uric Acid level controlled  Home losartan resumed

## 2024-04-22 NOTE — PROGRESS NOTE ADULT - PROBLEM SELECTOR PLAN 10
-s/p I&D 4/18, pending intra OP culture  -pending podiatry plan for wound closure on Monday.   -NWB left foot until 4/20 then heel weight bearing left foot as tolerated.   -PT recs home PT  -can switch to PO Augmentin upon DC

## 2024-04-22 NOTE — PROGRESS NOTE ADULT - ASSESSMENT
A:  Left foot infected foot  s/p I &D left foot 4/18    P:   Patient evaluated and Chart reviewed   Discussed diagnosis and treatment with patient  X-rays evaluated, results as noted above  CT results reviewed   Cx results noted above.  Please get repeat blood cultures   Pending blood cx, incisions will most likely be closed outpatient if patient is pending Discharge.   Reviewed cx from I & D intraop 4/18  Pain much improved today   Continue with IV antibiotics As Per ID  Reviewed MRI, see above noted  Wound was dressed with adaptic, dsd  S/p Left foot I &D, approx 10 cc of purulent drainage was expressed from left foot. No further purulence noted on exam today. Incision site left open with packing to allow for drainage.    Patient can be weight bearing as tolerated to the HEEL      Seen, Discussed and treated with Attending Dr. Valencia     WCO: Betadine, 4x4 gauze, abd, ,jorge ace bandage to be changed every other day

## 2024-04-22 NOTE — PROGRESS NOTE ADULT - PROBLEM SELECTOR PLAN 1
- P/W right foot increased ecchymosis on dorsal foot. Erythema, and edema noted to the left foot.   - Xray foot- Mild swelling dorsum left foot. Old fusion of the right ankle joint with some orthopedic hardware.  - MRI left foot- gouty arthropathy about the hindfoot. fluid collection surrounding the first tarsometatarsal joint.     Moderate distal Achilles tendinosis with questionable low-grade interstitial tearing at the insertion. Mild     tenosynovitis. Chronic plantar fasciitis.  -Marlon duplex- Neg for DVT   -c/w unasyn  -I & D 4/18/24  -Incision site left open with packing to allow for drainage per podiatry. pending intra op cx.   -Podiatry reccs to keep NONWB 4/20 as patient had recent I &D and incision sites are open.   Following 4/20 Patient can be heel weight bearing left foot as tolerated.   -pain management   -Podiatry following  -ID Nasreen following, Upon DC may switch to Augmentin 875mgs po bid per reccs.   -WCO: Betadine, 4x4 gauze, abd, ,jorge ace bandage to be changed every other day

## 2024-04-23 LAB
ALBUMIN SERPL ELPH-MCNC: 3 G/DL — LOW (ref 3.5–5)
ALP SERPL-CCNC: 141 U/L — HIGH (ref 40–120)
ALT FLD-CCNC: 37 U/L DA — SIGNIFICANT CHANGE UP (ref 10–60)
ANION GAP SERPL CALC-SCNC: 5 MMOL/L — SIGNIFICANT CHANGE UP (ref 5–17)
AST SERPL-CCNC: 8 U/L — LOW (ref 10–40)
BILIRUB SERPL-MCNC: 0.4 MG/DL — SIGNIFICANT CHANGE UP (ref 0.2–1.2)
BUN SERPL-MCNC: 18 MG/DL — SIGNIFICANT CHANGE UP (ref 7–18)
CALCIUM SERPL-MCNC: 9 MG/DL — SIGNIFICANT CHANGE UP (ref 8.4–10.5)
CHLORIDE SERPL-SCNC: 111 MMOL/L — HIGH (ref 96–108)
CO2 SERPL-SCNC: 26 MMOL/L — SIGNIFICANT CHANGE UP (ref 22–31)
CREAT SERPL-MCNC: 0.7 MG/DL — SIGNIFICANT CHANGE UP (ref 0.5–1.3)
EGFR: 98 ML/MIN/1.73M2 — SIGNIFICANT CHANGE UP
GLUCOSE BLDC GLUCOMTR-MCNC: 105 MG/DL — HIGH (ref 70–99)
GLUCOSE BLDC GLUCOMTR-MCNC: 114 MG/DL — HIGH (ref 70–99)
GLUCOSE BLDC GLUCOMTR-MCNC: 116 MG/DL — HIGH (ref 70–99)
GLUCOSE BLDC GLUCOMTR-MCNC: 129 MG/DL — HIGH (ref 70–99)
GLUCOSE SERPL-MCNC: 112 MG/DL — HIGH (ref 70–99)
HCT VFR BLD CALC: 39.3 % — SIGNIFICANT CHANGE UP (ref 39–50)
HGB BLD-MCNC: 12.6 G/DL — LOW (ref 13–17)
MCHC RBC-ENTMCNC: 30.4 PG — SIGNIFICANT CHANGE UP (ref 27–34)
MCHC RBC-ENTMCNC: 32.1 GM/DL — SIGNIFICANT CHANGE UP (ref 32–36)
MCV RBC AUTO: 94.9 FL — SIGNIFICANT CHANGE UP (ref 80–100)
NRBC # BLD: 0 /100 WBCS — SIGNIFICANT CHANGE UP (ref 0–0)
PLATELET # BLD AUTO: 499 K/UL — HIGH (ref 150–400)
POTASSIUM SERPL-MCNC: 4.3 MMOL/L — SIGNIFICANT CHANGE UP (ref 3.5–5.3)
POTASSIUM SERPL-SCNC: 4.3 MMOL/L — SIGNIFICANT CHANGE UP (ref 3.5–5.3)
PROT SERPL-MCNC: 7 G/DL — SIGNIFICANT CHANGE UP (ref 6–8.3)
RBC # BLD: 4.14 M/UL — LOW (ref 4.2–5.8)
RBC # FLD: 12.6 % — SIGNIFICANT CHANGE UP (ref 10.3–14.5)
SODIUM SERPL-SCNC: 142 MMOL/L — SIGNIFICANT CHANGE UP (ref 135–145)
WBC # BLD: 9.74 K/UL — SIGNIFICANT CHANGE UP (ref 3.8–10.5)
WBC # FLD AUTO: 9.74 K/UL — SIGNIFICANT CHANGE UP (ref 3.8–10.5)

## 2024-04-23 PROCEDURE — 99233 SBSQ HOSP IP/OBS HIGH 50: CPT | Mod: FS

## 2024-04-23 RX ORDER — AMPICILLIN SODIUM AND SULBACTAM SODIUM 250; 125 MG/ML; MG/ML
3 INJECTION, POWDER, FOR SUSPENSION INTRAMUSCULAR; INTRAVENOUS EVERY 6 HOURS
Refills: 0 | Status: DISCONTINUED | OUTPATIENT
Start: 2024-04-23 | End: 2024-04-23

## 2024-04-23 RX ORDER — AMPICILLIN SODIUM AND SULBACTAM SODIUM 250; 125 MG/ML; MG/ML
3 INJECTION, POWDER, FOR SUSPENSION INTRAMUSCULAR; INTRAVENOUS EVERY 6 HOURS
Refills: 0 | Status: DISCONTINUED | OUTPATIENT
Start: 2024-04-23 | End: 2024-04-24

## 2024-04-23 RX ORDER — ENOXAPARIN SODIUM 100 MG/ML
40 INJECTION SUBCUTANEOUS EVERY 24 HOURS
Refills: 0 | Status: DISCONTINUED | OUTPATIENT
Start: 2024-04-23 | End: 2024-04-25

## 2024-04-23 RX ADMIN — AMPICILLIN SODIUM AND SULBACTAM SODIUM 200 GRAM(S): 250; 125 INJECTION, POWDER, FOR SUSPENSION INTRAMUSCULAR; INTRAVENOUS at 05:15

## 2024-04-23 RX ADMIN — ENOXAPARIN SODIUM 40 MILLIGRAM(S): 100 INJECTION SUBCUTANEOUS at 18:16

## 2024-04-23 RX ADMIN — AMPICILLIN SODIUM AND SULBACTAM SODIUM 200 GRAM(S): 250; 125 INJECTION, POWDER, FOR SUSPENSION INTRAMUSCULAR; INTRAVENOUS at 11:47

## 2024-04-23 RX ADMIN — AMPICILLIN SODIUM AND SULBACTAM SODIUM 200 GRAM(S): 250; 125 INJECTION, POWDER, FOR SUSPENSION INTRAMUSCULAR; INTRAVENOUS at 18:16

## 2024-04-23 NOTE — PROGRESS NOTE ADULT - SUBJECTIVE AND OBJECTIVE BOX
Interval: Patient was seen resting bedside. POD#5 S/p L foot I&D. Patient admits to pain to the left foot, likely 2/2 surgical incisions. No purulent drainage noted on expression of the wound. Pending blood cx.   Patient is a 73y old  Male who presents with a chief complaint of left foot pain    HPI:73-year-old male with past medical history hyperlipidemia, prediabetes, SLE on hydroxychloroquine presents with left foot pain over the past 4 days.  Patient states he stepped on a stone 4 days ago, states shortly afterwards he start experiencing left foot redness, swelling, pain, fevers, and chills.  Patient report taking ibuprofen with little relief.  Patient seen by primary care doctor today, advised to the emergency department further evaluation.  Denies any nasal congestion, cough, chest pain, shortness of breath, abdominal pain, vomiting, numbness, or weakness.  Denies any additional complaints.      Podiatry HPI: 73-year-old male with past medical history hyperlipidemia, prediabetes, SLE on hydroxychloroquine presents with left foot pain over the past 4 days. Patient states that one monday he was walking and stepped on a stone. Patient states later that day he began to develop pain and fevers and chills. Patient states that this entire week his foot has become increasingly swollen and he has been unable to walk. Patient states he has had fevers and chills all week long. Patient went to his PCP who gave him pain meds and recommened he come to ED for further workup.     Medications acetaminophen     Tablet .. 650 milliGRAM(s) Oral every 6 hours PRN  ampicillin/sulbactam  IVPB 3 Gram(s) IV Intermittent every 6 hours  ampicillin/sulbactam  IVPB      insulin lispro (ADMELOG) corrective regimen sliding scale   SubCutaneous three times a day before meals  insulin lispro (ADMELOG) corrective regimen sliding scale   SubCutaneous at bedtime  lactated ringers. 1000 milliLiter(s) IV Continuous <Continuous>  losartan 25 milliGRAM(s) Oral daily  ondansetron Injectable 4 milliGRAM(s) IV Push every 8 hours PRN    FH,   PMHOsteoarthritis    Osteoarthritis of left knee    High cholesterol       PSHNo significant past surgical history    H/O foot surgery        Labs                          12.6   9.74  )-----------( 499      ( 23 Apr 2024 06:10 )             39.3      04-23    142  |  111<H>  |  18  ----------------------------<  112<H>  4.3   |  26  |  0.70    Ca    9.0      23 Apr 2024 06:10    TPro  7.0  /  Alb  3.0<L>  /  TBili  0.4  /  DBili  x   /  AST  8<L>  /  ALT  37  /  AlkPhos  141<H>  04-23     Vital Signs Last 24 Hrs  T(C): 36.6 (23 Apr 2024 05:23), Max: 36.9 (22 Apr 2024 20:48)  T(F): 97.9 (23 Apr 2024 05:23), Max: 98.4 (22 Apr 2024 20:48)  HR: 81 (23 Apr 2024 05:23) (71 - 81)  BP: 104/67 (23 Apr 2024 05:23) (100/52 - 116/67)  BP(mean): 83 (22 Apr 2024 20:48) (83 - 83)  RR: 17 (23 Apr 2024 05:23) (17 - 18)  SpO2: 95% (23 Apr 2024 05:23) (95% - 96%)    Parameters below as of 23 Apr 2024 05:23  Patient On (Oxygen Delivery Method): room air      Sedimentation Rate, Erythrocyte: 71 mm/Hr (04-20-24 @ 06:33)  Sedimentation Rate, Erythrocyte: 71 mm/Hr (04-18-24 @ 05:41)         C-Reactive Protein, Serum: 24 mg/L (04-20-24 @ 06:33)  C-Reactive Protein, Serum: 28 mg/L (04-18-24 @ 05:41)  C-Reactive Protein, Serum: 106 mg/L (04-14-24 @ 05:41)  C-Reactive Protein, Serum: 279 mg/L (04-11-24 @ 14:45)   WBC Count: 9.74 K/uL (04-23-24 @ 06:10)      PHYSICAL EXAM  LE Focused:    Vasc: DP & PT palpable right foot. Left foot PT palpable, DP non palpable 2/2 edema. CFT <3 seconds to all 10 digits b/l. TG warm to warm left foot.   Derm: Post op: Medial left foot incision left open, surgical site appears erytheamtous, with base granualr. Dorsal incision extended approximaltely 3 cm, Wound base granular. No further purulence noted. Left foot increased ecchymosis noted to the dorsal aspect of the foot. Erythema, and edema noted to the left foot. No signs of any open lesions noted. Increased temperature noted to the dorsum of the left foot. No fluctuance noted over the dorsal aspect. No soft tissue crepitus noted. combined total of 12 cc or purulent drainage has been obtained from dorsal foot wound. Further purlent drainage was expressed from the wound 4/14 approx 2 cc.   Neuro: Gross and light touch sensation intact b/l  MSK: PTP to the left foot       IMAGING: ?xray    < from: US Duplex Venous Lower Ext Ltd, Left (04.11.24 @ 15:49) >  PROCEDURE DATE:  04/11/2024          INTERPRETATION:  CLINICAL INFORMATION: Left foot pain    COMPARISON: None available.    TECHNIQUE: Duplex sonography of the LEFT LOWER extremity veins with color   and spectral Doppler, with and without compression.    FINDINGS:    There is normal compressibility of the left common femoral, femoral and   popliteal veins.  The contralateral common femoral vein is patent.  Doppler examination shows normal spontaneous and phasic flow.    No calf vein thrombosis is detected.    IMPRESSION:  No evidence of left lower extremity deep venous thrombosis.            < end of copied text >  < from: Xray Foot AP + Lateral + Oblique, Left (04.11.24 @ 15:29) >      < end of copied text >  < from: Xray Foot AP + Lateral + Oblique, Left (04.11.24 @ 15:29) >      INTERPRETATION:  Bilateral ankles and left foot. Patient has local pain   and swelling.    COMPARISON: None available.    Left ankle. 3 views.    Arterial calcifications are noted.    There are some calcifications starting posterior to the posterior   calcaneus proceeding superiorly. There is mild degeneration of the   malleolar tips. No fracture.    Right ankle. 3 views.    There has been talar calcaneal fusion and also fusion of the lower fibula   with these 2 bones. Orthopedic hardware is seen over the lower medial   tibial area.    There is an inferior calcaneal spur.    No bone destruction or acute fracture.    Left foot. 3 views.    There is mild first MTP degeneration and mild swelling of the dorsum of   the foot. No fracture.    IMPRESSION: No acute fracture. Mild swelling dorsum left foot. Old fusion   of the right ankle joint with some orthopedic hardware.    --- End of Report ---    < end of copied text >      < from: CT Foot w/ IV Cont, Left (04.11.24 @ 19:27) >    INTERPRETATION:  Exam Type: CT FOOT WITH IV CONTRAST LEFT  Exam Date and Time: 4/11/2024 7:27 PM  Indication: Left foot pain. Evaluate forabscess.  Comparison: Same-day foot and ankle x-rays.    TECHNIQUE:  Multiplanar CT images of the left foot were obtained after administration   of 90 cc of Omnipaque 350 (10 cc discarded).    FINDINGS:  Evaluation of the left foot demonstrate juxtaarticular erosive changes at   the tibiotalar joint asymmetric to the distal lateral margin of the   tibia. Extensive mineralization is identified within the tibiotalar   joint. Similar findings of mineralization is seen about the first MTP   joint as well as at some of the TMT articulations. However, discrete   osseous erosions are not identified at these locations. There is no   osseous destruction or aggressive periosteal reaction identified. No   fracture or dislocation.    There is degenerative arthrosis involving the naviculocuneiform   articulation. No advanced productive changes are seen across the Lisfranc   interval.    Within the soft tissues, there is diffuse swelling identified along the   dorsal foot. There is fluid about the first TMT joint which decompresses   along the dorsal surface of the midfoot/forefoot. This fluid collection   along the dorsal midfoot/forefoot measures approximately 2.5 cm in the AP   dimension and approximately 2.9 cm in the transverse dimension. There is   peripheral enhancement with lack of central enhancement.    Mineralization is seen involving the distal insertional fibers of the   Achilles tendon as well as within the posterior tibialis tendon.   Otherwise, the imaged tendons are grossly intact.      IMPRESSION:  1.  Findings of gouty arthropathy about the foot as described. No CT   findings to suggest osteomyelitis.  2.  Peripheral enhancing fluid collection seen about the dorsal foot   about the first TMT joint as described is also felt to related to the   gouty arthropathy. An abscess is a less likely consideration but cannot   be entirely excluded on this examination.    < end of copied text >      CULTURES: Culture Results:   Rare Streptococcus dysgalactiae (Group C/G)   "Susceptibilities not performed" (04.18.24 @ 21:40)

## 2024-04-23 NOTE — PROGRESS NOTE ADULT - SUBJECTIVE AND OBJECTIVE BOX
Patient is a 73y old  Male who presents with a chief complaint of Left foot cellulitis with abscess (19 Apr 2024 12:16)      OVERNIGHT EVENTS: none noted     REVIEW OF SYSTEMS:  CONSTITUTIONAL: No fever, chills  RESPIRATORY: No cough, SOB  CARDIOVASCULAR: No chest pain, palpitations  GASTROINTESTINAL: No abdominal pain. No nausea, vomiting, or diarrhea  GENITOURINARY: No dysuria  NEUROLOGICAL: No HA    T(C): 36.7 (04-23-24 @ 14:36), Max: 36.9 (04-22-24 @ 20:48)  HR: 67 (04-23-24 @ 14:36) (65 - 81)  BP: 105/67 (04-23-24 @ 14:36) (104/67 - 116/67)  RR: 17 (04-23-24 @ 14:36) (17 - 18)  SpO2: 96% (04-23-24 @ 14:36) (95% - 96%)  Wt(kg): --Vital Signs Last 24 Hrs  T(C): 36.7 (23 Apr 2024 14:36), Max: 36.9 (22 Apr 2024 20:48)  T(F): 98 (23 Apr 2024 14:36), Max: 98.4 (22 Apr 2024 20:48)  HR: 67 (23 Apr 2024 14:36) (65 - 81)  BP: 105/67 (23 Apr 2024 14:36) (104/67 - 116/67)  BP(mean): 81 (23 Apr 2024 14:07) (81 - 83)  RR: 17 (23 Apr 2024 14:36) (17 - 18)  SpO2: 96% (23 Apr 2024 14:36) (95% - 96%)    Parameters below as of 23 Apr 2024 14:36  Patient On (Oxygen Delivery Method): room air          PHYSICAL EXAM:  GENERAL: NAD  EYES: clear conjunctiva; EOMI  ENMT: Moist mucous membranes  NECK: Supple, No JVD, Normal thyroid  CHEST/LUNG: Clear to auscultation bilaterally; No rales, rhonchi, wheezing, or rubs  HEART: S1, S2, Regular rate and rhythm  ABDOMEN: Soft, Nontender, Nondistended; Bowel sounds present  NEURO: Alert & Oriented X3  EXTREMITIES: No LE edema, no calf tenderness  LYMPH: No lymphadenopathy noted  SKIN: No rashes or lesions    Consultant(s) Notes Reviewed:  [x ] YES  [ ] NO  Care Discussed with Consultants/Other Providers [ x] YES  [ ] NO  LABS:                        12.6   9.74  )-----------( 499      ( 23 Apr 2024 06:10 )             39.3     04-23    142  |  111<H>  |  18  ----------------------------<  112<H>  4.3   |  26  |  0.70    Ca    9.0      23 Apr 2024 06:10    TPro  7.0  /  Alb  3.0<L>  /  TBili  0.4  /  DBili  x   /  AST  8<L>  /  ALT  37  /  AlkPhos  141<H>  04-23      CAPILLARY BLOOD GLUCOSE      POCT Blood Glucose.: 129 mg/dL (23 Apr 2024 11:27)  POCT Blood Glucose.: 105 mg/dL (23 Apr 2024 07:55)  POCT Blood Glucose.: 119 mg/dL (22 Apr 2024 21:42)  POCT Blood Glucose.: 153 mg/dL (22 Apr 2024 16:52)      Urinalysis Basic - ( 23 Apr 2024 06:10 )    Color: x / Appearance: x / SG: x / pH: x  Gluc: 112 mg/dL / Ketone: x  / Bili: x / Urobili: x   Blood: x / Protein: x / Nitrite: x   Leuk Esterase: x / RBC: x / WBC x   Sq Epi: x / Non Sq Epi: x / Bacteria: x        RADIOLOGY & ADDITIONAL TESTS:  Imaging Personally Reviewed:  [ ] YES  [ ] NO   Patient is a 73y old  Male who presents with a chief complaint of Left foot cellulitis with abscess (19 Apr 2024 12:16)      OVERNIGHT EVENTS: none noted     REVIEW OF SYSTEMS:  CONSTITUTIONAL: No fever, chills  RESPIRATORY: No cough, SOB  CARDIOVASCULAR: No chest pain, palpitations  GASTROINTESTINAL: No abdominal pain. No nausea, vomiting, or diarrhea  GENITOURINARY: No dysuria  NEUROLOGICAL: No HA    T(C): 36.7 (04-23-24 @ 14:36), Max: 36.9 (04-22-24 @ 20:48)  HR: 67 (04-23-24 @ 14:36) (65 - 81)  BP: 105/67 (04-23-24 @ 14:36) (104/67 - 116/67)  RR: 17 (04-23-24 @ 14:36) (17 - 18)  SpO2: 96% (04-23-24 @ 14:36) (95% - 96%)  Wt(kg): --Vital Signs Last 24 Hrs  T(C): 36.7 (23 Apr 2024 14:36), Max: 36.9 (22 Apr 2024 20:48)  T(F): 98 (23 Apr 2024 14:36), Max: 98.4 (22 Apr 2024 20:48)  HR: 67 (23 Apr 2024 14:36) (65 - 81)  BP: 105/67 (23 Apr 2024 14:36) (104/67 - 116/67)  BP(mean): 81 (23 Apr 2024 14:07) (81 - 83)  RR: 17 (23 Apr 2024 14:36) (17 - 18)  SpO2: 96% (23 Apr 2024 14:36) (95% - 96%)    Parameters below as of 23 Apr 2024 14:36  Patient On (Oxygen Delivery Method): room air    PHYSICAL EXAM:  GENERAL: NAD  CHEST/LUNG: Clear to auscultation bilaterally; No rales, rhonchi, wheezing, or rubs  HEART: S1, S2, Regular rate and rhythm  ABDOMEN: Soft, Nontender, Nondistended; Bowel sounds present  NEURO: Alert & Oriented X3  EXTREMITIES:  left foot wrapped in ACE, no drainage noted, pulses intact bilaterally       Consultant(s) Notes Reviewed:  [x ] YES  [ ] NO  Care Discussed with Consultants/Other Providers [ x] YES  [ ] NO  LABS:                        12.6   9.74  )-----------( 499      ( 23 Apr 2024 06:10 )             39.3     04-23    142  |  111<H>  |  18  ----------------------------<  112<H>  4.3   |  26  |  0.70    Ca    9.0      23 Apr 2024 06:10    TPro  7.0  /  Alb  3.0<L>  /  TBili  0.4  /  DBili  x   /  AST  8<L>  /  ALT  37  /  AlkPhos  141<H>  04-23      CAPILLARY BLOOD GLUCOSE      POCT Blood Glucose.: 129 mg/dL (23 Apr 2024 11:27)  POCT Blood Glucose.: 105 mg/dL (23 Apr 2024 07:55)  POCT Blood Glucose.: 119 mg/dL (22 Apr 2024 21:42)  POCT Blood Glucose.: 153 mg/dL (22 Apr 2024 16:52)      Urinalysis Basic - ( 23 Apr 2024 06:10 )    Color: x / Appearance: x / SG: x / pH: x  Gluc: 112 mg/dL / Ketone: x  / Bili: x / Urobili: x   Blood: x / Protein: x / Nitrite: x   Leuk Esterase: x / RBC: x / WBC x   Sq Epi: x / Non Sq Epi: x / Bacteria: x        RADIOLOGY & ADDITIONAL TESTS:  Imaging Personally Reviewed:  [ ] YES  [ ] NO

## 2024-04-23 NOTE — PROGRESS NOTE ADULT - PROBLEM SELECTOR PLAN 10
-s/p I&D 4/18, pending intra OP culture  -pending podiatry plan for wound closure on Monday.   -NWB left foot until 4/20 then heel weight bearing left foot as tolerated.   -PT recs home PT  -can switch to PO Augmentin upon DC -pending wound closure 4/24  -PT recs home PT

## 2024-04-23 NOTE — PROGRESS NOTE ADULT - PROBLEM/PLAN-10
2017 12:07
DISPLAY PLAN FREE TEXT

## 2024-04-23 NOTE — PROGRESS NOTE ADULT - RESPIRATORY AND THORAX
Completed on different encounter  
Patient returned nurse call regarding results.  Please contact him at [phone].  
negative
negative
details…

## 2024-04-23 NOTE — PROGRESS NOTE ADULT - PROBLEM SELECTOR PLAN 2
-bcx 4/11- staph epi mrsa, staph hommis ( likely contaminated)  -bcx 4/13- NGTD  -bcx 4/21 pending   -ID Nasreen julio -bcx 4/11- staph epi mrsa, staph hommis ( likely contaminated)  -bcx 4/13- NGTD  -bcx 4/21 final pending   -ID Nasreen follows

## 2024-04-23 NOTE — PROGRESS NOTE ADULT - SUBJECTIVE AND OBJECTIVE BOX
73y Male    Meds:  ampicillin/sulbactam  IVPB 3 Gram(s) IV Intermittent every 6 hours    Allergies    No Known Allergies    Intolerances        VITALS:  Vital Signs Last 24 Hrs  T(C): 36.7 (23 Apr 2024 14:36), Max: 36.9 (22 Apr 2024 20:48)  T(F): 98 (23 Apr 2024 14:36), Max: 98.4 (22 Apr 2024 20:48)  HR: 67 (23 Apr 2024 14:36) (65 - 81)  BP: 105/67 (23 Apr 2024 14:36) (104/67 - 116/67)  BP(mean): 81 (23 Apr 2024 14:07) (81 - 83)  RR: 17 (23 Apr 2024 14:36) (17 - 18)  SpO2: 96% (23 Apr 2024 14:36) (95% - 96%)    Parameters below as of 23 Apr 2024 14:36  Patient On (Oxygen Delivery Method): room air        LABS/DIAGNOSTIC TESTS:                          12.6   9.74  )-----------( 499      ( 23 Apr 2024 06:10 )             39.3         04-23    142  |  111<H>  |  18  ----------------------------<  112<H>  4.3   |  26  |  0.70    Ca    9.0      23 Apr 2024 06:10    TPro  7.0  /  Alb  3.0<L>  /  TBili  0.4  /  DBili  x   /  AST  8<L>  /  ALT  37  /  AlkPhos  141<H>  04-23      LIVER FUNCTIONS - ( 23 Apr 2024 06:10 )  Alb: 3.0 g/dL / Pro: 7.0 g/dL / ALK PHOS: 141 U/L / ALT: 37 U/L DA / AST: 8 U/L / GGT: x             CULTURES: .Blood Blood  04-22 @ 07:15   No growth at 24 hours  --  --      .Surgical Swab Left foot dorsal culture  04-18 @ 21:40   Rare Streptococcus dysgalactiae (Group C/G)  "Susceptibilities not performed"  --  --      .Blood Blood  04-13 @ 06:00   No growth at 5 days  --  --      .Blood Blood  04-13 @ 05:55   No growth at 5 days  --  --      .Surgical Swab left foot wound  04-12 @ 16:20   Moderate Streptococcus dysgalactiae (Group C/G) "Susceptibilities not  performed"  --  --      .Blood Blood-Peripheral  04-11 @ 14:45   Growth in aerobic and anaerobic bottles: Staphylococcus epidermidis  "Susceptibilities not performed"  Growth in aerobic bottle: Staphylococcus hominis "Susceptibilities not  performed"  Growth in aerobic bottle: Staphylococcus petrasii "Susceptibilities not  performed"  --    Growth in aerobic bottle: Gram Positive Cocci in Clusters  Growth in anaerobic bottle: Gram Positive Cocci in Clusters      .Blood Blood  04-11 @ 14:40   Growth in aerobic and anaerobic bottles: Staphylococcus epidermidis  Growth in aerobic bottle: Staphylococcus hominis  Growth in anaerobic bottle: Coag Negative Staphylococcus Unable to  Identify further "Susceptibilities not performed"  Direct identification is available within approximately 3-5  hours either by Blood Panel Multiplexed PCR or Direct  MALDI-TOF. Details: [email or web address]  --  Blood Culture PCR  Staphylococcus epidermidis  Staphylococcus hominis            RADIOLOGY:      ROS:  [  ] UNABLE TO ELICIT 73y Male who is still in the hospital as he was found to have a deeper pocket of pus in his left foot and so taken to the OR on 4/18/24 and had a formal I&D and has an open wound of his left foot. He has remained on Unasyn as it also grew out Strep C/G only. He has no fevers or chills, no diarrhea and has minimal left foot pain now. His wound has to be closed by podiatry hopefully in the next day or so.    Meds:  ampicillin/sulbactam  IVPB 3 Gram(s) IV Intermittent every 6 hours    Allergies    No Known Allergies    Intolerances        VITALS:  Vital Signs Last 24 Hrs  T(C): 36.7 (23 Apr 2024 14:36), Max: 36.9 (22 Apr 2024 20:48)  T(F): 98 (23 Apr 2024 14:36), Max: 98.4 (22 Apr 2024 20:48)  HR: 67 (23 Apr 2024 14:36) (65 - 81)  BP: 105/67 (23 Apr 2024 14:36) (104/67 - 116/67)  BP(mean): 81 (23 Apr 2024 14:07) (81 - 83)  RR: 17 (23 Apr 2024 14:36) (17 - 18)  SpO2: 96% (23 Apr 2024 14:36) (95% - 96%)    Parameters below as of 23 Apr 2024 14:36  Patient On (Oxygen Delivery Method): room air        LABS/DIAGNOSTIC TESTS:                          12.6   9.74  )-----------( 499      ( 23 Apr 2024 06:10 )             39.3         04-23    142  |  111<H>  |  18  ----------------------------<  112<H>  4.3   |  26  |  0.70    Ca    9.0      23 Apr 2024 06:10    TPro  7.0  /  Alb  3.0<L>  /  TBili  0.4  /  DBili  x   /  AST  8<L>  /  ALT  37  /  AlkPhos  141<H>  04-23      LIVER FUNCTIONS - ( 23 Apr 2024 06:10 )  Alb: 3.0 g/dL / Pro: 7.0 g/dL / ALK PHOS: 141 U/L / ALT: 37 U/L DA / AST: 8 U/L / GGT: x             CULTURES: .Blood Blood  04-22 @ 07:15   No growth at 24 hours  --  --      .Surgical Swab Left foot dorsal culture  04-18 @ 21:40   Rare Streptococcus dysgalactiae (Group C/G)  "Susceptibilities not performed"  --  --      .Blood Blood  04-13 @ 06:00   No growth at 5 days  --  --      .Blood Blood  04-13 @ 05:55   No growth at 5 days  --  --      .Surgical Swab left foot wound  04-12 @ 16:20   Moderate Streptococcus dysgalactiae (Group C/G) "Susceptibilities not  performed"  --  --      .Blood Blood-Peripheral  04-11 @ 14:45   Growth in aerobic and anaerobic bottles: Staphylococcus epidermidis  "Susceptibilities not performed"  Growth in aerobic bottle: Staphylococcus hominis "Susceptibilities not  performed"  Growth in aerobic bottle: Staphylococcus petrasii "Susceptibilities not  performed"  --    Growth in aerobic bottle: Gram Positive Cocci in Clusters  Growth in anaerobic bottle: Gram Positive Cocci in Clusters      .Blood Blood  04-11 @ 14:40   Growth in aerobic and anaerobic bottles: Staphylococcus epidermidis  Growth in aerobic bottle: Staphylococcus hominis  Growth in anaerobic bottle: Coag Negative Staphylococcus Unable to  Identify further "Susceptibilities not performed"  Direct identification is available within approximately 3-5  hours either by Blood Panel Multiplexed PCR or Direct  MALDI-TOF. Details: [email or web address]  --  Blood Culture PCR  Staphylococcus epidermidis  Staphylococcus hominis            RADIOLOGY:      ROS:  [  ] UNABLE TO ELICIT

## 2024-04-23 NOTE — PROGRESS NOTE ADULT - ASSESSMENT
Patient is a 73 year old Male from home with PMHx of HLD, prediabetes, SLE on hydroxychloroquine presents with left foot pain over the past 4 days. Admitted to medicine for L foot cellulitis with abscess. ID and podiatry consulted. Pain consulted.   Pt underwent I &D on 4/18.  Incision site left open with packing to allow for drainage per podiatry. pending intra op cx.   Podiatry reccs to keep NONWB until tomorrow 4/20 as patient had recent I &D and incision sites are open.   Following 4/20 Patient can be heel weight bearing left foot as tolerated.   post op PT eval -home PT.     4/22: Podiatry plan in progress: inpatient wound closure vs outpatient wound closure, pending recs   4/23: Podiatry to close wound inhouse, discharge planning with home PT, CNM following  Patient is a 73 year old Male from home with PMHx of HLD, prediabetes, SLE on hydroxychloroquine presents with left foot pain over the past 4 days. Admitted to medicine for L foot cellulitis with abscess. ID and podiatry consulted. Pain consulted.   Pt underwent I &D on 4/18.  Incision site left open with packing to allow for drainage per podiatry. pending intra op cx.   Podiatry reccs to keep NONWB until tomorrow 4/20 as patient had recent I &D and incision sites are open.   Following 4/20 Patient can be heel weight bearing left foot as tolerated.   post op PT eval -home PT.     4/22: Podiatry plan in progress: inpatient wound closure vs outpatient wound closure, pending recs   4/23: Podiatry to close wound inhouse, discharge planning with home PT, CNM following. Last day of antibiotics Patient is a 73 year old Male from home with PMHx of HLD, prediabetes, SLE on hydroxychloroquine presents with left foot pain over the past 4 days. Admitted to medicine for L foot cellulitis with abscess. ID and podiatry consulted. Pain consulted.   Pt underwent I &D on 4/18.  Incision site left open with packing to allow for drainage per podiatry. pending intra op cx.   Podiatry reccs to keep NONWB until tomorrow 4/20 as patient had recent I &D and incision sites are open.   Following 4/20 Patient can be heel weight bearing left foot as tolerated.   post op PT eval -home PT.     4/22: Podiatry plan in progress: inpatient wound closure vs outpatient wound closure, pending recs   4/23: Podiatry to close wound inhouse 4/24, discharge planning with home PT, CNM following. Abx until 4/24

## 2024-04-23 NOTE — PROGRESS NOTE ADULT - NS ATTEND AMEND GEN_ALL_CORE FT
#Sepsis secondary to soft tissue infection  #Left foot cellulitis w/ abscess s/p I+D  #Gram Positive Bacteremia(likely contamination)  #Gouty arthritis  #SLE  #preDM  #HLD  s/p I+D 4/18, podiatry needs to close the wound but they are waiting for healing. Repeat cultures NGTD.  Podiatry, plan discussed - incision sites to be closed today   - Weight bearing as tolerated to heal  - Will finalize ID plan with Dr. Downey but likely has completed abx course after today  Prediabetes needs more effective lifestyle modifications, AM sugars appears well controlled, added sliding scale for better infectious control  -Uric Acid level controlled  -Home losartan resumed  - PT rec Home PT.

## 2024-04-23 NOTE — PROGRESS NOTE ADULT - ASSESSMENT
A:  Left foot infected foot  s/p I &D left foot 4/18    P:   Patient evaluated and Chart reviewed   Discussed diagnosis and treatment with patient  X-rays evaluated, results as noted above  CT results reviewed   Cx results noted above.  Pending blood cx   Reviewed cx from I & D intraop 4/18  Pain much improved today   Incision sites to be closed , mild serousanginous drainage noted  Continue with IV antibiotics As Per ID  Reviewed MRI, see above noted  Wound was dressed with adaptic, dsd  S/p Left foot I &D, approx 10 cc of purulent drainage was expressed from left foot. No further purulence noted on exam today. Incision site left open with packing to allow for drainage.    Patient can be weight bearing as tolerated to the HEEL      Discussed with Dr. Valencia     WCO: Betadine, 4x4 gauze, abd, ,jorge ace bandage to be changed every other day

## 2024-04-23 NOTE — PROGRESS NOTE ADULT - ASSESSMENT
Left Foot Cellulitis / Abscess - s/ p second I&D in the OR on 4/18.  Fevers - resolved  Leukocytosis - resolved  Bacteremia -  contaminant    Plan -Cont  Unasyn 3gms iv q6hrs till tomorrow am.  plan to DC home after wound closure  Will switch to augmentin 875mgs po bid x 7 days more from tomorrow am.  reconsult prn.

## 2024-04-23 NOTE — PROGRESS NOTE ADULT - PROBLEM SELECTOR PLAN 1
- Xray foot- Mild swelling dorsum left foot. Old fusion of the right ankle joint with some orthopedic hardware.  - MRI left foot- gouty arthropathy about the hindfoot. fluid collection surrounding the first tarsometatarsal joint.     Moderate distal Achilles tendinosis with questionable low-grade interstitial tearing at the insertion. Mild     tenosynovitis. Chronic plantar fasciitis.  -Marlon duplex- Neg for DVT   -c/w unasyn  -I & D 4/18/24  -Incision site left open with packing to allow for drainage per podiatry. pending intra op cx.   -Podiatry reccs to keep NONWB 4/20 as patient had recent I &D and incision sites are open.   -Podiatry following  -ID Nasreen following - Xray foot- Mild swelling dorsum left foot. Old fusion of the right ankle joint with some orthopedic hardware.  - MRI left foot- gouty arthropathy about the hindfoot. fluid collection surrounding the first tarsometatarsal joint.     Moderate distal Achilles tendinosis with questionable low-grade interstitial tearing at the insertion. Mild     tenosynovitis. Chronic plantar fasciitis.  -Marlon duplex- Neg for DVT   -c/w unasyn until 4/24  -I & D 4/18/24  -Incision site left open with packing to allow for drainage per podiatry. pending intra op cx.   -Podiatry reccs to keep NONWB 4/20 as patient had recent I &D and incision sites are open.   -Podiatry following  -ID Nasreen following

## 2024-04-24 LAB
ANION GAP SERPL CALC-SCNC: 5 MMOL/L — SIGNIFICANT CHANGE UP (ref 5–17)
BUN SERPL-MCNC: 20 MG/DL — HIGH (ref 7–18)
CALCIUM SERPL-MCNC: 8.9 MG/DL — SIGNIFICANT CHANGE UP (ref 8.4–10.5)
CHLORIDE SERPL-SCNC: 110 MMOL/L — HIGH (ref 96–108)
CO2 SERPL-SCNC: 25 MMOL/L — SIGNIFICANT CHANGE UP (ref 22–31)
CREAT SERPL-MCNC: 0.68 MG/DL — SIGNIFICANT CHANGE UP (ref 0.5–1.3)
CULTURE RESULTS: ABNORMAL
CULTURE RESULTS: ABNORMAL
EGFR: 99 ML/MIN/1.73M2 — SIGNIFICANT CHANGE UP
GLUCOSE BLDC GLUCOMTR-MCNC: 100 MG/DL — HIGH (ref 70–99)
GLUCOSE BLDC GLUCOMTR-MCNC: 109 MG/DL — HIGH (ref 70–99)
GLUCOSE BLDC GLUCOMTR-MCNC: 118 MG/DL — HIGH (ref 70–99)
GLUCOSE BLDC GLUCOMTR-MCNC: 121 MG/DL — HIGH (ref 70–99)
GLUCOSE SERPL-MCNC: 110 MG/DL — HIGH (ref 70–99)
HCT VFR BLD CALC: 37.5 % — LOW (ref 39–50)
HGB BLD-MCNC: 12.2 G/DL — LOW (ref 13–17)
MCHC RBC-ENTMCNC: 30.1 PG — SIGNIFICANT CHANGE UP (ref 27–34)
MCHC RBC-ENTMCNC: 32.5 GM/DL — SIGNIFICANT CHANGE UP (ref 32–36)
MCV RBC AUTO: 92.6 FL — SIGNIFICANT CHANGE UP (ref 80–100)
NRBC # BLD: 0 /100 WBCS — SIGNIFICANT CHANGE UP (ref 0–0)
PLATELET # BLD AUTO: 511 K/UL — HIGH (ref 150–400)
POTASSIUM SERPL-MCNC: 4.2 MMOL/L — SIGNIFICANT CHANGE UP (ref 3.5–5.3)
POTASSIUM SERPL-SCNC: 4.2 MMOL/L — SIGNIFICANT CHANGE UP (ref 3.5–5.3)
RBC # BLD: 4.05 M/UL — LOW (ref 4.2–5.8)
RBC # FLD: 12.7 % — SIGNIFICANT CHANGE UP (ref 10.3–14.5)
SODIUM SERPL-SCNC: 140 MMOL/L — SIGNIFICANT CHANGE UP (ref 135–145)
SPECIMEN SOURCE: SIGNIFICANT CHANGE UP
SPECIMEN SOURCE: SIGNIFICANT CHANGE UP
WBC # BLD: 8.99 K/UL — SIGNIFICANT CHANGE UP (ref 3.8–10.5)
WBC # FLD AUTO: 8.99 K/UL — SIGNIFICANT CHANGE UP (ref 3.8–10.5)

## 2024-04-24 PROCEDURE — 99233 SBSQ HOSP IP/OBS HIGH 50: CPT | Mod: FS

## 2024-04-24 RX ADMIN — AMPICILLIN SODIUM AND SULBACTAM SODIUM 200 GRAM(S): 250; 125 INJECTION, POWDER, FOR SUSPENSION INTRAMUSCULAR; INTRAVENOUS at 06:38

## 2024-04-24 RX ADMIN — ENOXAPARIN SODIUM 40 MILLIGRAM(S): 100 INJECTION SUBCUTANEOUS at 18:04

## 2024-04-24 RX ADMIN — Medication 1 TABLET(S): at 18:05

## 2024-04-24 RX ADMIN — LOSARTAN POTASSIUM 25 MILLIGRAM(S): 100 TABLET, FILM COATED ORAL at 06:38

## 2024-04-24 RX ADMIN — Medication 650 MILLIGRAM(S): at 18:59

## 2024-04-24 RX ADMIN — AMPICILLIN SODIUM AND SULBACTAM SODIUM 200 GRAM(S): 250; 125 INJECTION, POWDER, FOR SUSPENSION INTRAMUSCULAR; INTRAVENOUS at 00:22

## 2024-04-24 RX ADMIN — Medication 650 MILLIGRAM(S): at 18:05

## 2024-04-24 NOTE — PROGRESS NOTE ADULT - ATTENDING COMMENTS
Seen at bedside.  Written consent was obtained with witness present. Time out performed for the delayed primary closure of I+D site x 2. The left foot was sterily prepped using alcohol pads and 20cc of 1% Lidocaine plain was injected in a v block fashion around the wounds. Using 3-0 nylon the dorsal incision measuring 4.5 cm long was sutured together in a simple suture pattern. The medial incision which measured 5.5 cm long was sutured together in a simple suture/ horizontal mattress pattern. The patient tolerated the procedure well. Minimal blood loss. Dressings including Adaptic, betadine, 4x4 gauze, ABD, jorge, Ace bandage. Patient is to leave dressings CDI.  I was present for and performed the above procedure.

## 2024-04-24 NOTE — PROGRESS NOTE ADULT - PROBLEM SELECTOR PLAN 1
- Xray foot- Mild swelling dorsum left foot. Old fusion of the right ankle joint with some orthopedic hardware.  - MRI left foot- gouty arthropathy about the hindfoot. fluid collection surrounding the first tarsometatarsal joint.     Moderate distal Achilles tendinosis with questionable low-grade interstitial tearing at the insertion. Mild     tenosynovitis. Chronic plantar fasciitis.  -Marlon duplex- Neg for DVT   -c/w unasyn until 4/24  -I & D 4/18/24  -Incision site left open with packing to allow for drainage per podiatry. pending intra op cx.   -Podiatry reccs to keep NONWB 4/20 as patient had recent I &D and incision sites are open.   -Bedside wound closure by podiatry 4/24  -ID Nasreen following  -Augmentin x 7days per ID reccs  -PT recc home PT

## 2024-04-24 NOTE — PROGRESS NOTE ADULT - SUBJECTIVE AND OBJECTIVE BOX
Interval: Patient was seen resting bedside. POD#6 S/p 4/18 L foot I&D. Patient admits to pain to the left foot, likely 2/2 surgical incisions. No purulent drainage noted on expression of the wound. Written consent was obtained with witness present performed bedside closure.      Patient is a 73y old  Male who presents with a chief complaint of left foot pain    HPI:73-year-old male with past medical history hyperlipidemia, prediabetes, SLE on hydroxychloroquine presents with left foot pain over the past 4 days.  Patient states he stepped on a stone 4 days ago, states shortly afterwards he start experiencing left foot redness, swelling, pain, fevers, and chills.  Patient report taking ibuprofen with little relief.  Patient seen by primary care doctor today, advised to the emergency department further evaluation.  Denies any nasal congestion, cough, chest pain, shortness of breath, abdominal pain, vomiting, numbness, or weakness.  Denies any additional complaints.      Podiatry HPI: 73-year-old male with past medical history hyperlipidemia, prediabetes, SLE on hydroxychloroquine presents with left foot pain over the past 4 days. Patient states that one monday he was walking and stepped on a stone. Patient states later that day he began to develop pain and fevers and chills. Patient states that this entire week his foot has become increasingly swollen and he has been unable to walk. Patient states he has had fevers and chills all week long. Patient went to his PCP who gave him pain meds and recommened he come to ED for further workup.     Medications acetaminophen     Tablet .. 650 milliGRAM(s) Oral every 6 hours PRN  amoxicillin  875 milliGRAM(s)/clavulanate 1 Tablet(s) Oral two times a day  enoxaparin Injectable 40 milliGRAM(s) SubCutaneous every 24 hours  insulin lispro (ADMELOG) corrective regimen sliding scale   SubCutaneous at bedtime  insulin lispro (ADMELOG) corrective regimen sliding scale   SubCutaneous three times a day before meals  lactated ringers. 1000 milliLiter(s) IV Continuous <Continuous>  losartan 25 milliGRAM(s) Oral daily  ondansetron Injectable 4 milliGRAM(s) IV Push every 8 hours PRN    FH,   PMHOsteoarthritis    Osteoarthritis of left knee    High cholesterol       PSHNo significant past surgical history    H/O foot surgery        Labs                          12.2   8.99  )-----------( 511      ( 24 Apr 2024 07:00 )             37.5      04-24    140  |  110<H>  |  20<H>  ----------------------------<  110<H>  4.2   |  25  |  0.68    Ca    8.9      24 Apr 2024 07:00    TPro  7.0  /  Alb  3.0<L>  /  TBili  0.4  /  DBili  x   /  AST  8<L>  /  ALT  37  /  AlkPhos  141<H>  04-23     Vital Signs Last 24 Hrs  T(C): 36.8 (24 Apr 2024 13:38), Max: 36.8 (23 Apr 2024 20:13)  T(F): 98.2 (24 Apr 2024 13:38), Max: 98.2 (23 Apr 2024 20:13)  HR: 76 (24 Apr 2024 13:38) (67 - 76)  BP: 128/70 (24 Apr 2024 13:38) (105/67 - 129/67)  BP(mean): 79 (24 Apr 2024 06:00) (79 - 79)  RR: 16 (24 Apr 2024 13:38) (16 - 18)  SpO2: 96% (24 Apr 2024 13:38) (95% - 97%)    Parameters below as of 24 Apr 2024 13:38  Patient On (Oxygen Delivery Method): room air      Sedimentation Rate, Erythrocyte: 71 mm/Hr (04-20-24 @ 06:33)  Sedimentation Rate, Erythrocyte: 71 mm/Hr (04-18-24 @ 05:41)         C-Reactive Protein, Serum: 24 mg/L (04-20-24 @ 06:33)  C-Reactive Protein, Serum: 28 mg/L (04-18-24 @ 05:41)  C-Reactive Protein, Serum: 106 mg/L (04-14-24 @ 05:41)  C-Reactive Protein, Serum: 279 mg/L (04-11-24 @ 14:45)   WBC Count: 8.99 K/uL (04-24-24 @ 07:00)      PHYSICAL EXAM  LE Focused:    Vasc: DP & PT palpable right foot. Left foot PT palpable, DP non palpable 2/2 edema. CFT <3 seconds to all 10 digits b/l. TG warm to warm left foot.   Derm: Post op: Medial left foot incision left open, surgical site appears erytheamtous, with base granualr. Dorsal incision extended approximaltely 3 cm, Wound base granular. No further purulence noted. Left foot increased ecchymosis noted to the dorsal aspect of the foot. Erythema, and edema noted to the left foot. No signs of any open lesions noted. Increased temperature noted to the dorsum of the left foot. No fluctuance noted over the dorsal aspect. No soft tissue crepitus noted. combined total of 12 cc or purulent drainage has been obtained from dorsal foot wound. Further purlent drainage was expressed from the wound 4/14 approx 2 cc.   Neuro: Gross and light touch sensation intact b/l  MSK: PTP to the left foot       IMAGING: ?xray    < from: US Duplex Venous Lower Ext Ltd, Left (04.11.24 @ 15:49) >  PROCEDURE DATE:  04/11/2024          INTERPRETATION:  CLINICAL INFORMATION: Left foot pain    COMPARISON: None available.    TECHNIQUE: Duplex sonography of the LEFT LOWER extremity veins with color   and spectral Doppler, with and without compression.    FINDINGS:    There is normal compressibility of the left common femoral, femoral and   popliteal veins.  The contralateral common femoral vein is patent.  Doppler examination shows normal spontaneous and phasic flow.    No calf vein thrombosis is detected.    IMPRESSION:  No evidence of left lower extremity deep venous thrombosis.            < end of copied text >  < from: Xray Foot AP + Lateral + Oblique, Left (04.11.24 @ 15:29) >      < end of copied text >  < from: Xray Foot AP + Lateral + Oblique, Left (04.11.24 @ 15:29) >      INTERPRETATION:  Bilateral ankles and left foot. Patient has local pain   and swelling.    COMPARISON: None available.    Left ankle. 3 views.    Arterial calcifications are noted.    There are some calcifications starting posterior to the posterior   calcaneus proceeding superiorly. There is mild degeneration of the   malleolar tips. No fracture.    Right ankle. 3 views.    There has been talar calcaneal fusion and also fusion of the lower fibula   with these 2 bones. Orthopedic hardware is seen over the lower medial   tibial area.    There is an inferior calcaneal spur.    No bone destruction or acute fracture.    Left foot. 3 views.    There is mild first MTP degeneration and mild swelling of the dorsum of   the foot. No fracture.    IMPRESSION: No acute fracture. Mild swelling dorsum left foot. Old fusion   of the right ankle joint with some orthopedic hardware.    --- End of Report ---    < end of copied text >      < from: CT Foot w/ IV Cont, Left (04.11.24 @ 19:27) >    INTERPRETATION:  Exam Type: CT FOOT WITH IV CONTRAST LEFT  Exam Date and Time: 4/11/2024 7:27 PM  Indication: Left foot pain. Evaluate forabscess.  Comparison: Same-day foot and ankle x-rays.    TECHNIQUE:  Multiplanar CT images of the left foot were obtained after administration   of 90 cc of Omnipaque 350 (10 cc discarded).    FINDINGS:  Evaluation of the left foot demonstrate juxtaarticular erosive changes at   the tibiotalar joint asymmetric to the distal lateral margin of the   tibia. Extensive mineralization is identified within the tibiotalar   joint. Similar findings of mineralization is seen about the first MTP   joint as well as at some of the TMT articulations. However, discrete   osseous erosions are not identified at these locations. There is no   osseous destruction or aggressive periosteal reaction identified. No   fracture or dislocation.    There is degenerative arthrosis involving the naviculocuneiform   articulation. No advanced productive changes are seen across the Lisfranc   interval.    Within the soft tissues, there is diffuse swelling identified along the   dorsal foot. There is fluid about the first TMT joint which decompresses   along the dorsal surface of the midfoot/forefoot. This fluid collection   along the dorsal midfoot/forefoot measures approximately 2.5 cm in the AP   dimension and approximately 2.9 cm in the transverse dimension. There is   peripheral enhancement with lack of central enhancement.    Mineralization is seen involving the distal insertional fibers of the   Achilles tendon as well as within the posterior tibialis tendon.   Otherwise, the imaged tendons are grossly intact.      IMPRESSION:  1.  Findings of gouty arthropathy about the foot as described. No CT   findings to suggest osteomyelitis.  2.  Peripheral enhancing fluid collection seen about the dorsal foot   about the first TMT joint as described is also felt to related to the   gouty arthropathy. An abscess is a less likely consideration but cannot   be entirely excluded on this examination.    < end of copied text >      CULTURES: Culture Results:   Rare Streptococcus dysgalactiae (Group C/G)   "Susceptibilities not performed" (04.18.24 @ 21:40)

## 2024-04-24 NOTE — PROGRESS NOTE ADULT - SUBJECTIVE AND OBJECTIVE BOX
NP Note discussed with  Primary Attending    Patient is a 73y old  Male who presents with a chief complaint of Left foot cellulitis with abscess (19 Apr 2024 12:16).  Comfortable in bed, awaiting left foot wound closure by podiatry.      INTERVAL HPI/OVERNIGHT EVENTS: no new complaints    MEDICATIONS  (STANDING):  amoxicillin  875 milliGRAM(s)/clavulanate 1 Tablet(s) Oral two times a day  enoxaparin Injectable 40 milliGRAM(s) SubCutaneous every 24 hours  insulin lispro (ADMELOG) corrective regimen sliding scale   SubCutaneous at bedtime  insulin lispro (ADMELOG) corrective regimen sliding scale   SubCutaneous three times a day before meals  lactated ringers. 1000 milliLiter(s) (75 mL/Hr) IV Continuous <Continuous>  losartan 25 milliGRAM(s) Oral daily    MEDICATIONS  (PRN):  acetaminophen     Tablet .. 650 milliGRAM(s) Oral every 6 hours PRN Temp greater or equal to 38C (100.4F), Mild Pain (1 - 3)  ondansetron Injectable 4 milliGRAM(s) IV Push every 8 hours PRN Nausea and/or Vomiting      __________________________________________________  REVIEW OF SYSTEMS:    CONSTITUTIONAL: No fever,   EYES: no acute visual disturbances  NECK: No pain or stiffness  RESPIRATORY: No cough; No shortness of breath  CARDIOVASCULAR: No chest pain, no palpitations  GASTROINTESTINAL: No pain. No nausea or vomiting; No diarrhea   NEUROLOGICAL: No headache or numbness, no tremors  MUSCULOSKELETAL: No joint pain, no muscle pain  GENITOURINARY: no dysuria, no frequency, no hesitancy  PSYCHIATRY: no depression , no anxiety  ALL OTHER  ROS negative        Vital Signs Last 24 Hrs  T(C): 36.8 (24 Apr 2024 13:38), Max: 36.8 (23 Apr 2024 20:13)  T(F): 98.2 (24 Apr 2024 13:38), Max: 98.2 (23 Apr 2024 20:13)  HR: 76 (24 Apr 2024 13:38) (67 - 76)  BP: 128/70 (24 Apr 2024 13:38) (105/67 - 129/67)  BP(mean): 79 (24 Apr 2024 06:00) (79 - 79)  RR: 16 (24 Apr 2024 13:38) (16 - 18)  SpO2: 96% (24 Apr 2024 13:38) (95% - 97%)    Parameters below as of 24 Apr 2024 13:38  Patient On (Oxygen Delivery Method): room air        ________________________________________________  PHYSICAL EXAM:  Well developed  GENERAL: NAD  HEENT: Normocephalic;  conjunctivae and sclerae clear; moist mucous membranes;   NECK : supple  CHEST/LUNG: Clear to auscultation bilaterally with good air entry   HEART: S1 S2  regular; no murmurs, gallops or rubs  ABDOMEN: Soft, Nontender, Nondistended; Bowel sounds present  EXTREMITIES: Left foot drsg C/D/I, no cyanosis; no edema; no calf tenderness  SKIN: warm and dry; no rash  NERVOUS SYSTEM:  Awake and alert; Oriented  to place, person and time ; no new deficits    _________________________________________________  LABS:                        12.2   8.99  )-----------( 511      ( 24 Apr 2024 07:00 )             37.5     04-24    140  |  110<H>  |  20<H>  ----------------------------<  110<H>  4.2   |  25  |  0.68    Ca    8.9      24 Apr 2024 07:00    TPro  7.0  /  Alb  3.0<L>  /  TBili  0.4  /  DBili  x   /  AST  8<L>  /  ALT  37  /  AlkPhos  141<H>  04-23      Urinalysis Basic - ( 24 Apr 2024 07:00 )    Color: x / Appearance: x / SG: x / pH: x  Gluc: 110 mg/dL / Ketone: x  / Bili: x / Urobili: x   Blood: x / Protein: x / Nitrite: x   Leuk Esterase: x / RBC: x / WBC x   Sq Epi: x / Non Sq Epi: x / Bacteria: x      CAPILLARY BLOOD GLUCOSE      POCT Blood Glucose.: 109 mg/dL (24 Apr 2024 11:32)  POCT Blood Glucose.: 100 mg/dL (24 Apr 2024 07:52)  POCT Blood Glucose.: 116 mg/dL (23 Apr 2024 21:32)  POCT Blood Glucose.: 114 mg/dL (23 Apr 2024 16:50)    RADIOLOGY & ADDITIONAL TESTS:    < from: MR Foot No Cont, Left (04.16.24 @ 16:43) >    ACC: [id number] EXAM:  MR FOOT LT   ORDERED BY: REYNA SORTO     PROCEDURE DATE:  04/16/2024          INTERPRETATION:  Exam Type: MR FOOT LEFT  Exam Date and Time: 4/16/2024 4:43 PM  Indication: Infection  Comparison: Left foot CT on 4/11/2024    TECHNIQUE:  Multiplanar multisequence MRI of the left hindfoot was performed.    FINDINGS:  BONES/JOINTS:  Again seen is juxtaarticular erosive changes at the tibiofibular   articulation most significant at the lateral aspect of the tibia.   Moderate osseous edema of the first and second tarsometatarsal joints   with mild erosive change along the lateral base of the first metatarsal   and medial aspect of the middle cuneiform. Cystic changes along the   medial aspect of the calcaneus are again visualized. No fracture.   Moderate osteoarthritis of the talonavicular articulation as well as   tarsometatarsal joints. Well-corticated ossific fragment distal to medial   malleolus from prior injury. Ankle mortise is intact. Small moderate   tibiotalar and subtalar joint effusions.    LATERAL LIGAMENTS:  Anterior talofibular ligament (ATFL): Thickened from chronic sprain.  Calcaneofibular ligament (CFL): Chronically sprained.  Posterior talofibular ligament (PTFL): Chronically sprained.  Tibiofibular syndesmosis: Heterogeneity spanning the anterior and   posterior syndesmotic ligaments which are not well identified.    LATERAL (PERONEAL) TENDONS:  Peroneus longus: No tendinosis or tear. No tenosynovitis.  Peroneus brevis: No tendinosis or tear. No tenosynovitis.    MEDIAL LIGAMENTS:  Deltoid ligament: Chronically sprained.  Spring ligament: Normal.    MEDIAL (FLEXOR) TENDONS:  Posterior tibialis: Moderate posterior tibialis tendinosis. Mild   tenosynovitis.  Flexor digitorum longus: No tendinosis or tear. Mild tenosynovitis.  Flexor hallucis longus: No tendinosis or tear. Mild tenosynovitis.    ANTERIOR (EXTENSOR) TENDONS:  Anterior tibialis: No tendinosis or tear. No tenosynovitis.  Extensor hallucis longus: No tendinosis or tear. No tenosynovitis.  Extensor digitorum longus: No tendinosis or tear. No tenosynovitis.    LISFRANC LIGAMENT: Marked edema at the Lisfranc interval. The Lisfranc   ligament appears grossly intact.    ACHILLES TENDON: Moderate distal Achilles tendinosis with questionable   low-grade interstitial tearing at the insertion.    PLANTAR FASCIA: Chronic thickening with minimal perifascial edema along   the medial band.    OTHER POSTERIOR/SOFT TISSUE STRUCTURES:  Sinus tarsi: Mild infiltration of the normalsinus tarsi fat.  Tarsal tunnel: Unimpinged; no mass effect.  Muscles: Edema within the partially visualized muscles of the forefoot.  Soft tissues: Diffuse soft tissue swelling at the dorsum of the foot.   Fluid surrounding the first tarsometatarsaljoint decompresses along the   dorsal surface of the midfoot/forefoot.    IMPRESSION:  1.  Again seen are findings of gouty arthropathy about the hindfoot. The   again seen fluid collection surrounding the first tarsometatarsal joint   and extending into the dorsal aspect of the foot is not well-visualized   and likely related to the gouty arthropathy given adjacent erosive   changes. However, underlying abscess infectious etiology cannot be   excluded. Clinical correlation is advised.  2.  Moderate distal Achilles tendinosis with questionable low-grade   interstitial tearing at the insertion.  3.  Moderate posterior tibialis tendinosis. Mild tenosynovitis.  4.  Chronic plantar fasciitis.    --- End of Report ---    < end of copied text >    < from: US Abdomen Upper Quadrant Right (04.16.24 @ 11:57) >    ACC: [id number] EXAM:  US ABDOMEN RT UPR QUADRANT   ORDERED BY: REYNA SORTO     PROCEDURE DATE:  04/16/2024          INTERPRETATION:  CLINICAL INFORMATION: Right upper quadrant transaminitis.    COMPARISON: None available.    TECHNIQUE: Sonography of the right upper quadrant.    FINDINGS:  Liver: Within normal limits.  Bile ducts: Normal caliber. Common bile duct measures 6 mm in caliber   which is within normal limits.  Gallbladder: Within normal limits.  Pancreas: Visualized portions are within normal limits.  Right kidney: 11.7 cm. No hydronephrosis. 6.5 x 7.6 x 6.9 cm.  Ascites: None.  IVC: Visualized portions are within normal limits.    IMPRESSION:  7.6 cm right renal cyst; otherwise, unremarkable right upper quadrant   abdominal ultrasound.    < end of copied text >    < from: CT Foot w/ IV Cont, Left (04.11.24 @ 19:27) >    ACC: [id number] EXAM:  CT FOOT ONLY IC LT   ORDERED BY: UZIEL FERGUSON     PROCEDURE DATE:  04/11/2024          INTERPRETATION:  Exam Type: CT FOOT WITH IV CONTRAST LEFT  Exam Date and Time: 4/11/2024 7:27 PM  Indication: Left foot pain. Evaluate forabscess.  Comparison: Same-day foot and ankle x-rays.    TECHNIQUE:  Multiplanar CT images of the left foot were obtained after administration   of 90 cc of Omnipaque 350 (10 cc discarded).    FINDINGS:  Evaluation of the left foot demonstrate juxtaarticular erosive changes at   the tibiotalar joint asymmetric to the distal lateral margin of the   tibia. Extensive mineralization is identified within the tibiotalar   joint. Similar findings of mineralization is seen about the first MTP   joint as well as at some of the TMT articulations. However, discrete   osseous erosions are not identified at these locations. There is no   osseous destruction or aggressive periosteal reaction identified. No   fracture or dislocation.    There is degenerative arthrosis involving the naviculocuneiform   articulation. No advanced productive changes are seen across the Lisfranc   interval.    Within the soft tissues, there is diffuse swelling identified along the   dorsal foot. There is fluid about the first TMT joint which decompresses   along the dorsal surface of the midfoot/forefoot. This fluid collection   along the dorsal midfoot/forefoot measures approximately 2.5 cm in the AP   dimension and approximately 2.9 cm in the transverse dimension. There is   peripheral enhancement with lack of central enhancement.    Mineralization is seen involving the distal insertional fibers of the   Achilles tendon as well as within the posterior tibialis tendon.   Otherwise, the imaged tendons are grossly intact.      IMPRESSION:  1.  Findings of gouty arthropathy about the foot as described. No CT   findings to suggest osteomyelitis.  2.  Peripheral enhancing fluid collection seen about the dorsal foot   about the first TMT joint as described is also felt to related to the   gouty arthropathy. An abscess is a less likely consideration but cannot   be entirely excluded on this examination.    --- End of Report ---    < end of copied text >      Imaging Personally Reviewed:  YES/NO    Consultant(s) Notes Reviewed:   YES/ No    Care Discussed with Consultants :     Plan of care was discussed with patient and /or primary care giver; all questions and concerns were addressed and care was aligned with patient's wishes.

## 2024-04-24 NOTE — PROGRESS NOTE ADULT - NS ATTEND OPT1 GEN_ALL_CORE
I attest my time as attending is greater than 50% of the total combined time spent on qualifying patient care activities by the PA/NP and attending.
I independently performed the documented:
I independently performed the documented:
I attest my time as attending is greater than 50% of the total combined time spent on qualifying patient care activities by the PA/NP and attending.
I independently performed the documented:
I attest my time as attending is greater than 50% of the total combined time spent on qualifying patient care activities by the PA/NP and attending.
I independently performed the documented:
I independently performed the documented:
I attest my time as attending is greater than 50% of the total combined time spent on qualifying patient care activities by the PA/NP and attending.

## 2024-04-24 NOTE — PROGRESS NOTE ADULT - PROBLEM SELECTOR PLAN 9
Stable for discharge from podiatry point  -PT recs home PT  -Augmentin x 7 days  -Discharge likely tomorrow 4/25

## 2024-04-24 NOTE — PROGRESS NOTE ADULT - PROBLEM SELECTOR PROBLEM 7
HLD (hyperlipidemia)
Renal cyst
HLD (hyperlipidemia)
HLD (hyperlipidemia)

## 2024-04-24 NOTE — PROGRESS NOTE ADULT - TIME BILLING
Continued active medical condition that can lead to significant mortality if not treated. Review labs cbc cmp a1c, review CT lower extremity. Independent historian- patient's wife. Review of charts and previous records. Medication management. Discussion with ID attending. Formulation of PLan, documentation of encounter
medical management and care coordination with different specialities
medical mx and care coordination
New medical event, change in plan. Discussion with podiatry team,. Discussion with patient, further discussion with patient's wife with additional history taken via ind. historian. Review of labs cbc cmp mag. Review of imaging. Medication management. Formulation and execution of Plan. Documentation of Encounter
New medical event, change in plan. For OR. Discussion with podiatry team,. Discussion with patient, further discussion with patient's wife with additional history taken via ind. historian. Review of labs cbc cmp mag. Review of imaging. Preoperative Risk assessment requiring further history. Review of MRI Medication management. Formulation and execution of Plan. Documentation of Encounter
Medical mx and care coordination
Discussing medical plan with ID Dr. Downey, discussing plan with patient, wife and daughter and further medical plan once patient is ready for discharge. Discussed patient's prediabetes and risk of diabetes along with diet and lifestyle modifications.

## 2024-04-24 NOTE — PROGRESS NOTE ADULT - NS ATTEND AMEND GEN_ALL_CORE FT
#Sepsis secondary to soft tissue infection  #Left foot cellulitis w/ abscess s/p I+D  #Gram Positive Bacteremia(likely contamination)  #Gouty arthritis  #SLE  #preDM  #HLD  s/p I+D 4/18, podiatry needs to close the wound but they are waiting for healing. Repeat cultures NGTD.  Podiatry, plan discussed - incision sites to be closed tomorrow   - Weight bearing as tolerated to heal   - Plan for augmentin x 7 days  Prediabetes needs more effective lifestyle modifications, AM sugars appears well controlled, added sliding scale for better infectious control   - A1c 6.4. Discussed prediabetes and risk of diabetes with patient and family today  -Uric Acid level controlled  -Home losartan resumed  - PT rec Home PT

## 2024-04-24 NOTE — PROGRESS NOTE ADULT - PROBLEM SELECTOR PLAN 7
-no home meds  -f/u am lipids   -c/w lifestyle modification
-noted on abdominal us  -pt can f/u outpt for further w/u
-no home meds  -f/u am lipids   -c/w lifestyle modification
no home meds  f/u am lipids   c/w lifestyle modification

## 2024-04-24 NOTE — PROGRESS NOTE ADULT - ASSESSMENT
Patient is a 73 year old Male from home with PMHx of HLD, prediabetes, SLE on hydroxychloroquine presents with left foot pain over the past 4 days. Admitted to medicine for L foot cellulitis with abscess. ID and podiatry consulted. Pain consulted.   Pt underwent I &D on 4/18.  Incision site left open with packing to allow for drainage per podiatry. pending intra op cx.   Podiatry reccs to keep NONWB until tomorrow 4/20 as patient had recent I &D and incision sites are open.   Following 4/20 Patient can be heel weight bearing left foot as tolerated.   post op PT eval -home PT.     4/22: Podiatry plan in progress: inpatient wound closure vs outpatient wound closure, pending recs   4/23: Podiatry to close wound inhouse 4/24, discharge planning with home PT, CNM following. Abx until 4/24 4/24-Underwent bedside wound closure by podiatry.  Pt. is stable for discharge from podiatry point.  PT recc home PT.  ID recc Augmentin x 7 days

## 2024-04-24 NOTE — PROGRESS NOTE ADULT - ASSESSMENT
A:  Left foot infected foot  s/p I &D left foot 4/18    P:   Patient evaluated and Chart reviewed   Discussed diagnosis and treatment with patient  X-rays evaluated, results as noted above  CT results reviewed   Cx results noted above.  Reviewed cx from I & D intraop 4/18  Continue with IV antibiotics As Per ID  Reviewed MRI, see above noted  S/p Left foot I &D, approx 10 cc of purulent drainage was expressed from left foot. No further purulence noted on exam today. Incision site left open with packing to allow for drainage.    Patient can be weight bearing as tolerated to the HEEL    Written consent was obtained with witness present. The left foot was sterily prepped using alcohol pads and 20cc of 1% Lidocaine plain was injected in a v block fashion around the wound. Using 3-0 nylon the dorsal incision measuring 4.5 cm x 1 cm was sutured together in a simple suture pattern. The medial incision which measured 5.5 cm x 1 cm was sutured together in a simple suture/ horizontal mattress pattern. The patient tolerated the procedure well. Minimal blood loss. Dressings including Adaptic, betadine, 4x4 gauze, ABD, jorge, Ace bandage. Patient is to leave dressings CDI.   Patient is stable from podiatry standpoint, no acute intervention at this time.     Seen, discussed and reviewed with Dr. Gonzalez  Discussed with Dr. Annie MOOREO: Betadine, 4x4 gauze, abd, ,jorge ace bandage to be changed every other day

## 2024-04-24 NOTE — PROGRESS NOTE ADULT - REASON FOR ADMISSION
right foot cellulitis
left foot cellulitis with abscess
Left foot cellulitis with abscess
left foot pain

## 2024-04-25 ENCOUNTER — TRANSCRIPTION ENCOUNTER (OUTPATIENT)
Age: 74
End: 2024-04-25

## 2024-04-25 VITALS
DIASTOLIC BLOOD PRESSURE: 77 MMHG | SYSTOLIC BLOOD PRESSURE: 149 MMHG | HEART RATE: 89 BPM | TEMPERATURE: 98 F | RESPIRATION RATE: 17 BRPM | OXYGEN SATURATION: 97 %

## 2024-04-25 LAB
ANION GAP SERPL CALC-SCNC: 8 MMOL/L — SIGNIFICANT CHANGE UP (ref 5–17)
BUN SERPL-MCNC: 21 MG/DL — HIGH (ref 7–18)
CALCIUM SERPL-MCNC: 8.7 MG/DL — SIGNIFICANT CHANGE UP (ref 8.4–10.5)
CHLORIDE SERPL-SCNC: 110 MMOL/L — HIGH (ref 96–108)
CO2 SERPL-SCNC: 22 MMOL/L — SIGNIFICANT CHANGE UP (ref 22–31)
CREAT SERPL-MCNC: 0.68 MG/DL — SIGNIFICANT CHANGE UP (ref 0.5–1.3)
EGFR: 99 ML/MIN/1.73M2 — SIGNIFICANT CHANGE UP
GLUCOSE BLDC GLUCOMTR-MCNC: 107 MG/DL — HIGH (ref 70–99)
GLUCOSE BLDC GLUCOMTR-MCNC: 111 MG/DL — HIGH (ref 70–99)
GLUCOSE SERPL-MCNC: 113 MG/DL — HIGH (ref 70–99)
HCT VFR BLD CALC: 38 % — LOW (ref 39–50)
HGB BLD-MCNC: 12.4 G/DL — LOW (ref 13–17)
MCHC RBC-ENTMCNC: 30.2 PG — SIGNIFICANT CHANGE UP (ref 27–34)
MCHC RBC-ENTMCNC: 32.6 GM/DL — SIGNIFICANT CHANGE UP (ref 32–36)
MCV RBC AUTO: 92.7 FL — SIGNIFICANT CHANGE UP (ref 80–100)
NRBC # BLD: 0 /100 WBCS — SIGNIFICANT CHANGE UP (ref 0–0)
PLATELET # BLD AUTO: 511 K/UL — HIGH (ref 150–400)
POTASSIUM SERPL-MCNC: 3.9 MMOL/L — SIGNIFICANT CHANGE UP (ref 3.5–5.3)
POTASSIUM SERPL-SCNC: 3.9 MMOL/L — SIGNIFICANT CHANGE UP (ref 3.5–5.3)
RBC # BLD: 4.1 M/UL — LOW (ref 4.2–5.8)
RBC # FLD: 12.7 % — SIGNIFICANT CHANGE UP (ref 10.3–14.5)
SODIUM SERPL-SCNC: 140 MMOL/L — SIGNIFICANT CHANGE UP (ref 135–145)
WBC # BLD: 8.83 K/UL — SIGNIFICANT CHANGE UP (ref 3.8–10.5)
WBC # FLD AUTO: 8.83 K/UL — SIGNIFICANT CHANGE UP (ref 3.8–10.5)

## 2024-04-25 PROCEDURE — 83735 ASSAY OF MAGNESIUM: CPT

## 2024-04-25 PROCEDURE — 96374 THER/PROPH/DIAG INJ IV PUSH: CPT

## 2024-04-25 PROCEDURE — 93005 ELECTROCARDIOGRAM TRACING: CPT

## 2024-04-25 PROCEDURE — 83605 ASSAY OF LACTIC ACID: CPT

## 2024-04-25 PROCEDURE — 85025 COMPLETE CBC W/AUTO DIFF WBC: CPT

## 2024-04-25 PROCEDURE — 87186 SC STD MICRODIL/AGAR DIL: CPT

## 2024-04-25 PROCEDURE — 97162 PT EVAL MOD COMPLEX 30 MIN: CPT

## 2024-04-25 PROCEDURE — 86900 BLOOD TYPING SEROLOGIC ABO: CPT

## 2024-04-25 PROCEDURE — 85610 PROTHROMBIN TIME: CPT

## 2024-04-25 PROCEDURE — 82962 GLUCOSE BLOOD TEST: CPT

## 2024-04-25 PROCEDURE — 87077 CULTURE AEROBIC IDENTIFY: CPT

## 2024-04-25 PROCEDURE — 85027 COMPLETE CBC AUTOMATED: CPT

## 2024-04-25 PROCEDURE — 97116 GAIT TRAINING THERAPY: CPT

## 2024-04-25 PROCEDURE — 87150 DNA/RNA AMPLIFIED PROBE: CPT

## 2024-04-25 PROCEDURE — 83036 HEMOGLOBIN GLYCOSYLATED A1C: CPT

## 2024-04-25 PROCEDURE — 87070 CULTURE OTHR SPECIMN AEROBIC: CPT

## 2024-04-25 PROCEDURE — 93971 EXTREMITY STUDY: CPT

## 2024-04-25 PROCEDURE — 80202 ASSAY OF VANCOMYCIN: CPT

## 2024-04-25 PROCEDURE — 99239 HOSP IP/OBS DSCHRG MGMT >30: CPT

## 2024-04-25 PROCEDURE — 86901 BLOOD TYPING SEROLOGIC RH(D): CPT

## 2024-04-25 PROCEDURE — 84550 ASSAY OF BLOOD/URIC ACID: CPT

## 2024-04-25 PROCEDURE — 80053 COMPREHEN METABOLIC PANEL: CPT

## 2024-04-25 PROCEDURE — 73701 CT LOWER EXTREMITY W/DYE: CPT | Mod: MC

## 2024-04-25 PROCEDURE — 86140 C-REACTIVE PROTEIN: CPT

## 2024-04-25 PROCEDURE — 80061 LIPID PANEL: CPT

## 2024-04-25 PROCEDURE — 73630 X-RAY EXAM OF FOOT: CPT

## 2024-04-25 PROCEDURE — 76705 ECHO EXAM OF ABDOMEN: CPT

## 2024-04-25 PROCEDURE — 93306 TTE W/DOPPLER COMPLETE: CPT

## 2024-04-25 PROCEDURE — 86803 HEPATITIS C AB TEST: CPT

## 2024-04-25 PROCEDURE — 85730 THROMBOPLASTIN TIME PARTIAL: CPT

## 2024-04-25 PROCEDURE — 73610 X-RAY EXAM OF ANKLE: CPT

## 2024-04-25 PROCEDURE — 99285 EMERGENCY DEPT VISIT HI MDM: CPT

## 2024-04-25 PROCEDURE — 87040 BLOOD CULTURE FOR BACTERIA: CPT

## 2024-04-25 PROCEDURE — 84100 ASSAY OF PHOSPHORUS: CPT

## 2024-04-25 PROCEDURE — 36415 COLL VENOUS BLD VENIPUNCTURE: CPT

## 2024-04-25 PROCEDURE — 81001 URINALYSIS AUTO W/SCOPE: CPT

## 2024-04-25 PROCEDURE — 86850 RBC ANTIBODY SCREEN: CPT

## 2024-04-25 PROCEDURE — 80048 BASIC METABOLIC PNL TOTAL CA: CPT

## 2024-04-25 PROCEDURE — 73718 MRI LOWER EXTREMITY W/O DYE: CPT | Mod: MC

## 2024-04-25 PROCEDURE — 85652 RBC SED RATE AUTOMATED: CPT

## 2024-04-25 RX ADMIN — Medication 650 MILLIGRAM(S): at 05:37

## 2024-04-25 RX ADMIN — Medication 1 TABLET(S): at 05:38

## 2024-04-25 RX ADMIN — Medication 650 MILLIGRAM(S): at 06:07

## 2024-04-25 NOTE — DISCHARGE NOTE NURSING/CASE MANAGEMENT/SOCIAL WORK - NSDCPEWEB_GEN_ALL_CORE
Father stated that school reported possible covid-19 positive exposure, child have no symptom, and unsure if its ok to proceed with 2nd dose of covid vaccine.  Advised its ok and no further assistance needed.   [facility] for Tobacco Control website --- [email or web address] --- [email or web address]

## 2024-04-25 NOTE — DISCHARGE NOTE NURSING/CASE MANAGEMENT/SOCIAL WORK - NSTRANSFERBELONGINGSRESP_GEN_A_NUR
"Subjective   Reason for Visit: Marcelle Shelton is an 76 y.o. female here for a Medicare Wellness visit.     Past Medical, Surgical, and Family History reviewed and updated in chart.    Reviewed all medications by prescribing practitioner or clinical pharmacist (such as prescriptions, OTCs, herbal therapies and supplements) and documented in the medical record.    Had some tingling in neck and arm   BP was a little high   Went to ER ,  chest pain work up negative   Tingling resolved     Diaastolic a little high 110-130/ 70-90     Hypothyroidism: Energy level has been stable, weight has been stable, patient is tolerating medication well  hx of cancer of thyroid, 2014: s/p excision, full excision: Is only getting blood work done     Ringing in ear chronically   Hearing check 6 months ago         exercising a lot   biking   no CP or SOB     Estrogen cream   Urge incontinence: Does have some prolapse, and has seen GYN for this    No history of blood clots      Up-to-date on bone density  Up-to-date on colonoscopy  Mammogram today    Fam hx : mother and GM with heart disease         Patient Care Team:  Arianna Oliveira MD as PCP - General (Family Medicine)  Arianna Oliveira MD as PCP - MSSP ACO Attributed Provider     Review of Systems   HENT:  Positive for tinnitus.        Objective   Vitals:  /86   Pulse 74   Temp 36.9 °C (98.4 °F)   Ht 1.753 m (5' 9\")   Wt 82.6 kg (182 lb)   SpO2 98%   BMI 26.88 kg/m²       Physical Exam  Constitutional:       General: She is not in acute distress.     Appearance: Normal appearance.   HENT:      Head: Normocephalic and atraumatic.      Right Ear: Tympanic membrane, ear canal and external ear normal.      Left Ear: Tympanic membrane, ear canal and external ear normal.      Nose: Nose normal. No congestion or rhinorrhea.      Mouth/Throat:      Mouth: Mucous membranes are moist.      Pharynx: No oropharyngeal exudate or posterior oropharyngeal erythema.   Eyes:      " Extraocular Movements: Extraocular movements intact.      Conjunctiva/sclera: Conjunctivae normal.      Pupils: Pupils are equal, round, and reactive to light.   Neck:      Vascular: No carotid bruit.   Cardiovascular:      Rate and Rhythm: Normal rate and regular rhythm.      Heart sounds: No murmur heard.  Pulmonary:      Effort: Pulmonary effort is normal.      Breath sounds: Normal breath sounds. No wheezing or rhonchi.   Abdominal:      General: Bowel sounds are normal.      Palpations: Abdomen is soft.   Musculoskeletal:         General: No swelling.      Cervical back: No rigidity.      Right lower leg: No edema.      Left lower leg: No edema.   Lymphadenopathy:      Cervical: No cervical adenopathy.   Skin:     General: Skin is warm and dry.      Findings: No rash.   Neurological:      General: No focal deficit present.      Mental Status: She is alert and oriented to person, place, and time.      Cranial Nerves: No cranial nerve deficit.      Gait: Gait normal.      Deep Tendon Reflexes: Reflexes normal.   Psychiatric:         Mood and Affect: Mood normal.         Behavior: Behavior normal.         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)    Relevant Orders    CBC    Comprehensive Metabolic Panel    Thyroid Stimulating Hormone    Lipid Panel    Thyroxine, Free    Hepatitis C antibody     Other Visit Diagnoses       Routine general medical examination at health care facility    -  Primary    Relevant Orders    CBC    Comprehensive Metabolic Panel     Thyroid Stimulating Hormone    Lipid Panel    Thyroxine, Free    Hepatitis C antibody    Need for hepatitis C screening test        Relevant Orders    CBC    Comprehensive Metabolic Panel    Thyroid Stimulating Hormone    Lipid Panel    Thyroxine, Free    Hepatitis C antibody    Encounter for screening for cardiovascular disorders        Relevant Orders    CT cardiac scoring wo IV contrast                yes

## 2024-04-25 NOTE — DISCHARGE NOTE NURSING/CASE MANAGEMENT/SOCIAL WORK - PATIENT PORTAL LINK FT
You can access the FollowMyHealth Patient Portal offered by [facility] by registering at the following website: [email or web address]. By joining [facility]’s FollowMyHealth portal, you will also be able to view your health information using other applications (apps) compatible with our system.

## 2024-04-25 NOTE — PROGRESS NOTE ADULT - SUBJECTIVE AND OBJECTIVE BOX
Interval: Patient was seen resting bedside. POD#7 S/p 4/18 L foot I&D. Patient admits to no pain to the left foot today. No purulent drainage noted on expression of the wound.      Patient is a 73y old  Male who presents with a chief complaint of left foot pain    HPI:73-year-old male with past medical history hyperlipidemia, prediabetes, SLE on hydroxychloroquine presents with left foot pain over the past 4 days.  Patient states he stepped on a stone 4 days ago, states shortly afterwards he start experiencing left foot redness, swelling, pain, fevers, and chills.  Patient report taking ibuprofen with little relief.  Patient seen by primary care doctor today, advised to the emergency department further evaluation.  Denies any nasal congestion, cough, chest pain, shortness of breath, abdominal pain, vomiting, numbness, or weakness.  Denies any additional complaints.      Podiatry HPI: 73-year-old male with past medical history hyperlipidemia, prediabetes, SLE on hydroxychloroquine presents with left foot pain over the past 4 days. Patient states that one monday he was walking and stepped on a stone. Patient states later that day he began to develop pain and fevers and chills. Patient states that this entire week his foot has become increasingly swollen and he has been unable to walk. Patient states he has had fevers and chills all week long. Patient went to his PCP who gave him pain meds and recommened he come to ED for further workup.     Medications acetaminophen     Tablet .. 650 milliGRAM(s) Oral every 6 hours PRN  amoxicillin  875 milliGRAM(s)/clavulanate 1 Tablet(s) Oral two times a day  enoxaparin Injectable 40 milliGRAM(s) SubCutaneous every 24 hours  insulin lispro (ADMELOG) corrective regimen sliding scale   SubCutaneous three times a day before meals  insulin lispro (ADMELOG) corrective regimen sliding scale   SubCutaneous at bedtime  lactated ringers. 1000 milliLiter(s) IV Continuous <Continuous>  losartan 25 milliGRAM(s) Oral daily  ondansetron Injectable 4 milliGRAM(s) IV Push every 8 hours PRN    FH,   PMHOsteoarthritis    Osteoarthritis of left knee    High cholesterol       PSHNo significant past surgical history    H/O foot surgery        Labs                          12.4   8.83  )-----------( 511      ( 25 Apr 2024 05:19 )             38.0      04-25    140  |  110<H>  |  21<H>  ----------------------------<  113<H>  3.9   |  22  |  0.68    Ca    8.7      25 Apr 2024 05:19       Vital Signs Last 24 Hrs  T(C): 36.8 (25 Apr 2024 05:25), Max: 36.8 (24 Apr 2024 13:38)  T(F): 98.3 (25 Apr 2024 05:25), Max: 98.3 (25 Apr 2024 05:25)  HR: 79 (25 Apr 2024 05:25) (76 - 79)  BP: 109/75 (25 Apr 2024 05:25) (109/75 - 128/70)  BP(mean): 72 (24 Apr 2024 20:30) (72 - 72)  RR: 17 (25 Apr 2024 05:25) (16 - 17)  SpO2: 94% (25 Apr 2024 05:25) (94% - 96%)    Parameters below as of 25 Apr 2024 05:25  Patient On (Oxygen Delivery Method): room air      Sedimentation Rate, Erythrocyte: 71 mm/Hr (04-20-24 @ 06:33)  Sedimentation Rate, Erythrocyte: 71 mm/Hr (04-18-24 @ 05:41)         C-Reactive Protein, Serum: 24 mg/L (04-20-24 @ 06:33)  C-Reactive Protein, Serum: 28 mg/L (04-18-24 @ 05:41)  C-Reactive Protein, Serum: 106 mg/L (04-14-24 @ 05:41)  C-Reactive Protein, Serum: 279 mg/L (04-11-24 @ 14:45)   WBC Count: 8.83 K/uL (04-25-24 @ 05:19)      PHYSICAL EXAM  LE Focused:    Vasc: DP & PT palpable right foot. Left foot PT palpable, DP non palpable 2/2 edema. CFT <3 seconds to all 10 digits b/l. TG warm to warm left foot.   Derm: Post sutures: SKin well coapted, sutures appear intact left foot. Post op: Medial left foot incision left open, surgical site appears erytheamtous, with base granualr. Dorsal incision extended approximaltely 3 cm, Wound base granular. No further purulence noted. Left foot increased ecchymosis noted to the dorsal aspect of the foot. Erythema, and edema noted to the left foot. No signs of any open lesions noted. Increased temperature noted to the dorsum of the left foot. No fluctuance noted over the dorsal aspect. No soft tissue crepitus noted. combined total of 12 cc or purulent drainage has been obtained from dorsal foot wound. Further purlent drainage was expressed from the wound 4/14 approx 2 cc.   Neuro: Gross and light touch sensation intact b/l  MSK: PTP to the left foot       IMAGING: ?xray    < from: US Duplex Venous Lower Ext Ltd, Left (04.11.24 @ 15:49) >  PROCEDURE DATE:  04/11/2024          INTERPRETATION:  CLINICAL INFORMATION: Left foot pain    COMPARISON: None available.    TECHNIQUE: Duplex sonography of the LEFT LOWER extremity veins with color   and spectral Doppler, with and without compression.    FINDINGS:    There is normal compressibility of the left common femoral, femoral and   popliteal veins.  The contralateral common femoral vein is patent.  Doppler examination shows normal spontaneous and phasic flow.    No calf vein thrombosis is detected.    IMPRESSION:  No evidence of left lower extremity deep venous thrombosis.            < end of copied text >  < from: Xray Foot AP + Lateral + Oblique, Left (04.11.24 @ 15:29) >      < end of copied text >  < from: Xray Foot AP + Lateral + Oblique, Left (04.11.24 @ 15:29) >      INTERPRETATION:  Bilateral ankles and left foot. Patient has local pain   and swelling.    COMPARISON: None available.    Left ankle. 3 views.    Arterial calcifications are noted.    There are some calcifications starting posterior to the posterior   calcaneus proceeding superiorly. There is mild degeneration of the   malleolar tips. No fracture.    Right ankle. 3 views.    There has been talar calcaneal fusion and also fusion of the lower fibula   with these 2 bones. Orthopedic hardware is seen over the lower medial   tibial area.    There is an inferior calcaneal spur.    No bone destruction or acute fracture.    Left foot. 3 views.    There is mild first MTP degeneration and mild swelling of the dorsum of   the foot. No fracture.    IMPRESSION: No acute fracture. Mild swelling dorsum left foot. Old fusion   of the right ankle joint with some orthopedic hardware.    --- End of Report ---    < end of copied text >      < from: CT Foot w/ IV Cont, Left (04.11.24 @ 19:27) >    INTERPRETATION:  Exam Type: CT FOOT WITH IV CONTRAST LEFT  Exam Date and Time: 4/11/2024 7:27 PM  Indication: Left foot pain. Evaluate forabscess.  Comparison: Same-day foot and ankle x-rays.    TECHNIQUE:  Multiplanar CT images of the left foot were obtained after administration   of 90 cc of Omnipaque 350 (10 cc discarded).    FINDINGS:  Evaluation of the left foot demonstrate juxtaarticular erosive changes at   the tibiotalar joint asymmetric to the distal lateral margin of the   tibia. Extensive mineralization is identified within the tibiotalar   joint. Similar findings of mineralization is seen about the first MTP   joint as well as at some of the TMT articulations. However, discrete   osseous erosions are not identified at these locations. There is no   osseous destruction or aggressive periosteal reaction identified. No   fracture or dislocation.    There is degenerative arthrosis involving the naviculocuneiform   articulation. No advanced productive changes are seen across the Lisfranc   interval.    Within the soft tissues, there is diffuse swelling identified along the   dorsal foot. There is fluid about the first TMT joint which decompresses   along the dorsal surface of the midfoot/forefoot. This fluid collection   along the dorsal midfoot/forefoot measures approximately 2.5 cm in the AP   dimension and approximately 2.9 cm in the transverse dimension. There is   peripheral enhancement with lack of central enhancement.    Mineralization is seen involving the distal insertional fibers of the   Achilles tendon as well as within the posterior tibialis tendon.   Otherwise, the imaged tendons are grossly intact.      IMPRESSION:  1.  Findings of gouty arthropathy about the foot as described. No CT   findings to suggest osteomyelitis.  2.  Peripheral enhancing fluid collection seen about the dorsal foot   about the first TMT joint as described is also felt to related to the   gouty arthropathy. An abscess is a less likely consideration but cannot   be entirely excluded on this examination.    < end of copied text >      CULTURES: Culture Results:   Rare Streptococcus dysgalactiae (Group C/G)   "Susceptibilities not performed" (04.18.24 @ 21:40)

## 2024-04-25 NOTE — DISCHARGE NOTE NURSING/CASE MANAGEMENT/SOCIAL WORK - NSDCPEFALRISK_GEN_ALL_CORE
For information on Fall & Injury Prevention, visit: [email or web address] OR  [email or web address] OR  https://www.cdc.gov/steadi/patient.html

## 2024-04-25 NOTE — PROGRESS NOTE ADULT - PROVIDER SPECIALTY LIST ADULT
Infectious Disease
Internal Medicine
Podiatry
Hospitalist
Infectious Disease
Infectious Disease
Internal Medicine
Podiatry
Internal Medicine
Podiatry
Internal Medicine

## 2024-04-25 NOTE — PROGRESS NOTE ADULT - ASSESSMENT
A:  Left foot infected foot  s/p I &D left foot 4/18    P:   Patient evaluated and Chart reviewed   Discussed diagnosis and treatment with patient  X-rays evaluated, results as noted above  CT results reviewed   Cx results noted above.  Reviewed cx from I & D intraop 4/18  Continue with IV antibiotics As Per ID  Reviewed MRI, see above noted  Patient can be weight bearing as tolerated to the HEEL    No pain to left foot, sutures appear intact and skin well coapted.   Patient is stable from podiatry standpoint, no acute intervention at this time.     Discussed with Dr. Valencia     WCO: Betadine, 4x4 gauze, abd, ,jorge ace bandage to be changed every other day

## 2024-04-25 NOTE — DISCHARGE NOTE NURSING/CASE MANAGEMENT/SOCIAL WORK - NSDCPEEMAIL_GEN_ALL_CORE
[facility] for Tobacco Control email [email or web address]

## 2024-04-27 LAB
CULTURE RESULTS: SIGNIFICANT CHANGE UP
SPECIMEN SOURCE: SIGNIFICANT CHANGE UP

## 2024-05-02 NOTE — CHART NOTE - NSCHARTNOTEFT_GEN_A_CORE
Karen, patient;'s wife called. I made a followup podaitry appointment for Dr. Valencia on May 9th at 11Am at [address] [place] [address] 2nd floor [address]

## 2024-05-09 ENCOUNTER — OUTPATIENT (OUTPATIENT)
Dept: OUTPATIENT SERVICES | Facility: HOSPITAL | Age: 74
LOS: 1 days | End: 2024-05-09
Payer: MEDICARE

## 2024-05-09 ENCOUNTER — APPOINTMENT (OUTPATIENT)
Dept: PODIATRY | Facility: CLINIC | Age: 74
End: 2024-05-09

## 2024-05-09 VITALS
BODY MASS INDEX: 23.63 KG/M2 | HEART RATE: 95 BPM | SYSTOLIC BLOOD PRESSURE: 130 MMHG | WEIGHT: 147 LBS | OXYGEN SATURATION: 98 % | DIASTOLIC BLOOD PRESSURE: 70 MMHG | TEMPERATURE: 97.2 F | RESPIRATION RATE: 18 BRPM | HEIGHT: 66 IN

## 2024-05-09 DIAGNOSIS — Z00.00 ENCOUNTER FOR GENERAL ADULT MEDICAL EXAMINATION WITHOUT ABNORMAL FINDINGS: ICD-10-CM

## 2024-05-09 PROCEDURE — G0463: CPT

## 2024-05-14 DIAGNOSIS — L02.612 CUTANEOUS ABSCESS OF LEFT FOOT: ICD-10-CM

## 2024-05-16 ENCOUNTER — OUTPATIENT (OUTPATIENT)
Dept: OUTPATIENT SERVICES | Facility: HOSPITAL | Age: 74
LOS: 1 days | End: 2024-05-16
Payer: MEDICARE

## 2024-05-16 ENCOUNTER — APPOINTMENT (OUTPATIENT)
Dept: PODIATRY | Facility: CLINIC | Age: 74
End: 2024-05-16

## 2024-05-16 VITALS
HEART RATE: 74 BPM | WEIGHT: 147 LBS | SYSTOLIC BLOOD PRESSURE: 129 MMHG | HEIGHT: 66 IN | DIASTOLIC BLOOD PRESSURE: 68 MMHG | BODY MASS INDEX: 23.63 KG/M2 | TEMPERATURE: 97.8 F | RESPIRATION RATE: 18 BRPM | OXYGEN SATURATION: 96 %

## 2024-05-16 DIAGNOSIS — E11.621 TYPE 2 DIABETES MELLITUS WITH FOOT ULCER: ICD-10-CM

## 2024-05-16 DIAGNOSIS — M79.672 PAIN IN LEFT FOOT: ICD-10-CM

## 2024-05-16 DIAGNOSIS — Z98.890 OTHER SPECIFIED POSTPROCEDURAL STATES: Chronic | ICD-10-CM

## 2024-05-16 DIAGNOSIS — Z00.00 ENCOUNTER FOR GENERAL ADULT MEDICAL EXAMINATION WITHOUT ABNORMAL FINDINGS: ICD-10-CM

## 2024-05-16 PROCEDURE — G0463: CPT

## 2024-05-16 NOTE — HISTORY OF PRESENT ILLNESS
[FreeTextEntry1] : 73-year-old male with past medical history hyperlipidemia, prediabetes, SLE on hydroxychloroquine presents as a hosptial follow up. Patient had an incision and drainage during his stay at the hospital. Patient is still experiencing pain, but finished his course of antibiotics from the hospital. Patients wife states she changes the dressing everyday. Patients wife states that she applies alcohol and betadine to patients foot everyday. Denies any constitutional symptoms.  Last A1c was 6.4% (4/13/24). Does not check is FBS.

## 2024-05-16 NOTE — ASSESSMENT
[FreeTextEntry1] : PHyscial exam:  Vasc: DP & PT palpable right foot. CFT <3 seconds to all 10 digits b/l. TG warm to warm left foot.  Derm: left foot s/p I&D, sites well coapted, no clinical signs of infection, edema noted to the foot, .1cmx.1cm superficial abrasion noted to the medial aspect with no clinical signs of infection noted Neuro: Gross and light touch sensation intact b/l MSK: 5/5 muscle strength noted to all compartments   A: S/P incision and drainage  P: Patient and patients chart were reviewed Diagnosis was discussed with patient No clinical signs of infection Recommend compression stockings  Continue betadine/DSD  Pt can transition to sneakers as tolerated  ED precautions given RTC 2 weeks   Written by Yulissa Calixto

## 2024-05-16 NOTE — HISTORY OF PRESENT ILLNESS
[FreeTextEntry1] : 73-year-old male with past medical history hyperlipidemia, prediabetes, SLE on hydroxychloroquine presents as a hosptial follow up. Patient had an incision and drainage during his stay at the hospital. Patient is still experiencing pain, but finished his course of antibiotics from the hospital. Patients wife states she changes the dressing everyday. Patients wife states that she applies alcohol and betadine to patients foot everyday. Denies any constitutional symptoms.

## 2024-05-16 NOTE — ASSESSMENT
[FreeTextEntry1] : PHyscial exam:  Vasc: DP & PT palpable right foot. CFT <3 seconds to all 10 digits b/l. TG warm to warm left foot.  Derm: SKin well coapted, sutures appear intact left foot. Eschars noted under sutures. No further purulence noted. Erythema noted to left foot. No edema. Medial incision noted to be open slightly in central incision. Increased temperature noted to the dorsum of the left foot. No fluctuance noted over the dorsal aspect. No soft tissue crepitus noted. No fluctuance/crepitus/streaking. Neuro: Gross and light touch sensation intact b/l MSK: PTP to the left foot   A: S/P incision and drainage  P: Patient and patients chart were reviewed Diagnosis was discussed with patient Sutures were removed today- medial central incison slightly open No clincial signs of infection Patient instructed to change dressing everyday  Keep foot dry Apply betadine to incision sites and keep them covered with betadine jorge ahn and ace ED precautions given RTC 1 week

## 2024-05-30 ENCOUNTER — APPOINTMENT (OUTPATIENT)
Dept: PODIATRY | Facility: CLINIC | Age: 74
End: 2024-05-30

## 2024-05-30 ENCOUNTER — OUTPATIENT (OUTPATIENT)
Dept: OUTPATIENT SERVICES | Facility: HOSPITAL | Age: 74
LOS: 1 days | End: 2024-05-30
Payer: MEDICARE

## 2024-05-30 VITALS
SYSTOLIC BLOOD PRESSURE: 129 MMHG | WEIGHT: 150 LBS | HEART RATE: 75 BPM | RESPIRATION RATE: 18 BRPM | BODY MASS INDEX: 24.11 KG/M2 | OXYGEN SATURATION: 98 % | DIASTOLIC BLOOD PRESSURE: 79 MMHG | HEIGHT: 66 IN | TEMPERATURE: 97.4 F

## 2024-05-30 DIAGNOSIS — Z00.00 ENCOUNTER FOR GENERAL ADULT MEDICAL EXAMINATION WITHOUT ABNORMAL FINDINGS: ICD-10-CM

## 2024-05-30 DIAGNOSIS — Z98.890 OTHER SPECIFIED POSTPROCEDURAL STATES: Chronic | ICD-10-CM

## 2024-05-30 PROCEDURE — G0463: CPT

## 2024-06-03 DIAGNOSIS — E11.621 TYPE 2 DIABETES MELLITUS WITH FOOT ULCER: ICD-10-CM

## 2024-06-03 DIAGNOSIS — L97.422 NON-PRESSURE CHRONIC ULCER OF LEFT HEEL AND MIDFOOT WITH FAT LAYER EXPOSED: ICD-10-CM

## 2024-06-06 ENCOUNTER — OUTPATIENT (OUTPATIENT)
Dept: OUTPATIENT SERVICES | Facility: HOSPITAL | Age: 74
LOS: 1 days | End: 2024-06-06
Payer: MEDICARE

## 2024-06-06 ENCOUNTER — APPOINTMENT (OUTPATIENT)
Dept: PODIATRY | Facility: CLINIC | Age: 74
End: 2024-06-06

## 2024-06-06 VITALS
BODY MASS INDEX: 24.11 KG/M2 | HEIGHT: 66 IN | OXYGEN SATURATION: 96 % | WEIGHT: 150 LBS | RESPIRATION RATE: 18 BRPM | DIASTOLIC BLOOD PRESSURE: 69 MMHG | HEART RATE: 70 BPM | SYSTOLIC BLOOD PRESSURE: 115 MMHG | TEMPERATURE: 97.2 F

## 2024-06-06 DIAGNOSIS — Z00.00 ENCOUNTER FOR GENERAL ADULT MEDICAL EXAMINATION WITHOUT ABNORMAL FINDINGS: ICD-10-CM

## 2024-06-06 DIAGNOSIS — Z98.890 OTHER SPECIFIED POSTPROCEDURAL STATES: Chronic | ICD-10-CM

## 2024-06-06 PROCEDURE — G0463: CPT

## 2024-06-06 NOTE — ASSESSMENT
[FreeTextEntry1] : PHyscial exam:  Vasc: DP & PT palpable right foot. CFT <3 seconds to all 10 digits b/l. TG warm to warm left foot.  Derm: left foot s/p I&D, sites well coapted, no clinical signs of infection, edema noted to the foot, .1cmx.1cm superficial abrasion noted to the medial aspect with no clinical signs of infection noted Neuro: Gross and light touch sensation intact b/l MSK: 5/5 muscle strength noted to all compartments   A: S/P incision and drainage  P: Patient and patients chart were reviewed Diagnosis was discussed with patient No clinical signs of infection Recommend compression stockings  Continue betadine/DSD  Pt can transition to sneakers as tolerated  ED precautions given RTC 1 weeks   Written by Joselito Ryan

## 2024-06-06 NOTE — HISTORY OF PRESENT ILLNESS
[FreeTextEntry1] : 73-year-old male with past medical history hyperlipidemia, prediabetes, SLE on hydroxychloroquine presents as a hosptial follow up. Patient had an incision and drainage during his stay at the hospital. Patient is still experiencing pain, but finished his course of antibiotics from the hospital. Patients wife states she changes the dressing everyday. Patients wife states that she applies alcohol and betadine to patients foot everyday. Denies any constitutional symptoms.  Last A1c was 5.9% (5/22/24).   Patient states he noticed his foot getting swollen

## 2024-06-07 DIAGNOSIS — M79.672 PAIN IN LEFT FOOT: ICD-10-CM

## 2024-06-07 DIAGNOSIS — R22.42 LOCALIZED SWELLING, MASS AND LUMP, LEFT LOWER LIMB: ICD-10-CM

## 2024-06-08 NOTE — PROGRESS NOTE ADULT - PROBLEM SELECTOR PLAN 1
Name band; P/W right foot increased ecchymosis on dorsal foot. Erythema, and edema noted to the left foot.   Xray foot- Mild swelling dorsum left foot. Old fusion of the right ankle joint with some orthopedic hardware.  Marlon duplex- Neg for DVT   CT as above   switched to Unasyn if improved continue Augmentin outpatient  f/u MRI left foot  pain management   podiatry following  ID consulted - P/W right foot increased ecchymosis on dorsal foot. Erythema, and edema noted to the left foot.   - Xray foot- Mild swelling dorsum left foot. Old fusion of the right ankle joint with some orthopedic hardware.  - MRI left foot- gouty arthropathy about the hindfoot. fluid collection surrounding the first tarsometatarsal joint.     Moderate distal Achilles tendinosis with questionable low-grade interstitial tearing at the insertion. Mild     tenosynovitis. Chronic plantar fasciitis.  - Marlon duplex- Neg for DVT   - c/w unasyn    Plan for OR-->> I & D 4/18/24  pain management , podiatry following, DIANDRA Downey

## 2024-06-13 NOTE — HISTORY OF PRESENT ILLNESS
[FreeTextEntry1] : 73-year-old male with past medical history hyperlipidemia, prediabetes, SLE on hydroxychloroquine presents as a hosptial follow up. Patient had an incision and drainage during his stay at the hospital. Patient is still experiencing pain, but finished his course of antibiotics from the hospital. Patients wife states she changes the dressing everyday. Patients wife states that she applies alcohol and betadine to patients foot everyday. Denies any constitutional symptoms.  Last A1c was 5.9% (5/22/24).

## 2024-06-13 NOTE — ASSESSMENT
[FreeTextEntry1] : PHyscial exam:  Vasc: DP & PT palpable right foot. CFT <3 seconds to all 10 digits b/l. TG warm to warm left foot.  Derm: left foot s/p I&D, sites well coapted, no clinical signs of infection, edema noted to the foot, no open wounds noted  Neuro: Gross and light touch sensation intact b/l MSK: 5/5 muscle strength noted to all compartments   A: S/P incision and drainage  P: Patient and patients chart were reviewed Diagnosis was discussed with patient No clinical signs of infection Recommend compression stockings  Recommend keep interspaces clean and dry  Pt can transition to sneakers as tolerated  ED precautions given RTC 4 weeks   Written by Yulissa Calixto

## 2024-07-18 ENCOUNTER — OUTPATIENT (OUTPATIENT)
Dept: OUTPATIENT SERVICES | Facility: HOSPITAL | Age: 74
LOS: 1 days | End: 2024-07-18
Payer: MEDICARE

## 2024-07-18 ENCOUNTER — APPOINTMENT (OUTPATIENT)
Dept: PODIATRY | Facility: CLINIC | Age: 74
End: 2024-07-18

## 2024-07-18 VITALS
HEART RATE: 73 BPM | HEIGHT: 66 IN | BODY MASS INDEX: 24.75 KG/M2 | DIASTOLIC BLOOD PRESSURE: 73 MMHG | TEMPERATURE: 97.7 F | WEIGHT: 154 LBS | OXYGEN SATURATION: 98 % | SYSTOLIC BLOOD PRESSURE: 118 MMHG

## 2024-07-18 DIAGNOSIS — R60.0 LOCALIZED EDEMA: ICD-10-CM

## 2024-07-18 DIAGNOSIS — R60.9 EDEMA, UNSPECIFIED: ICD-10-CM

## 2024-07-18 DIAGNOSIS — Z00.00 ENCOUNTER FOR GENERAL ADULT MEDICAL EXAMINATION WITHOUT ABNORMAL FINDINGS: ICD-10-CM

## 2024-07-18 DIAGNOSIS — M79.672 PAIN IN LEFT FOOT: ICD-10-CM

## 2024-07-18 DIAGNOSIS — Z98.890 OTHER SPECIFIED POSTPROCEDURAL STATES: Chronic | ICD-10-CM

## 2024-07-18 PROCEDURE — G0463: CPT

## 2024-07-19 DIAGNOSIS — M79.672 PAIN IN LEFT FOOT: ICD-10-CM

## 2024-07-19 DIAGNOSIS — R60.0 LOCALIZED EDEMA: ICD-10-CM

## 2024-12-06 ENCOUNTER — APPOINTMENT (OUTPATIENT)
Dept: INTERNAL MEDICINE | Facility: CLINIC | Age: 74
End: 2024-12-06
Payer: MEDICARE

## 2024-12-06 ENCOUNTER — OUTPATIENT (OUTPATIENT)
Dept: OUTPATIENT SERVICES | Facility: HOSPITAL | Age: 74
LOS: 1 days | End: 2024-12-06
Payer: COMMERCIAL

## 2024-12-06 ENCOUNTER — NON-APPOINTMENT (OUTPATIENT)
Age: 74
End: 2024-12-06

## 2024-12-06 VITALS
OXYGEN SATURATION: 99 % | SYSTOLIC BLOOD PRESSURE: 122 MMHG | DIASTOLIC BLOOD PRESSURE: 70 MMHG | BODY MASS INDEX: 24.75 KG/M2 | HEART RATE: 62 BPM | WEIGHT: 154 LBS | HEIGHT: 66 IN | TEMPERATURE: 98.1 F

## 2024-12-06 DIAGNOSIS — Z98.890 OTHER SPECIFIED POSTPROCEDURAL STATES: Chronic | ICD-10-CM

## 2024-12-06 DIAGNOSIS — N28.1 CYST OF KIDNEY, ACQUIRED: ICD-10-CM

## 2024-12-06 DIAGNOSIS — M32.9 SYSTEMIC LUPUS ERYTHEMATOSUS, UNSPECIFIED: ICD-10-CM

## 2024-12-06 DIAGNOSIS — K63.5 POLYP OF COLON: ICD-10-CM

## 2024-12-06 DIAGNOSIS — M17.9 OSTEOARTHRITIS OF KNEE, UNSPECIFIED: ICD-10-CM

## 2024-12-06 DIAGNOSIS — F17.200 NICOTINE DEPENDENCE, UNSPECIFIED, UNCOMPLICATED: ICD-10-CM

## 2024-12-06 DIAGNOSIS — Z00.00 ENCOUNTER FOR GENERAL ADULT MEDICAL EXAMINATION W/OUT ABNORMAL FINDINGS: ICD-10-CM

## 2024-12-06 DIAGNOSIS — J06.9 ACUTE UPPER RESPIRATORY INFECTION, UNSPECIFIED: ICD-10-CM

## 2024-12-06 DIAGNOSIS — Z00.00 ENCOUNTER FOR GENERAL ADULT MEDICAL EXAMINATION WITHOUT ABNORMAL FINDINGS: ICD-10-CM

## 2024-12-06 DIAGNOSIS — L98.9 DISORDER OF THE SKIN AND SUBCUTANEOUS TISSUE, UNSPECIFIED: ICD-10-CM

## 2024-12-06 DIAGNOSIS — B35.1 TINEA UNGUIUM: ICD-10-CM

## 2024-12-06 DIAGNOSIS — H61.20 IMPACTED CERUMEN, UNSPECIFIED EAR: ICD-10-CM

## 2024-12-06 PROCEDURE — 81001 URINALYSIS AUTO W/SCOPE: CPT

## 2024-12-06 PROCEDURE — 82043 UR ALBUMIN QUANTITATIVE: CPT

## 2024-12-06 PROCEDURE — 84153 ASSAY OF PSA TOTAL: CPT

## 2024-12-06 PROCEDURE — G0463: CPT

## 2024-12-06 PROCEDURE — 80053 COMPREHEN METABOLIC PANEL: CPT

## 2024-12-06 PROCEDURE — 99406 BEHAV CHNG SMOKING 3-10 MIN: CPT

## 2024-12-06 PROCEDURE — 83036 HEMOGLOBIN GLYCOSYLATED A1C: CPT

## 2024-12-06 PROCEDURE — 85652 RBC SED RATE AUTOMATED: CPT

## 2024-12-06 PROCEDURE — 82607 VITAMIN B-12: CPT

## 2024-12-06 PROCEDURE — 86140 C-REACTIVE PROTEIN: CPT

## 2024-12-06 PROCEDURE — 0225U NFCT DS DNA&RNA 21 SARSCOV2: CPT

## 2024-12-06 PROCEDURE — 85025 COMPLETE CBC W/AUTO DIFF WBC: CPT

## 2024-12-06 PROCEDURE — G0439: CPT

## 2024-12-06 PROCEDURE — 86038 ANTINUCLEAR ANTIBODIES: CPT

## 2024-12-06 PROCEDURE — 36415 COLL VENOUS BLD VENIPUNCTURE: CPT

## 2024-12-06 PROCEDURE — 86225 DNA ANTIBODY NATIVE: CPT

## 2024-12-06 PROCEDURE — 82306 VITAMIN D 25 HYDROXY: CPT

## 2024-12-06 PROCEDURE — 84154 ASSAY OF PSA FREE: CPT

## 2024-12-06 PROCEDURE — 80061 LIPID PANEL: CPT

## 2024-12-06 RX ORDER — ASPIRIN 81 MG
6.5 TABLET, DELAYED RELEASE (ENTERIC COATED) ORAL
Qty: 1 | Refills: 0 | Status: ACTIVE | COMMUNITY
Start: 2024-12-06 | End: 1900-01-01

## 2024-12-06 RX ORDER — HYDROXYCHLOROQUINE SULFATE 200 MG/1
200 TABLET, FILM COATED ORAL DAILY
Qty: 90 | Refills: 0 | Status: ACTIVE | COMMUNITY
Start: 2024-12-06

## 2024-12-07 PROBLEM — R73.03 PREDIABETES: Status: ACTIVE | Noted: 2024-12-07

## 2024-12-07 PROBLEM — R80.9 PROTEINURIA, UNSPECIFIED TYPE: Status: ACTIVE | Noted: 2024-12-07

## 2024-12-07 PROBLEM — E55.9 VITAMIN D INSUFFICIENCY: Status: ACTIVE | Noted: 2024-12-07

## 2024-12-07 PROBLEM — E78.5 HLD (HYPERLIPIDEMIA): Status: ACTIVE | Noted: 2024-12-07

## 2024-12-07 LAB
ALBUMIN SERPL ELPH-MCNC: 4.5 G/DL
ALP BLD-CCNC: 108 U/L
ALT SERPL-CCNC: 11 U/L
ANION GAP SERPL CALC-SCNC: 11 MMOL/L
APPEARANCE: CLEAR
AST SERPL-CCNC: 13 U/L
BACTERIA: NEGATIVE /HPF
BASOPHILS # BLD AUTO: 0.03 K/UL
BASOPHILS NFR BLD AUTO: 0.4 %
BILIRUB SERPL-MCNC: 0.4 MG/DL
BILIRUBIN URINE: NEGATIVE
BLOOD URINE: NEGATIVE
BUN SERPL-MCNC: 24 MG/DL
CALCIUM OXALATE CRYSTALS: PRESENT
CALCIUM SERPL-MCNC: 9.8 MG/DL
CAST: 0 /LPF
CHLORIDE SERPL-SCNC: 107 MMOL/L
CO2 SERPL-SCNC: 23 MMOL/L
COLOR: YELLOW
CREAT SERPL-MCNC: 0.77 MG/DL
CREAT SPEC-SCNC: 226 MG/DL
CRP SERPL-MCNC: 10 MG/L
EGFR: 95 ML/MIN/1.73M2
EOSINOPHIL # BLD AUTO: 0.42 K/UL
EOSINOPHIL NFR BLD AUTO: 5.1 %
EPITHELIAL CELLS: 2 /HPF
ERYTHROCYTE [SEDIMENTATION RATE] IN BLOOD BY WESTERGREN METHOD: 29 MM/HR
GLUCOSE QUALITATIVE U: NEGATIVE MG/DL
GLUCOSE SERPL-MCNC: 105 MG/DL
HCT VFR BLD CALC: 44.3 %
HGB BLD-MCNC: 14.7 G/DL
IMM GRANULOCYTES NFR BLD AUTO: 0.5 %
KETONES URINE: NEGATIVE MG/DL
LEUKOCYTE ESTERASE URINE: ABNORMAL
LYMPHOCYTES # BLD AUTO: 2.24 K/UL
LYMPHOCYTES NFR BLD AUTO: 27.2 %
MAN DIFF?: NORMAL
MCHC RBC-ENTMCNC: 30.8 PG
MCHC RBC-ENTMCNC: 33.2 G/DL
MCV RBC AUTO: 92.9 FL
MICROALBUMIN 24H UR DL<=1MG/L-MCNC: 4.7 MG/DL
MICROALBUMIN/CREAT 24H UR-RTO: 21 MG/G
MICROSCOPIC-UA: NORMAL
MONOCYTES # BLD AUTO: 0.52 K/UL
MONOCYTES NFR BLD AUTO: 6.3 %
NEUTROPHILS # BLD AUTO: 4.99 K/UL
NEUTROPHILS NFR BLD AUTO: 60.5 %
NITRITE URINE: NEGATIVE
PH URINE: 5
PLATELET # BLD AUTO: 369 K/UL
POTASSIUM SERPL-SCNC: 4.6 MMOL/L
PROT SERPL-MCNC: 7.2 G/DL
PROTEIN URINE: 30 MG/DL
RAPID RVP RESULT: NOT DETECTED
RBC # BLD: 4.77 M/UL
RBC # FLD: 12.9 %
RED BLOOD CELLS URINE: 1 /HPF
REVIEW: NORMAL
SARS-COV-2 RNA RESP QL NAA+PROBE: NOT DETECTED
SODIUM SERPL-SCNC: 141 MMOL/L
SPECIFIC GRAVITY URINE: 1.03
UROBILINOGEN URINE: 0.2 MG/DL
VIT B12 SERPL-MCNC: 800 PG/ML
WBC # FLD AUTO: 8.24 K/UL
WHITE BLOOD CELLS URINE: 5 /HPF

## 2024-12-09 LAB
25(OH)D3 SERPL-MCNC: 28.7 NG/ML
CHOLEST SERPL-MCNC: 221 MG/DL
DSDNA AB SER-ACNC: 17 IU/ML
ESTIMATED AVERAGE GLUCOSE: 131 MG/DL
HBA1C MFR BLD HPLC: 6.2 %
HDLC SERPL-MCNC: 35 MG/DL
LDLC SERPL CALC-MCNC: 150 MG/DL
NONHDLC SERPL-MCNC: 186 MG/DL
PSA FREE FLD-MCNC: 10 %
PSA FREE SERPL-MCNC: 0.66 NG/ML
PSA SERPL-MCNC: 6.31 NG/ML
TRIGL SERPL-MCNC: 192 MG/DL

## 2024-12-09 RX ORDER — SIMVASTATIN 20 MG/1
20 TABLET, FILM COATED ORAL
Qty: 90 | Refills: 0 | Status: ACTIVE | COMMUNITY
Start: 2024-12-09 | End: 1900-01-01

## 2024-12-09 RX ORDER — ERGOCALCIFEROL (VITAMIN D2) 50 MCG
50 MCG CAPSULE ORAL
Qty: 90 | Refills: 0 | Status: ACTIVE | COMMUNITY
Start: 2024-12-09 | End: 1900-01-01

## 2024-12-10 LAB — ANA SER IF-ACNC: NEGATIVE

## 2024-12-11 ENCOUNTER — OUTPATIENT (OUTPATIENT)
Dept: OUTPATIENT SERVICES | Facility: HOSPITAL | Age: 74
LOS: 1 days | End: 2024-12-11
Payer: COMMERCIAL

## 2024-12-11 ENCOUNTER — APPOINTMENT (OUTPATIENT)
Dept: PODIATRY | Facility: CLINIC | Age: 74
End: 2024-12-11

## 2024-12-11 VITALS
HEIGHT: 66 IN | WEIGHT: 150 LBS | SYSTOLIC BLOOD PRESSURE: 120 MMHG | TEMPERATURE: 97.3 F | DIASTOLIC BLOOD PRESSURE: 73 MMHG | BODY MASS INDEX: 24.11 KG/M2 | HEART RATE: 60 BPM | OXYGEN SATURATION: 97 %

## 2024-12-11 DIAGNOSIS — E78.5 HYPERLIPIDEMIA, UNSPECIFIED: ICD-10-CM

## 2024-12-11 DIAGNOSIS — Z00.00 ENCOUNTER FOR GENERAL ADULT MEDICAL EXAMINATION WITHOUT ABNORMAL FINDINGS: ICD-10-CM

## 2024-12-11 DIAGNOSIS — R80.9 PROTEINURIA, UNSPECIFIED: ICD-10-CM

## 2024-12-11 DIAGNOSIS — E55.9 VITAMIN D DEFICIENCY, UNSPECIFIED: ICD-10-CM

## 2024-12-11 DIAGNOSIS — R73.03 PREDIABETES.: ICD-10-CM

## 2024-12-11 DIAGNOSIS — Z98.890 OTHER SPECIFIED POSTPROCEDURAL STATES: Chronic | ICD-10-CM

## 2024-12-11 PROCEDURE — G0463: CPT | Mod: 25

## 2024-12-11 PROCEDURE — 11721 DEBRIDE NAIL 6 OR MORE: CPT

## 2024-12-11 RX ORDER — CLOTRIMAZOLE 10 MG/ML
1 SOLUTION TOPICAL TWICE DAILY
Qty: 1 | Refills: 3 | Status: ACTIVE | COMMUNITY
Start: 2024-12-11 | End: 1900-01-01

## 2024-12-12 ENCOUNTER — APPOINTMENT (OUTPATIENT)
Dept: DERMATOLOGY | Facility: CLINIC | Age: 74
End: 2024-12-12
Payer: MEDICARE

## 2024-12-12 VITALS — BODY MASS INDEX: 24.11 KG/M2 | HEIGHT: 66 IN | WEIGHT: 150 LBS

## 2024-12-12 DIAGNOSIS — L28.0 LICHEN SIMPLEX CHRONICUS: ICD-10-CM

## 2024-12-12 DIAGNOSIS — D48.9 NEOPLASM OF UNCERTAIN BEHAVIOR, UNSPECIFIED: ICD-10-CM

## 2024-12-12 PROCEDURE — 99204 OFFICE O/P NEW MOD 45 MIN: CPT | Mod: 25

## 2024-12-12 PROCEDURE — 11102 TANGNTL BX SKIN SINGLE LES: CPT

## 2024-12-12 PROCEDURE — 11103 TANGNTL BX SKIN EA SEP/ADDL: CPT

## 2024-12-12 RX ORDER — BETAMETHASONE DIPROPIONATE 0.5 MG/G
0.05 OINTMENT TOPICAL
Qty: 1 | Refills: 0 | Status: ACTIVE | COMMUNITY
Start: 2024-12-12 | End: 1900-01-01

## 2024-12-13 ENCOUNTER — APPOINTMENT (OUTPATIENT)
Dept: UROLOGY | Facility: CLINIC | Age: 74
End: 2024-12-13
Payer: MEDICARE

## 2024-12-13 VITALS
WEIGHT: 151 LBS | HEIGHT: 66 IN | HEART RATE: 73 BPM | BODY MASS INDEX: 24.27 KG/M2 | SYSTOLIC BLOOD PRESSURE: 113 MMHG | TEMPERATURE: 98.2 F | DIASTOLIC BLOOD PRESSURE: 68 MMHG | OXYGEN SATURATION: 98 %

## 2024-12-13 DIAGNOSIS — M79.671 PAIN IN RIGHT FOOT: ICD-10-CM

## 2024-12-13 DIAGNOSIS — M79.672 PAIN IN LEFT FOOT: ICD-10-CM

## 2024-12-13 DIAGNOSIS — B35.1 TINEA UNGUIUM: ICD-10-CM

## 2024-12-13 DIAGNOSIS — R97.20 ELEVATED PROSTATE, SPECIFIC ANTIGEN [PSA]: ICD-10-CM

## 2024-12-13 DIAGNOSIS — N40.1 BENIGN PROSTATIC HYPERPLASIA WITH LOWER URINARY TRACT SYMPMS: ICD-10-CM

## 2024-12-13 PROCEDURE — 99204 OFFICE O/P NEW MOD 45 MIN: CPT

## 2024-12-13 PROCEDURE — G2211 COMPLEX E/M VISIT ADD ON: CPT

## 2024-12-13 RX ORDER — TAMSULOSIN HYDROCHLORIDE 0.4 MG/1
0.4 CAPSULE ORAL
Qty: 30 | Refills: 0 | Status: ACTIVE | COMMUNITY
Start: 2024-12-13 | End: 1900-01-01

## 2024-12-15 LAB
APPEARANCE: CLEAR
BACTERIA UR CULT: NORMAL
BACTERIA: NEGATIVE /HPF
BILIRUBIN URINE: NEGATIVE
BLOOD URINE: NEGATIVE
CAST: 0 /LPF
COLOR: YELLOW
EPITHELIAL CELLS: 1 /HPF
GLUCOSE QUALITATIVE U: NEGATIVE MG/DL
KETONES URINE: NEGATIVE MG/DL
LEUKOCYTE ESTERASE URINE: NEGATIVE
MICROSCOPIC-UA: NORMAL
NITRITE URINE: NEGATIVE
PH URINE: 5
PROTEIN URINE: NEGATIVE MG/DL
RED BLOOD CELLS URINE: 0 /HPF
SPECIFIC GRAVITY URINE: 1.02
UROBILINOGEN URINE: 0.2 MG/DL
WHITE BLOOD CELLS URINE: 3 /HPF

## 2024-12-16 DIAGNOSIS — F17.200 NICOTINE DEPENDENCE, UNSPECIFIED, UNCOMPLICATED: ICD-10-CM

## 2024-12-16 DIAGNOSIS — M32.9 SYSTEMIC LUPUS ERYTHEMATOSUS, UNSPECIFIED: ICD-10-CM

## 2024-12-16 DIAGNOSIS — J06.9 ACUTE UPPER RESPIRATORY INFECTION, UNSPECIFIED: ICD-10-CM

## 2024-12-16 DIAGNOSIS — M17.9 OSTEOARTHRITIS OF KNEE, UNSPECIFIED: ICD-10-CM

## 2024-12-16 DIAGNOSIS — H61.20 IMPACTED CERUMEN, UNSPECIFIED EAR: ICD-10-CM

## 2024-12-16 DIAGNOSIS — L98.9 DISORDER OF THE SKIN AND SUBCUTANEOUS TISSUE, UNSPECIFIED: ICD-10-CM

## 2024-12-16 DIAGNOSIS — B35.1 TINEA UNGUIUM: ICD-10-CM

## 2024-12-16 DIAGNOSIS — N28.1 CYST OF KIDNEY, ACQUIRED: ICD-10-CM

## 2024-12-16 DIAGNOSIS — K63.5 POLYP OF COLON: ICD-10-CM

## 2024-12-18 ENCOUNTER — APPOINTMENT (OUTPATIENT)
Dept: ULTRASOUND IMAGING | Facility: CLINIC | Age: 74
End: 2024-12-18
Payer: MEDICARE

## 2024-12-18 ENCOUNTER — RESULT REVIEW (OUTPATIENT)
Age: 74
End: 2024-12-18

## 2024-12-18 ENCOUNTER — APPOINTMENT (OUTPATIENT)
Dept: THORACIC SURGERY | Facility: CLINIC | Age: 74
End: 2024-12-18
Payer: MEDICARE

## 2024-12-18 ENCOUNTER — APPOINTMENT (OUTPATIENT)
Dept: CT IMAGING | Facility: CLINIC | Age: 74
End: 2024-12-18
Payer: MEDICARE

## 2024-12-18 ENCOUNTER — NON-APPOINTMENT (OUTPATIENT)
Age: 74
End: 2024-12-18

## 2024-12-18 VITALS — HEIGHT: 66 IN | WEIGHT: 151 LBS | BODY MASS INDEX: 24.27 KG/M2

## 2024-12-18 DIAGNOSIS — F17.210 NICOTINE DEPENDENCE, CIGARETTES, UNCOMPLICATED: ICD-10-CM

## 2024-12-18 PROCEDURE — 74176 CT ABD & PELVIS W/O CONTRAST: CPT | Mod: 26

## 2024-12-18 PROCEDURE — 71271 CT THORAX LUNG CANCER SCR C-: CPT

## 2024-12-18 PROCEDURE — 76775 US EXAM ABDO BACK WALL LIM: CPT

## 2024-12-18 PROCEDURE — G0296 VISIT TO DETERM LDCT ELIG: CPT

## 2024-12-20 DIAGNOSIS — R91.1 SOLITARY PULMONARY NODULE: ICD-10-CM

## 2024-12-20 DIAGNOSIS — N28.1 CYST OF KIDNEY, ACQUIRED: ICD-10-CM

## 2024-12-20 LAB — CORE LAB BIOPSY: NORMAL

## 2024-12-23 DIAGNOSIS — J44.9 CHRONIC OBSTRUCTIVE PULMONARY DISEASE, UNSPECIFIED: ICD-10-CM

## 2024-12-26 ENCOUNTER — NON-APPOINTMENT (OUTPATIENT)
Age: 74
End: 2024-12-26

## 2024-12-26 PROBLEM — F17.210 CIGARETTE SMOKER: Status: ACTIVE | Noted: 2024-12-26

## 2024-12-31 ENCOUNTER — NON-APPOINTMENT (OUTPATIENT)
Age: 74
End: 2024-12-31

## 2025-01-02 ENCOUNTER — NON-APPOINTMENT (OUTPATIENT)
Age: 75
End: 2025-01-02

## 2025-01-02 ENCOUNTER — APPOINTMENT (OUTPATIENT)
Dept: DERMATOLOGY | Facility: CLINIC | Age: 75
End: 2025-01-02
Payer: MEDICARE

## 2025-01-02 VITALS — BODY MASS INDEX: 24.27 KG/M2 | HEIGHT: 66 IN | WEIGHT: 151 LBS

## 2025-01-02 DIAGNOSIS — L30.9 DERMATITIS, UNSPECIFIED: ICD-10-CM

## 2025-01-02 DIAGNOSIS — L57.0 ACTINIC KERATOSIS: ICD-10-CM

## 2025-01-02 DIAGNOSIS — L82.1 OTHER SEBORRHEIC KERATOSIS: ICD-10-CM

## 2025-01-02 DIAGNOSIS — L28.0 LICHEN SIMPLEX CHRONICUS: ICD-10-CM

## 2025-01-02 DIAGNOSIS — L21.9 SEBORRHEIC DERMATITIS, UNSPECIFIED: ICD-10-CM

## 2025-01-02 PROCEDURE — 99214 OFFICE O/P EST MOD 30 MIN: CPT | Mod: 25

## 2025-01-02 PROCEDURE — 17003 DESTRUCT PREMALG LES 2-14: CPT

## 2025-01-02 PROCEDURE — 17000 DESTRUCT PREMALG LESION: CPT

## 2025-01-02 RX ORDER — KETOCONAZOLE 20 MG/G
2 CREAM TOPICAL
Qty: 1 | Refills: 2 | Status: ACTIVE | COMMUNITY
Start: 2025-01-02 | End: 1900-01-01

## 2025-01-02 RX ORDER — FLUOCINONIDE 0.05 MG/G
0.05 OINTMENT TOPICAL
Qty: 1 | Refills: 1 | Status: ACTIVE | COMMUNITY
Start: 2025-01-02 | End: 1900-01-01

## 2025-01-06 ENCOUNTER — NON-APPOINTMENT (OUTPATIENT)
Age: 75
End: 2025-01-06

## 2025-01-07 ENCOUNTER — APPOINTMENT (OUTPATIENT)
Dept: THORACIC SURGERY | Facility: CLINIC | Age: 75
End: 2025-01-07
Payer: MEDICARE

## 2025-01-07 ENCOUNTER — NON-APPOINTMENT (OUTPATIENT)
Age: 75
End: 2025-01-07

## 2025-01-07 VITALS
OXYGEN SATURATION: 97 % | HEART RATE: 61 BPM | DIASTOLIC BLOOD PRESSURE: 73 MMHG | SYSTOLIC BLOOD PRESSURE: 127 MMHG | WEIGHT: 151 LBS | BODY MASS INDEX: 24.27 KG/M2 | HEIGHT: 66 IN | RESPIRATION RATE: 16 BRPM

## 2025-01-07 DIAGNOSIS — R91.1 SOLITARY PULMONARY NODULE: ICD-10-CM

## 2025-01-07 DIAGNOSIS — M32.9 SYSTEMIC LUPUS ERYTHEMATOSUS, UNSPECIFIED: ICD-10-CM

## 2025-01-07 PROCEDURE — 99205 OFFICE O/P NEW HI 60 MIN: CPT

## 2025-01-09 ENCOUNTER — NON-APPOINTMENT (OUTPATIENT)
Age: 75
End: 2025-01-09

## 2025-01-13 ENCOUNTER — APPOINTMENT (OUTPATIENT)
Dept: UROLOGY | Facility: CLINIC | Age: 75
End: 2025-01-13
Payer: MEDICARE

## 2025-01-13 VITALS
SYSTOLIC BLOOD PRESSURE: 125 MMHG | HEART RATE: 84 BPM | WEIGHT: 151 LBS | RESPIRATION RATE: 16 BRPM | TEMPERATURE: 97.7 F | DIASTOLIC BLOOD PRESSURE: 66 MMHG | OXYGEN SATURATION: 97 % | HEIGHT: 66 IN | BODY MASS INDEX: 24.27 KG/M2

## 2025-01-13 DIAGNOSIS — R97.20 ELEVATED PROSTATE, SPECIFIC ANTIGEN [PSA]: ICD-10-CM

## 2025-01-13 DIAGNOSIS — N40.1 BENIGN PROSTATIC HYPERPLASIA WITH LOWER URINARY TRACT SYMPMS: ICD-10-CM

## 2025-01-13 PROCEDURE — G2211 COMPLEX E/M VISIT ADD ON: CPT

## 2025-01-13 PROCEDURE — 99214 OFFICE O/P EST MOD 30 MIN: CPT

## 2025-01-14 ENCOUNTER — NON-APPOINTMENT (OUTPATIENT)
Age: 75
End: 2025-01-14

## 2025-01-14 LAB
APPEARANCE: CLEAR
BACTERIA UR CULT: NORMAL
BACTERIA: NEGATIVE /HPF
BILIRUBIN URINE: NEGATIVE
BLOOD URINE: NEGATIVE
CAST: 0 /LPF
COLOR: YELLOW
EPITHELIAL CELLS: 2 /HPF
GLUCOSE QUALITATIVE U: NEGATIVE MG/DL
KETONES URINE: NEGATIVE MG/DL
LEUKOCYTE ESTERASE URINE: ABNORMAL
MICROSCOPIC-UA: NORMAL
NITRITE URINE: NEGATIVE
PH URINE: 5
PROTEIN URINE: 30 MG/DL
PSA FREE FLD-MCNC: 16 %
PSA FREE SERPL-MCNC: 0.75 NG/ML
PSA SERPL-MCNC: 4.84 NG/ML
RED BLOOD CELLS URINE: 1 /HPF
SPECIFIC GRAVITY URINE: 1.03
UROBILINOGEN URINE: 0.2 MG/DL
WHITE BLOOD CELLS URINE: 3 /HPF

## 2025-01-17 ENCOUNTER — APPOINTMENT (OUTPATIENT)
Dept: MRI IMAGING | Facility: IMAGING CENTER | Age: 75
End: 2025-01-17

## 2025-01-21 ENCOUNTER — APPOINTMENT (OUTPATIENT)
Dept: PULMONOLOGY | Facility: CLINIC | Age: 75
End: 2025-01-21
Payer: MEDICARE

## 2025-01-21 VITALS
SYSTOLIC BLOOD PRESSURE: 137 MMHG | WEIGHT: 153 LBS | DIASTOLIC BLOOD PRESSURE: 76 MMHG | TEMPERATURE: 97.5 F | OXYGEN SATURATION: 97 % | HEART RATE: 71 BPM | BODY MASS INDEX: 24.7 KG/M2 | RESPIRATION RATE: 16 BRPM

## 2025-01-21 PROCEDURE — 94726 PLETHYSMOGRAPHY LUNG VOLUMES: CPT

## 2025-01-21 PROCEDURE — 94060 EVALUATION OF WHEEZING: CPT

## 2025-01-21 PROCEDURE — 94729 DIFFUSING CAPACITY: CPT

## 2025-01-23 ENCOUNTER — APPOINTMENT (OUTPATIENT)
Dept: PULMONOLOGY | Facility: CLINIC | Age: 75
End: 2025-01-23
Payer: MEDICARE

## 2025-01-23 VITALS
OXYGEN SATURATION: 97 % | SYSTOLIC BLOOD PRESSURE: 112 MMHG | BODY MASS INDEX: 25.66 KG/M2 | HEART RATE: 71 BPM | WEIGHT: 154 LBS | RESPIRATION RATE: 15 BRPM | HEIGHT: 65 IN | DIASTOLIC BLOOD PRESSURE: 67 MMHG | TEMPERATURE: 97.2 F

## 2025-01-23 DIAGNOSIS — R91.1 SOLITARY PULMONARY NODULE: ICD-10-CM

## 2025-01-23 DIAGNOSIS — M32.9 SYSTEMIC LUPUS ERYTHEMATOSUS, UNSPECIFIED: ICD-10-CM

## 2025-01-23 PROCEDURE — 99205 OFFICE O/P NEW HI 60 MIN: CPT

## 2025-01-24 LAB
ALBUMIN SERPL ELPH-MCNC: 4.8 G/DL
ALP BLD-CCNC: 107 U/L
ALT SERPL-CCNC: 17 U/L
ANION GAP SERPL CALC-SCNC: 11 MMOL/L
APTT BLD: 34.1 SEC
AST SERPL-CCNC: 13 U/L
BASOPHILS # BLD AUTO: 0.03 K/UL
BASOPHILS NFR BLD AUTO: 0.3 %
BILIRUB SERPL-MCNC: 0.4 MG/DL
BUN SERPL-MCNC: 26 MG/DL
CALCIUM SERPL-MCNC: 10 MG/DL
CHLORIDE SERPL-SCNC: 105 MMOL/L
CO2 SERPL-SCNC: 24 MMOL/L
CREAT SERPL-MCNC: 0.72 MG/DL
EGFR: 96 ML/MIN/1.73M2
EOSINOPHIL # BLD AUTO: 0.36 K/UL
EOSINOPHIL NFR BLD AUTO: 4.1 %
GLUCOSE SERPL-MCNC: 94 MG/DL
HCT VFR BLD CALC: 44.3 %
HGB BLD-MCNC: 14.4 G/DL
IMM GRANULOCYTES NFR BLD AUTO: 0.2 %
INR PPP: 0.98 RATIO
LYMPHOCYTES # BLD AUTO: 1.9 K/UL
LYMPHOCYTES NFR BLD AUTO: 21.7 %
MAN DIFF?: NORMAL
MCHC RBC-ENTMCNC: 30.7 PG
MCHC RBC-ENTMCNC: 32.5 G/DL
MCV RBC AUTO: 94.5 FL
MONOCYTES # BLD AUTO: 0.5 K/UL
MONOCYTES NFR BLD AUTO: 5.7 %
NEUTROPHILS # BLD AUTO: 5.94 K/UL
NEUTROPHILS NFR BLD AUTO: 68 %
PLATELET # BLD AUTO: 261 K/UL
POTASSIUM SERPL-SCNC: 4.7 MMOL/L
PROT SERPL-MCNC: 7.2 G/DL
PT BLD: 11.6 SEC
RBC # BLD: 4.69 M/UL
RBC # FLD: 13.2 %
SODIUM SERPL-SCNC: 141 MMOL/L
WBC # FLD AUTO: 8.75 K/UL

## 2025-02-11 ENCOUNTER — NON-APPOINTMENT (OUTPATIENT)
Age: 75
End: 2025-02-11

## 2025-02-28 ENCOUNTER — NON-APPOINTMENT (OUTPATIENT)
Age: 75
End: 2025-02-28

## 2025-03-03 ENCOUNTER — APPOINTMENT (OUTPATIENT)
Dept: DERMATOLOGY | Facility: CLINIC | Age: 75
End: 2025-03-03
Payer: MEDICARE

## 2025-03-03 ENCOUNTER — OUTPATIENT (OUTPATIENT)
Dept: OUTPATIENT SERVICES | Facility: HOSPITAL | Age: 75
LOS: 1 days | End: 2025-03-03
Payer: COMMERCIAL

## 2025-03-03 ENCOUNTER — APPOINTMENT (OUTPATIENT)
Dept: INTERNAL MEDICINE | Facility: CLINIC | Age: 75
End: 2025-03-03
Payer: MEDICARE

## 2025-03-03 ENCOUNTER — NON-APPOINTMENT (OUTPATIENT)
Age: 75
End: 2025-03-03

## 2025-03-03 VITALS
SYSTOLIC BLOOD PRESSURE: 134 MMHG | OXYGEN SATURATION: 98 % | BODY MASS INDEX: 25.99 KG/M2 | WEIGHT: 156 LBS | HEART RATE: 81 BPM | HEIGHT: 65 IN | RESPIRATION RATE: 18 BRPM | DIASTOLIC BLOOD PRESSURE: 75 MMHG | TEMPERATURE: 97.3 F

## 2025-03-03 VITALS — WEIGHT: 154 LBS | HEIGHT: 65 IN | BODY MASS INDEX: 25.66 KG/M2

## 2025-03-03 DIAGNOSIS — E78.5 HYPERLIPIDEMIA, UNSPECIFIED: ICD-10-CM

## 2025-03-03 DIAGNOSIS — Z00.00 ENCOUNTER FOR GENERAL ADULT MEDICAL EXAMINATION WITHOUT ABNORMAL FINDINGS: ICD-10-CM

## 2025-03-03 DIAGNOSIS — Z98.890 OTHER SPECIFIED POSTPROCEDURAL STATES: Chronic | ICD-10-CM

## 2025-03-03 DIAGNOSIS — Z87.891 PERSONAL HISTORY OF NICOTINE DEPENDENCE: ICD-10-CM

## 2025-03-03 DIAGNOSIS — L28.0 LICHEN SIMPLEX CHRONICUS: ICD-10-CM

## 2025-03-03 DIAGNOSIS — K63.5 POLYP OF COLON: ICD-10-CM

## 2025-03-03 DIAGNOSIS — Z01.818 ENCOUNTER FOR OTHER PREPROCEDURAL EXAMINATION: ICD-10-CM

## 2025-03-03 DIAGNOSIS — L57.0 ACTINIC KERATOSIS: ICD-10-CM

## 2025-03-03 DIAGNOSIS — N40.1 BENIGN PROSTATIC HYPERPLASIA WITH LOWER URINARY TRACT SYMPMS: ICD-10-CM

## 2025-03-03 DIAGNOSIS — L30.9 DERMATITIS, UNSPECIFIED: ICD-10-CM

## 2025-03-03 DIAGNOSIS — D48.9 NEOPLASM OF UNCERTAIN BEHAVIOR, UNSPECIFIED: ICD-10-CM

## 2025-03-03 PROCEDURE — 85730 THROMBOPLASTIN TIME PARTIAL: CPT

## 2025-03-03 PROCEDURE — 17003 DESTRUCT PREMALG LES 2-14: CPT

## 2025-03-03 PROCEDURE — 80053 COMPREHEN METABOLIC PANEL: CPT

## 2025-03-03 PROCEDURE — 82306 VITAMIN D 25 HYDROXY: CPT

## 2025-03-03 PROCEDURE — 81001 URINALYSIS AUTO W/SCOPE: CPT

## 2025-03-03 PROCEDURE — 85025 COMPLETE CBC W/AUTO DIFF WBC: CPT

## 2025-03-03 PROCEDURE — G0463: CPT

## 2025-03-03 PROCEDURE — G2211 COMPLEX E/M VISIT ADD ON: CPT

## 2025-03-03 PROCEDURE — 36415 COLL VENOUS BLD VENIPUNCTURE: CPT

## 2025-03-03 PROCEDURE — 99214 OFFICE O/P EST MOD 30 MIN: CPT

## 2025-03-03 PROCEDURE — 83036 HEMOGLOBIN GLYCOSYLATED A1C: CPT

## 2025-03-03 PROCEDURE — 85610 PROTHROMBIN TIME: CPT

## 2025-03-03 PROCEDURE — 17000 DESTRUCT PREMALG LESION: CPT

## 2025-03-03 PROCEDURE — 99214 OFFICE O/P EST MOD 30 MIN: CPT | Mod: 25

## 2025-03-03 PROCEDURE — 86900 BLOOD TYPING SEROLOGIC ABO: CPT

## 2025-03-03 PROCEDURE — 86901 BLOOD TYPING SEROLOGIC RH(D): CPT

## 2025-03-03 RX ORDER — NICOTINE 21 MG/24HR
14 PATCH, TRANSDERMAL 24 HOURS TRANSDERMAL DAILY
Qty: 30 | Refills: 0 | Status: ACTIVE | COMMUNITY
Start: 2025-03-03 | End: 1900-01-01

## 2025-03-03 RX ORDER — KETOCONAZOLE 20 MG/ML
2 SUSPENSION TOPICAL
Qty: 1 | Refills: 2 | Status: ACTIVE | COMMUNITY
Start: 2025-03-03 | End: 1900-01-01

## 2025-03-04 ENCOUNTER — NON-APPOINTMENT (OUTPATIENT)
Age: 75
End: 2025-03-04

## 2025-03-04 LAB
25(OH)D3 SERPL-MCNC: 31.1 NG/ML
ABORH: NORMAL
ALBUMIN SERPL ELPH-MCNC: 4.8 G/DL
ALP BLD-CCNC: 107 U/L
ALT SERPL-CCNC: 21 U/L
ANION GAP SERPL CALC-SCNC: 13 MMOL/L
APPEARANCE: CLEAR
APTT BLD: 33.1 SEC
AST SERPL-CCNC: 17 U/L
BACTERIA: NEGATIVE /HPF
BASOPHILS # BLD AUTO: 0.03 K/UL
BASOPHILS NFR BLD AUTO: 0.4 %
BILIRUB SERPL-MCNC: 0.5 MG/DL
BILIRUBIN URINE: NEGATIVE
BLOOD URINE: NEGATIVE
BUN SERPL-MCNC: 21 MG/DL
CALCIUM OXALATE CRYSTALS: PRESENT
CALCIUM SERPL-MCNC: 9.7 MG/DL
CAST: 0 /LPF
CHLORIDE SERPL-SCNC: 105 MMOL/L
CO2 SERPL-SCNC: 23 MMOL/L
COLOR: YELLOW
CREAT SERPL-MCNC: 0.73 MG/DL
EGFRCR SERPLBLD CKD-EPI 2021: 96 ML/MIN/1.73M2
EOSINOPHIL # BLD AUTO: 0.35 K/UL
EOSINOPHIL NFR BLD AUTO: 4.2 %
EPITHELIAL CELLS: 1 /HPF
ESTIMATED AVERAGE GLUCOSE: 131 MG/DL
GLUCOSE QUALITATIVE U: NEGATIVE MG/DL
GLUCOSE SERPL-MCNC: 118 MG/DL
HBA1C MFR BLD HPLC: 6.2 %
HCT VFR BLD CALC: 44 %
HGB BLD-MCNC: 14.5 G/DL
IMM GRANULOCYTES NFR BLD AUTO: 0.2 %
INR PPP: 0.93 RATIO
KETONES URINE: NEGATIVE MG/DL
LEUKOCYTE ESTERASE URINE: ABNORMAL
LYMPHOCYTES # BLD AUTO: 1.66 K/UL
LYMPHOCYTES NFR BLD AUTO: 20.1 %
MAN DIFF?: NORMAL
MCHC RBC-ENTMCNC: 31.7 PG
MCHC RBC-ENTMCNC: 33 G/DL
MCV RBC AUTO: 96.3 FL
MICROSCOPIC-UA: NORMAL
MONOCYTES # BLD AUTO: 0.59 K/UL
MONOCYTES NFR BLD AUTO: 7.2 %
MUCUS: PRESENT
NEUTROPHILS # BLD AUTO: 5.6 K/UL
NEUTROPHILS NFR BLD AUTO: 67.9 %
NITRITE URINE: NEGATIVE
PH URINE: 5.5
PLATELET # BLD AUTO: 246 K/UL
POTASSIUM SERPL-SCNC: 4.9 MMOL/L
PROT SERPL-MCNC: 7 G/DL
PROTEIN URINE: 30 MG/DL
PT BLD: 11.1 SEC
RBC # BLD: 4.57 M/UL
RBC # FLD: 13.2 %
RED BLOOD CELLS URINE: 1 /HPF
REVIEW: NORMAL
SODIUM SERPL-SCNC: 141 MMOL/L
SPECIFIC GRAVITY URINE: 1.02
UROBILINOGEN URINE: 0.2 MG/DL
WBC # FLD AUTO: 8.25 K/UL
WHITE BLOOD CELLS URINE: 2 /HPF

## 2025-03-05 ENCOUNTER — APPOINTMENT (OUTPATIENT)
Dept: PULMONOLOGY | Facility: CLINIC | Age: 75
End: 2025-03-05
Payer: MEDICARE

## 2025-03-05 VITALS
BODY MASS INDEX: 25.49 KG/M2 | HEART RATE: 69 BPM | HEIGHT: 65 IN | DIASTOLIC BLOOD PRESSURE: 73 MMHG | OXYGEN SATURATION: 97 % | WEIGHT: 153 LBS | SYSTOLIC BLOOD PRESSURE: 134 MMHG | TEMPERATURE: 97.9 F | RESPIRATION RATE: 16 BRPM

## 2025-03-05 DIAGNOSIS — F17.200 NICOTINE DEPENDENCE, UNSPECIFIED, UNCOMPLICATED: ICD-10-CM

## 2025-03-05 DIAGNOSIS — J44.9 CHRONIC OBSTRUCTIVE PULMONARY DISEASE, UNSPECIFIED: ICD-10-CM

## 2025-03-05 DIAGNOSIS — R91.8 OTHER NONSPECIFIC ABNORMAL FINDING OF LUNG FIELD: ICD-10-CM

## 2025-03-05 PROCEDURE — G2211 COMPLEX E/M VISIT ADD ON: CPT

## 2025-03-05 PROCEDURE — 99215 OFFICE O/P EST HI 40 MIN: CPT | Mod: 25

## 2025-03-05 PROCEDURE — 99406 BEHAV CHNG SMOKING 3-10 MIN: CPT

## 2025-03-05 RX ORDER — UMECLIDINIUM BROMIDE AND VILANTEROL TRIFENATATE 62.5; 25 UG/1; UG/1
62.5-25 POWDER RESPIRATORY (INHALATION) DAILY
Qty: 1 | Refills: 5 | Status: ACTIVE | COMMUNITY
Start: 2025-03-05 | End: 1900-01-01

## 2025-03-05 RX ORDER — VARENICLINE TARTRATE 0.5 (11)-1
0.5 MG X 11 & KIT ORAL
Qty: 1 | Refills: 1 | Status: ACTIVE | COMMUNITY
Start: 2025-03-05 | End: 1900-01-01

## 2025-03-06 DIAGNOSIS — K63.5 POLYP OF COLON: ICD-10-CM

## 2025-03-06 DIAGNOSIS — L57.0 ACTINIC KERATOSIS: ICD-10-CM

## 2025-03-06 DIAGNOSIS — Z01.818 ENCOUNTER FOR OTHER PREPROCEDURAL EXAMINATION: ICD-10-CM

## 2025-03-06 DIAGNOSIS — N40.1 BENIGN PROSTATIC HYPERPLASIA WITH LOWER URINARY TRACT SYMPTOMS: ICD-10-CM

## 2025-03-06 DIAGNOSIS — E78.5 HYPERLIPIDEMIA, UNSPECIFIED: ICD-10-CM

## 2025-03-06 DIAGNOSIS — Z87.891 PERSONAL HISTORY OF NICOTINE DEPENDENCE: ICD-10-CM

## 2025-03-06 DIAGNOSIS — J44.9 CHRONIC OBSTRUCTIVE PULMONARY DISEASE, UNSPECIFIED: ICD-10-CM

## 2025-03-07 ENCOUNTER — APPOINTMENT (OUTPATIENT)
Dept: PULMONOLOGY | Facility: HOSPITAL | Age: 75
End: 2025-03-07

## 2025-03-26 ENCOUNTER — NON-APPOINTMENT (OUTPATIENT)
Age: 75
End: 2025-03-26

## 2025-03-31 ENCOUNTER — OUTPATIENT (OUTPATIENT)
Dept: OUTPATIENT SERVICES | Facility: HOSPITAL | Age: 75
LOS: 1 days | End: 2025-03-31

## 2025-03-31 VITALS
TEMPERATURE: 98 F | SYSTOLIC BLOOD PRESSURE: 121 MMHG | WEIGHT: 154.98 LBS | OXYGEN SATURATION: 97 % | DIASTOLIC BLOOD PRESSURE: 76 MMHG | HEART RATE: 76 BPM | HEIGHT: 65 IN | RESPIRATION RATE: 17 BRPM

## 2025-03-31 DIAGNOSIS — R91.8 OTHER NONSPECIFIC ABNORMAL FINDING OF LUNG FIELD: ICD-10-CM

## 2025-03-31 DIAGNOSIS — Z98.890 OTHER SPECIFIED POSTPROCEDURAL STATES: Chronic | ICD-10-CM

## 2025-03-31 DIAGNOSIS — M32.9 SYSTEMIC LUPUS ERYTHEMATOSUS, UNSPECIFIED: ICD-10-CM

## 2025-03-31 DIAGNOSIS — J44.9 CHRONIC OBSTRUCTIVE PULMONARY DISEASE, UNSPECIFIED: ICD-10-CM

## 2025-03-31 DIAGNOSIS — Z96.652 PRESENCE OF LEFT ARTIFICIAL KNEE JOINT: Chronic | ICD-10-CM

## 2025-03-31 NOTE — H&P PST ADULT - NSICDXPASTMEDICALHX_GEN_ALL_CORE_FT
PAST MEDICAL HISTORY:  High cholesterol     Lupus erythematosus     Osteoarthritis of left knee

## 2025-03-31 NOTE — H&P PST ADULT - INTERPRETATION SERVICES DECLINED
Timothy Conway  [facility]   Nephrology Progress Note     Patient: Sarika Quinonez               Sex: female           MRN: [id number]       YOB: 1989      Age:  34 year old                 Subjective:  Seen and examined this am ,feels better   Chest tube Removed (5/13)  Tolerated PD overnight ,  No Chest pain , no SOB , no nausea, no vomiting  Await BMP         Patient Active Problem List   Diagnosis    Mild intermittent asthma without complication (CMD)    Cataract of right eye due to drug    Systemic lupus erythematosus with glomerular disease  (CMD)    Chronic kidney disease, stage V requiring chronic dialysis  (CMD)    Hyponatremia    Hypocalcemia    Normocytic anemia    Elevated troponin    Primary hypertension    Other hyperlipidemia    Clostridium difficile infection    Bronchitis    Tooth disease    Right atrial mass    Pericardial effusion (CMD)    Abnormal echocardiogram    Anticoagulated on warfarin    Cardiac tamponade (CMD)    Pneumoperitoneum    Pericardial effusion (CMD)    Supratherapeutic INR       Current Facility-Administered Medications   Medication Dose Route Frequency Provider Last Rate Last Admin    cetirizine (ZyrTEC) tablet 5 mg  5 mg Oral Daily MacaDonita pucketts, DO   5 mg at 05/12/24 1237    CARBOXYMethylcellulose (REFRESH TEARS) 0.5 % ophthalmic solution 1 drop  1 drop Both Eyes TID Macaeverardoa Cassi, DO   1 drop at 05/12/24 2107    WARFARIN - PHARMACIST MONITORED Misc   Does not apply See Admin Instructions Jax Crump MD        polyethylene glycol (MIRALAX) packet 17 g  17 g Oral Daily Ruth Conway MD   17 g at 05/12/24 0821    docusate sodium (COLACE) capsule 100 mg  100 mg Oral Daily Ruth Conway MD   100 mg at 05/12/24 0822    carvedilol (COREG) tablet 12.5 mg  12.5 mg Oral BID Johan Walton   12.5 mg at 05/12/24 2055    Delflex Low Calcium/2.5% Dextrose 5,000 mL peritoneal dialysis solution    Intraperitoneal QHS Ruth Conway MD   Given at 24    chlorhexidine gluconate (PERIDEX) 0.12 % solution 15 mL  15 mL Swish & Spit 2 times per day Nury Junior MD   15 mL at 24    pantoprazole (PROTONIX) EC tablet 40 mg  40 mg Oral QAM AC Leah Graves MD   40 mg at 24    clobetasol (TEMOVATE) 0.05 % cream   Topical 2 times per day Leah Graves MD   Given at 24    colchicine (COLCRYS) tablet 0.6 mg  0.6 mg Oral Every  Anthony Ady Ji MD   0.6 mg at 24    amLODIPine (NORVASC) tablet 10 mg  10 mg Oral Daily Judy Alcocer MBBS        atorvastatin (LIPITOR) tablet 20 mg  20 mg Oral QHS Jax Crump MD   20 mg at 24    [Held by provider] cloNIDine (CATAPRES) tablet 0.2 mg  0.2 mg Oral Q8H Nury Junior MD   0.2 mg at 24    folic acid (FOLATE) tablet 1 mg  1 mg Oral Daily Cassi Chapa MD   1 mg at 24    hydroxychloroquine (PLAQUENIL) tablet 200 mg  200 mg Oral Daily Cassi Chapa MD   200 mg at 24    Potassium Standard Replacement Protocol (Levels 3.5 and lower)   Does not apply See Admin Instructions Cassi Chapa MD        Potassium Replacement (Levels 3.6 - 4)   Does not apply See Admin Instructions Cassi Chapa MD        Magnesium Standard Replacement Protocol   Does not apply See Admin Instructions Cassi Chapa MD           Physical Exam:   PHYSICAL EXAM    Intake/Output Summary (Last 24 hours) at 2024 0729  Last data filed at 2024 0600  Gross per 24 hour   Intake 817 ml   Output 3308 ml   Net -2491 ml     Temp (24hrs), Av.6 °F (37 °C), Min:98.2 °F (36.8 °C), Max:99.1 °F (37.3 °C)    Vitals:    24 2315 24 0354 24 0500 24 0654   BP: (!) 136/97 (!) 145/98  (!) 142/94   BP Location:    LUE - Left upper extremity   Patient Position:    Semi-Zambrano's   Pulse: 88  83  77   Resp:   16 16   Temp: 98.2 °F (36.8 °C) 98.4 °F (36.9 °C)  98.4 °F (36.9 °C)   TempSrc: Oral Oral  Oral   SpO2: 98% 99%  98%   Weight:       Height:       LMP: 05/03/2024     Objective:  Gen: NAD, cooperative  Head: NC/AT  Eyes: mild pallor   ENT: OP clear, MMM     Neck: supple, no thyromegaly, no LAD  CV: S1 s2 ,no S3  -   Lungs: CTA b/l,  Abd: PD Exit site clean and dry   Ext: Warm to touch,   Vascular: DP +2 in b/l UE/LE  MS: no joint deformity, ROM intact  Neuro: CN II-XII grossly Intact, no focal deficits  Derm: No rashes or skin lesions   Psych: Normal  affect   Nutrition: No temporal wasting          Intake/Output Summary (Last 24 hours) at 5/13/2024 0729  Last data filed at 5/13/2024 0600  Gross per 24 hour   Intake 817 ml   Output 3308 ml   Net -2491 ml       Lab/Data Reviewed:  Recent Labs   Lab 05/11/24  0329 05/10/24  0329 05/09/24  0430 05/08/24  0309 05/07/24  1208 05/07/24  0518   CO2 21 22 21 26 21 21   BUN 71* 67* 69* 65* 54* 53*   CREATININE 10.55* 9.91* 10.14* 10.72* 11.01* 11.03*   CALCIUM 8.6 7.5* 7.5* 8.0* 8.4 8.9   ALBUMIN  --   --   --  1.6* 1.8* 2.1*   AST  --   --   --  19 22 27   GLUCOSE 110* 119* 96 132* 131* 163*     Recent Labs   Lab 05/12/24  0401 05/11/24  0438 05/10/24  0329   WBC 7.9 8.0 9.8   HGB 8.7* 8.6* 8.0*   HCT 26.3* 25.6* 24.0*    355 338         Recent Labs   Lab 05/13/24  0437 05/12/24  0401 05/11/24  0438   INR 1.2 1.0 1.0   PTT 60* 51* 49*           Assessment/Plan      #ESRD - due to Lupus Nephritis - on Nightly  Cycler Peritoneal dialysis   Primary Unit [facility]   Still makes urine , not yet on Kidney Transplant List   Admitted with SOB , Pericardial Tamponade   Did not tolerate PD (5/9)   Will  switch PD to 1.5 liter infusion  down from 1.8 liters  Switched to 6 cycles  2.5/4.25% dianeal - UF 2.4 liters          #Pericardial Effusion + Tamponade   S/p Pericardicentesis + Drain (5/3/24)  S/p Pericardial window (5/6/24)       #Pneumopericardium    Improved remarkably       #Hyponatremia - due to  appropriate   ADH secretion from pain , Tamponade  Improved  with Pericardiocentesis and peritoneal dialysis   Will Adjust PD solution    Normal saline 500ml bolus (5/8/24)  switched to 4.25% dianela /2.5% (5/10/24)  0.9  N saline 1 liter (5/11/24)      Constipation   Miralax 17gm daily(5/11)   Colace 100mg daily (5/11)       #Hyperkalemia- due to Kidney Injury   S/p Insulin , Glucose , Bicarb , Calcium gluconate  Improved  with Peritoneal Dialysis            #Anemia-  due to Acute blood loss component of ESRD   Transfused PRBC (5/3/24)  Retacrit 55951 units (5/4/24/)  Retacrit 94513 units 5/10/24)     #Lupus with Nephritis  On Prednisone / Plaquenil           #Renal Osteodystrophy   renvela (5/4/24)        #Lactic acidosis - due to decreased perfusion '  From Cardiac tamponade - improved s/p pericardiocentesis   And increased perfusion         # Cardiac Mass   On Heparin drip                                    Patient/Caregiver requests family/friend to interpret.

## 2025-03-31 NOTE — H&P PST ADULT - PROBLEM SELECTOR PLAN 3
Pt instructed to use all scheduled inhalers on morning of surgery.   PFT (1/2025) - mild restriction w/ no sig. BD response, FEV1 93%; mild DLCO

## 2025-03-31 NOTE — H&P PST ADULT - PROBLEM SELECTOR PLAN 1
scheduled for galaxy robotic navigational bronchoscopy, lung biopsy endobronchial ultrasound lymph node biospy  Written & verbal preop instructions, gi prophylaxis & surgical soap given  Pt verbalized good understanding.  Teach back done on surgical soap instructions.

## 2025-03-31 NOTE — H&P PST ADULT - NSICDXPASTSURGICALHX_GEN_ALL_CORE_FT
PAST SURGICAL HISTORY:  H/O foot surgery Right    History of arthroplasty of left knee     History of bunionectomy of right great toe

## 2025-03-31 NOTE — H&P PST ADULT - HISTORY OF PRESENT ILLNESS
73 y/o male pmhx  current smoker >20py  COPD, SLE, HLD, osteoarthritis  referred by PCP to pulmonologist for management of COPD.    Recent imaging show lung nodule - . Found on LDCT w/ 1.2cm PATRICIO spiculated nodule 75 y/o male pmhx  current smoker >20py  COPD, SLE, HLD, osteoarthritis  referred by PCP to pulmonologist for management of COPD.    Recent imaging show lung nodule - . Found on LDCT w/ 1.2cm PATRICIO spiculated nodule.  Pt presents for preop eval for scheduled for galaxy robotic navigational bronchoscopy, lung biopsy endobronchial ultrasound lymph node biospy.

## 2025-03-31 NOTE — H&P PST ADULT - PASSIVE COMMENT
Safety checks every 15 min. Pt. Remained safe throughout shift. Pt. Appears to be sleeping on stomach with snoring like respirations. No nursing interventions needed at this time, writer will continue to monitor.    father

## 2025-03-31 NOTE — H&P PST ADULT - SOURCE OF INFORMATION, PROFILE
[facility] NEUROLOGY  [facility] [place]    Date: 3/1/19  Patient Name: Lindy Heard   MRN: [id number]   PCP: Albino Ortiz  Referring Provider: Self, Aaareferral    Assessment:   Lindy Heard is a 84 y.o. female presenting to [facility] care from long-standing dementia.  Patient's behavior has worsened since last visit however does appear to be at least partially due to to a longstanding personality issue with the patient even prior to her dementia diagnosis.  Given that she is now becoming physically aggressive with family members have recommended switched from Celexa to Zoloft.  Additionally will increase Seroquel to 50 mg nightly with an additional 25 mg during the day as needed.  Again held extensive discussions with patient and family regarding safety, caregiver burnout fatigue, support services, and advanced care planning.    Plan:     Problem List Items Addressed This Visit        Neuro    Late onset Alzheimer's disease with behavioral disturbance    Current Assessment & Plan     -- continue aricept and namenda  -- switch from celexa to zoloft  -- seroquel 25 mg daily pNR         Relevant Medications    QUEtiapine (SEROQUEL) 25 MG Tab       Other    Insomnia    Current Assessment & Plan     seroquel 50 mg nightly             I spent a total of 81 minutes in face to face time with the patient, over half of which was spent on counseling and education about the patient's diagnosis and medications.     Jose Esteban MD  [facility]   Department of Neurology    Patient note was created using Dragon Dictation.  Any errors in syntax or even information may not have been identified and edited on initial review prior to signing this note.  Subjective:          HPI:   Ms. Lindy Heard is a 84 y.o. female presenting in follow-up for dementia.  The patient presents today with her son and daughter who contributes to the history.  Together they report that they had increasing  issues with irritability, agitation, and acting out with the patient. They report that the patient has always been difficult to deal with even prior to developing dementia describing her as always having a very strong personality with a tendency towards physical discipline of her children and family members and at times becoming verbally abusive even as a younger woman.  They report that the patient is now often up and pacing the house in the middle of the night.  She is often rigid during the day refusing to get dressed, bathe, go to her day program, etc often throwing things at family members when she disagrees with what she is told.  They have slowly been adapting safety measures at home such as when they discovered the patient climbing on chairs to try to get to her own medications that had been put out of reach.  Her daughter states that they are gradually looking into extended days services and sitters versus placement.  Her son is now in a caregiver support group and is also attending anger management classes due to the stress of dealing with his mother.  They do not feel that Celexa is beneficial for her at this point.  At the same time, there hesitant to give her medications that are sedating.    PAST MEDICAL HISTORY:  Past Medical History:   Diagnosis Date    Arthritis     Cataract     Chronic kidney disease, stage III (moderate)     Coronary artery disease 2/18/11    Ca++ score 84 mild to moderate LM    Degenerative disc disease     Dementia     Depression     GERD (gastroesophageal reflux disease)     Hyperlipidemia     Hypertension     Thyroid disease      PAST SURGICAL HISTORY:  Past Surgical History:   Procedure Laterality Date    ADENOIDECTOMY      carpal metacarpal metaplasty Right     CARPAL TUNNEL RELEASE      CATARACT EXTRACTION W/  INTRAOCULAR LENS IMPLANT Left 08/09/2016    Dr. Alvares    CATARACT EXTRACTION W/  INTRAOCULAR LENS IMPLANT Right 08/23/2016    Dr. Alvares      SECTION      COLONOSCOPY N/A 2019    Performed by Juan David Gonzales MD at [facility] ENDO (2ND FLR)    ESOPHAGOGASTRODUODENOSCOPY (EGD) N/A 2019    Performed by Russel Knapp MD at [facility] ENDO (4TH FLR)    EYE SURGERY      HYSTERECTOMY      INSERTION-INTRAOCULAR LENS (IOL) Right 2016    Performed by King Alvares MD at [facility] OR 1ST FLR    INSERTION-INTRAOCULAR LENS (IOL) Left 2016    Performed by King [facility]ares MD at [facility] OR 1ST FLR    JOINT REPLACEMENT Right     knee    KNEE ARTHROPLASTY Right     PHACOEMULSIFICATION-ASPIRATION-CATARACT Right 2016    Performed by [facility] at [facility] OR 1ST FLR    PHACOEMULSIFICATION-ASPIRATION-CATARACT Left 2016    Performed by [facility]ares MD at [facility] OR 1ST FLR    TONSILLECTOMY       CURRENT MEDS:  Current Outpatient Medications   Medication Sig Dispense Refill    acetaminophen (TYLENOL ORAL) Take by mouth as needed.      aspirin (ECOTRIN) 81 MG EC tablet Take 81 mg by mouth once daily.        atorvastatin (LIPITOR) 20 MG tablet TAKE 1 TABLET BY MOUTH EVERY EVENING 90 tablet 3    blood sugar diagnostic Strp 1 each by Misc.(Non-Drug; Combo Route) route once daily. One touch strips. 100 each 0    diclofenac sodium (VOLTAREN) 1 % Gel Apply 2 g topically once daily. 1 Tube 0    donepezil (ARICEPT) 10 MG tablet Take 1 tablet (10 mg total) by mouth every evening. 90 tablet 3    doxazosin (CARDURA) 1 MG tablet TAKE 1 TABLET EVERY EVENING 90 tablet 3    ferrous sulfate 325 mg (65 mg iron) Tab tablet Take 325 mg by mouth once daily.      gabapentin (NEURONTIN) 600 MG tablet TAKE 1 TABLET 3 TIMES A  tablet 0    hydrOXYzine HCl (ATARAX) 25 MG tablet TAKE 1 TABLET BY MOUTH EVERY DAY IN THE EVENING 90 tablet 0    irbesartan (AVAPRO) 75 MG tablet Take 1 tablet (75 mg total) by mouth every evening. 90 tablet 3    lancets (ONE TOUCH ULTRASOFT LANCETS) Misc 1 lancet by Misc.(Non-Drug; Combo  Route) route once daily. 100 each 6    lidocaine (XYLOCAINE) 5 % Oint ointment Apply topically as needed. 1 Tube 0    lisinopril (PRINIVIL,ZESTRIL) 5 MG tablet Take 5 mg by mouth once daily.      memantine (NAMENDA) 10 MG Tab TAKE 1 TABLET TWICE A  tablet 2    multivitamin-minerals-lutein (CENTRUM SILVER) Tab Take by mouth. 1 Tablet Oral Every day      omeprazole (PRILOSEC) 40 MG capsule TAKE ONE CAPSULE BY MOUTH EVERY DAY 90 capsule 3    QUEtiapine (SEROQUEL) 25 MG Tab Take 2 tablets (50 mg total) by mouth nightly. May also take 1 tablet (25 mg total) daily as needed. 270 tablet 3    SYNTHROID 75 mcg tablet TAKE 1 TABLET BY MOUTH BEFORE BREAKFAST 90 tablet 3    cetirizine HCl (ZYRTEC ORAL) Take by mouth as needed.      sertraline (ZOLOFT) 25 MG tablet Take 1 tablet (25 mg total) by mouth once daily. 90 tablet 3     No current facility-administered medications for this visit.      ALLERGIES:  Review of patient's allergies indicates:   Allergen Reactions    Augmentin [amoxicillin-pot clavulanate]     Bactrim [sulfamethoxazole-trimethoprim]     Bentyl [dicyclomine]     Hydrocodone Itching    Iodinated contrast- oral and iv dye     Maxzide [triamterene-hydrochlorothiazid]     Prednisone Itching and Rash     FAMILY HISTORY:  Family History   Problem Relation Age of Onset    Heart disease Mother     Heart attack Mother     Diabetes Father     COPD Father     Amblyopia Daughter     Arthritis Daughter         RA    Thyroid disease Daughter     Rheum arthritis Daughter     Osteoporosis Daughter     Glaucoma Sister     Lupus Sister     Arthritis Sister         Rheumatoid    Rheum arthritis Sister     Narcolepsy Sister     Diabetes Son     Blindness Neg Hx     Cataracts Neg Hx     Macular degeneration Neg Hx     Retinal detachment Neg Hx     Strabismus Neg Hx      SOCIAL HISTORY:  Social History     Tobacco Use    Smoking status: Never Smoker    Smokeless tobacco: Never Used    Substance Use Topics    Alcohol use: No    Drug use: No     Review of Systems:  12 review of systems is negative except for the symptoms mentioned in HPI.      Objective:     Vitals:    03/01/19 1329   Weight: 62.6 kg (138 lb 0.1 oz)   Height: 4' (1.219 m)     General: NAD, well nourished   Eyes: no tearing, discharge, no erythema   ENT: moist mucous membranes of the oral cavity, nares patent    Neck: Supple, full range of motion  Cardiovascular: Warm and well perfused, pulses equal and symmetrical  Lungs: Normal work of breathing, normal chest wall excursions  Skin: No rash, lesions, or breakdown on exposed skin  Psychiatry: Mood and affect are appropriate   Abdomen: soft, non tender, non distended  Extremeties: No cyanosis, clubbing or edema.    Neurological   MENTAL STATUS: Alert and oriented to person only. Attention and concentration within normal limits. Speech without dysarthria, able to name and repeat without difficulty. Recent and remote memory poor  CRANIAL NERVES: Visual fields intact. PERRL. EOMI. Facial sensation intact. Face symmetrical. Hearing grossly intact. Full shoulder shrug bilaterally. Tongue protrudes midline   SENSORY: Sensation is intact to light touch throughout.    MOTOR: Normal bulk and tone.   5/5 deltoid, biceps, triceps, interosseous, hand  bilaterally. 5/5 iliopsoas, knee extension/flexion, foot dorsi/plantarflexion bilaterally.    REFLEXES: Symmetric and 2+ throughout.   CEREBELLAR/COORDINATION/GAIT: Gait steady with normal arm swing and stride length.  Finger to nose intact. Normal rapid alternating movements.     MOCA Results 11/10/2017:   Visuospatial/Executive: 1/5  Naming: 3/3  Attention: 1/6  Language: 1/3  Abstraction: 2/2  Delayed Recall: 0/5  Orientation: 2/6  Total:  10/30       patient

## 2025-03-31 NOTE — H&P PST ADULT - PROBLEM SELECTOR PLAN 2
Pt instructed to take  on morning of surgery. Pt instructed to take hydroxychloroquine on morning of surgery.

## 2025-04-03 NOTE — ASU PATIENT PROFILE, ADULT - NSICDXPASTSURGICALHX_GEN_ALL_CORE_FT
PAST SURGICAL HISTORY:  H/O foot surgery Right    History of arthroplasty of left knee     History of bunionectomy of right great toe     
same name as above

## 2025-04-04 ENCOUNTER — OUTPATIENT (OUTPATIENT)
Dept: INPATIENT UNIT | Facility: HOSPITAL | Age: 75
LOS: 1 days | Discharge: HOME | End: 2025-04-04
Payer: MEDICARE

## 2025-04-04 ENCOUNTER — RESULT REVIEW (OUTPATIENT)
Age: 75
End: 2025-04-04

## 2025-04-04 ENCOUNTER — TRANSCRIPTION ENCOUNTER (OUTPATIENT)
Age: 75
End: 2025-04-04

## 2025-04-04 ENCOUNTER — APPOINTMENT (OUTPATIENT)
Dept: PULMONOLOGY | Facility: HOSPITAL | Age: 75
End: 2025-04-04

## 2025-04-04 VITALS
SYSTOLIC BLOOD PRESSURE: 100 MMHG | DIASTOLIC BLOOD PRESSURE: 48 MMHG | OXYGEN SATURATION: 97 % | RESPIRATION RATE: 20 BRPM | HEART RATE: 70 BPM

## 2025-04-04 VITALS
SYSTOLIC BLOOD PRESSURE: 118 MMHG | TEMPERATURE: 98 F | HEIGHT: 65 IN | HEART RATE: 67 BPM | OXYGEN SATURATION: 98 % | RESPIRATION RATE: 18 BRPM | WEIGHT: 149.91 LBS | DIASTOLIC BLOOD PRESSURE: 67 MMHG

## 2025-04-04 DIAGNOSIS — Z98.890 OTHER SPECIFIED POSTPROCEDURAL STATES: Chronic | ICD-10-CM

## 2025-04-04 DIAGNOSIS — R91.8 OTHER NONSPECIFIC ABNORMAL FINDING OF LUNG FIELD: ICD-10-CM

## 2025-04-04 DIAGNOSIS — Z96.652 PRESENCE OF LEFT ARTIFICIAL KNEE JOINT: Chronic | ICD-10-CM

## 2025-04-04 LAB
B PERT IGG+IGM PNL SER: ABNORMAL
COLOR FLD: ABNORMAL
EOSINOPHIL # FLD: 1 % — SIGNIFICANT CHANGE UP
FLUID INTAKE SUBSTANCE CLASS: SIGNIFICANT CHANGE UP
FOLATE+VIT B12 SERBLD-IMP: 0 % — SIGNIFICANT CHANGE UP
GRAM STN FLD: SIGNIFICANT CHANGE UP
LYMPHOCYTES # FLD: 14 % — SIGNIFICANT CHANGE UP
MESOTHL CELL # FLD: 0 % — SIGNIFICANT CHANGE UP
MONOS+MACROS # FLD: 15 % — SIGNIFICANT CHANGE UP
NEUTROPHILS-BODY FLUID: 66 % — SIGNIFICANT CHANGE UP
OTHER CELLS FLD MANUAL: 4 % — SIGNIFICANT CHANGE UP
RCV VOL RI: SIGNIFICANT CHANGE UP CELLS/UL (ref 0–5)
SPECIMEN SOURCE FLD: SIGNIFICANT CHANGE UP
SPECIMEN SOURCE: SIGNIFICANT CHANGE UP
TOTAL CELLS COUNTED, BODY FLUID: 100 CELLS — SIGNIFICANT CHANGE UP
TOTAL NUCLEATED CELL COUNT, BODY FLUID: SIGNIFICANT CHANGE UP CELLS/UL (ref 0–5)
TUBE TYPE: SIGNIFICANT CHANGE UP

## 2025-04-04 PROCEDURE — 31645 BRNCHSC W/THER ASPIR 1ST: CPT | Mod: GC

## 2025-04-04 PROCEDURE — 31627 NAVIGATIONAL BRONCHOSCOPY: CPT | Mod: GC

## 2025-04-04 PROCEDURE — 71045 X-RAY EXAM CHEST 1 VIEW: CPT | Mod: 26

## 2025-04-04 PROCEDURE — 31629 BRONCHOSCOPY/NEEDLE BX EACH: CPT | Mod: GC

## 2025-04-04 PROCEDURE — 31628 BRONCHOSCOPY/LUNG BX EACH: CPT | Mod: GC

## 2025-04-04 PROCEDURE — 31624 DX BRONCHOSCOPE/LAVAGE: CPT | Mod: GC

## 2025-04-04 PROCEDURE — 88112 CYTOPATH CELL ENHANCE TECH: CPT | Mod: 26,59

## 2025-04-04 PROCEDURE — 31652 BRONCH EBUS SAMPLNG 1/2 NODE: CPT | Mod: GC

## 2025-04-04 PROCEDURE — 88173 CYTOPATH EVAL FNA REPORT: CPT | Mod: 26

## 2025-04-04 PROCEDURE — 31654 BRONCH EBUS IVNTJ PERPH LES: CPT | Mod: GC

## 2025-04-04 PROCEDURE — 88305 TISSUE EXAM BY PATHOLOGIST: CPT | Mod: 26

## 2025-04-04 DEVICE — SYS GALAXY BRONCHOSCOPE SINGLE USE: Type: IMPLANTABLE DEVICE | Status: FUNCTIONAL

## 2025-04-04 RX ORDER — BETAMETHASONE DIPROPIONATE 0.5 MG/G
1 OINTMENT, AUGMENTED TOPICAL
Refills: 0 | DISCHARGE

## 2025-04-04 RX ORDER — ONDANSETRON HCL/PF 4 MG/2 ML
4 VIAL (ML) INJECTION ONCE
Refills: 0 | Status: ACTIVE | OUTPATIENT
Start: 2025-04-04 | End: 2026-03-03

## 2025-04-04 RX ORDER — KETOCONAZOLE 20 MG/G
1 CREAM TOPICAL
Refills: 0 | DISCHARGE

## 2025-04-04 RX ORDER — VARENICLINE TARTRATE 0.5 MG/1
1 TABLET, FILM COATED ORAL
Refills: 0 | DISCHARGE

## 2025-04-04 RX ORDER — FENTANYL CITRATE-0.9 % NACL/PF 100MCG/2ML
25 SYRINGE (ML) INTRAVENOUS
Refills: 0 | Status: DISCONTINUED | OUTPATIENT
Start: 2025-04-04 | End: 2025-04-04

## 2025-04-04 RX ORDER — OXYCODONE HYDROCHLORIDE 30 MG/1
5 TABLET ORAL ONCE
Refills: 0 | Status: DISCONTINUED | OUTPATIENT
Start: 2025-04-04 | End: 2025-04-04

## 2025-04-04 RX ORDER — HYDROXYCHLOROQUINE SULFATE 200 MG/1
1 TABLET, FILM COATED ORAL
Refills: 0 | DISCHARGE

## 2025-04-04 RX ORDER — TAMSULOSIN HYDROCHLORIDE 0.4 MG/1
1 CAPSULE ORAL
Refills: 0 | DISCHARGE

## 2025-04-04 RX ORDER — NAPROXEN SODIUM 275 MG
1 TABLET ORAL
Refills: 0 | DISCHARGE

## 2025-04-04 RX ORDER — KETOROLAC TROMETHAMINE 5 MG/ML
1 SOLUTION/ DROPS OPHTHALMIC
Refills: 0 | DISCHARGE

## 2025-04-04 RX ADMIN — OXYCODONE HYDROCHLORIDE 5 MILLIGRAM(S): 30 TABLET ORAL at 14:28

## 2025-04-04 NOTE — ASU DISCHARGE PLAN (ADULT/PEDIATRIC) - ASU DC SPECIAL INSTRUCTIONSFT
Patients may experience fever on the night of the procedure. Patients may cough up specks of blood for 1-2 days after the procedure. If the bleeding is large in volume, bright red, or persistent please contact your pulmonologist. If your fever is persistent and greater than 101F, please contact your pulmonologist. In case of sudden chest pain or trouble breathing, please go to your nearest emergency room and contact your pulmonologist.

## 2025-04-04 NOTE — ASU DISCHARGE PLAN (ADULT/PEDIATRIC) - CARE PROVIDER_API CALL
Nilson Mckenna  Critical Care Medicine  [address] [place] [address], [facility] [address]  [place] [address]  Phone: [phone]  Fax: [phone]  Follow Up Time:

## 2025-04-04 NOTE — ASU DISCHARGE PLAN (ADULT/PEDIATRIC) - FINANCIAL ASSISTANCE
[facility] provides services at a reduced cost to those who are determined to be eligible through [facility]’s financial assistance program. Information regarding [facility]’s financial assistance program can be found by going to [email or web address] or by calling [phone].

## 2025-04-04 NOTE — ASU DISCHARGE PLAN (ADULT/PEDIATRIC) - PROCEDURE
Robotic bronchoscopy with biopsy, flexible bronchoscopy with lavage, endobronchial ultrasound with biopsy

## 2025-04-04 NOTE — ASU DISCHARGE PLAN (ADULT/PEDIATRIC) - NS MD DC FALL RISK RISK
For information on Fall & Injury Prevention, visit: [email or web address] OR  [email or web address] OR  https://www.cdc.gov/steadi/patient.html

## 2025-04-04 NOTE — ASU PREOP CHECKLIST - BOWEL PREP
Detail Level: Simple Render Risk Assessment In Note?: no Additional Notes: Patient consent was obtained to proceed with the visit and recommended plan of care after discussion of all risks and benefits, including the risks of COVID-19 exposure. n/a

## 2025-04-05 LAB
GRAM STN FLD: SIGNIFICANT CHANGE UP
NIGHT BLUE STAIN TISS: SIGNIFICANT CHANGE UP
NIGHT BLUE STAIN TISS: SIGNIFICANT CHANGE UP
SPECIMEN SOURCE: SIGNIFICANT CHANGE UP

## 2025-04-06 LAB
CULTURE RESULTS: SIGNIFICANT CHANGE UP
SPECIMEN SOURCE: SIGNIFICANT CHANGE UP

## 2025-04-08 LAB — NON-GYNECOLOGICAL CYTOLOGY STUDY: SIGNIFICANT CHANGE UP

## 2025-04-09 LAB
CULTURE RESULTS: SIGNIFICANT CHANGE UP
SPECIMEN SOURCE: SIGNIFICANT CHANGE UP

## 2025-04-18 LAB — NIGHT BLUE STAIN TISS: ABNORMAL

## 2025-04-21 ENCOUNTER — APPOINTMENT (OUTPATIENT)
Dept: UROLOGY | Facility: CLINIC | Age: 75
End: 2025-04-21
Payer: MEDICARE

## 2025-04-21 VITALS
DIASTOLIC BLOOD PRESSURE: 69 MMHG | SYSTOLIC BLOOD PRESSURE: 128 MMHG | OXYGEN SATURATION: 95 % | HEART RATE: 72 BPM | TEMPERATURE: 97.8 F

## 2025-04-21 DIAGNOSIS — N40.1 BENIGN PROSTATIC HYPERPLASIA WITH LOWER URINARY TRACT SYMPMS: ICD-10-CM

## 2025-04-21 DIAGNOSIS — R97.20 ELEVATED PROSTATE, SPECIFIC ANTIGEN [PSA]: ICD-10-CM

## 2025-04-21 PROBLEM — L93.0 DISCOID LUPUS ERYTHEMATOSUS: Chronic | Status: ACTIVE | Noted: 2025-03-31

## 2025-04-21 PROCEDURE — 99214 OFFICE O/P EST MOD 30 MIN: CPT

## 2025-04-21 PROCEDURE — G2211 COMPLEX E/M VISIT ADD ON: CPT

## 2025-04-22 LAB
APPEARANCE: CLEAR
BACTERIA: NEGATIVE /HPF
BILIRUBIN URINE: NEGATIVE
BLOOD URINE: NEGATIVE
CAST: 1 /LPF
COLOR: YELLOW
EPITHELIAL CELLS: 2 /HPF
GLUCOSE QUALITATIVE U: NEGATIVE MG/DL
KETONES URINE: NEGATIVE MG/DL
LEUKOCYTE ESTERASE URINE: ABNORMAL
MICROSCOPIC-UA: NORMAL
NITRITE URINE: NEGATIVE
PH URINE: 5.5
PROTEIN URINE: NORMAL MG/DL
PSA FREE FLD-MCNC: 13 %
PSA FREE SERPL-MCNC: 0.65 NG/ML
PSA SERPL-MCNC: 5.14 NG/ML
RED BLOOD CELLS URINE: 0 /HPF
SPECIFIC GRAVITY URINE: 1.02
UROBILINOGEN URINE: 0.2 MG/DL
WHITE BLOOD CELLS URINE: 2 /HPF

## 2025-04-23 ENCOUNTER — APPOINTMENT (OUTPATIENT)
Dept: PODIATRY | Facility: CLINIC | Age: 75
End: 2025-04-23

## 2025-04-23 LAB — BACTERIA UR CULT: NORMAL

## 2025-04-30 ENCOUNTER — APPOINTMENT (OUTPATIENT)
Dept: PULMONOLOGY | Facility: CLINIC | Age: 75
End: 2025-04-30
Payer: MEDICARE

## 2025-04-30 VITALS
HEART RATE: 69 BPM | HEIGHT: 65 IN | RESPIRATION RATE: 16 BRPM | TEMPERATURE: 97.6 F | WEIGHT: 155 LBS | SYSTOLIC BLOOD PRESSURE: 119 MMHG | DIASTOLIC BLOOD PRESSURE: 71 MMHG | BODY MASS INDEX: 25.83 KG/M2 | OXYGEN SATURATION: 95 %

## 2025-04-30 PROCEDURE — G2211 COMPLEX E/M VISIT ADD ON: CPT

## 2025-04-30 PROCEDURE — 99215 OFFICE O/P EST HI 40 MIN: CPT

## 2025-05-05 ENCOUNTER — OUTPATIENT (OUTPATIENT)
Dept: OUTPATIENT SERVICES | Facility: HOSPITAL | Age: 75
LOS: 1 days | End: 2025-05-05
Payer: COMMERCIAL

## 2025-05-05 ENCOUNTER — APPOINTMENT (OUTPATIENT)
Dept: DERMATOLOGY | Facility: CLINIC | Age: 75
End: 2025-05-05
Payer: MEDICARE

## 2025-05-05 ENCOUNTER — APPOINTMENT (OUTPATIENT)
Dept: INTERNAL MEDICINE | Facility: CLINIC | Age: 75
End: 2025-05-05
Payer: MEDICARE

## 2025-05-05 VITALS
DIASTOLIC BLOOD PRESSURE: 72 MMHG | BODY MASS INDEX: 26.33 KG/M2 | OXYGEN SATURATION: 98 % | HEIGHT: 65 IN | SYSTOLIC BLOOD PRESSURE: 121 MMHG | WEIGHT: 158 LBS | TEMPERATURE: 97.4 F | HEART RATE: 77 BPM | RESPIRATION RATE: 18 BRPM

## 2025-05-05 VITALS — HEIGHT: 65 IN | WEIGHT: 155 LBS | BODY MASS INDEX: 25.83 KG/M2

## 2025-05-05 DIAGNOSIS — L28.0 LICHEN SIMPLEX CHRONICUS: ICD-10-CM

## 2025-05-05 DIAGNOSIS — E55.9 VITAMIN D DEFICIENCY, UNSPECIFIED: ICD-10-CM

## 2025-05-05 DIAGNOSIS — Z96.652 PRESENCE OF LEFT ARTIFICIAL KNEE JOINT: Chronic | ICD-10-CM

## 2025-05-05 DIAGNOSIS — Z98.890 OTHER SPECIFIED POSTPROCEDURAL STATES: Chronic | ICD-10-CM

## 2025-05-05 DIAGNOSIS — Z87.891 PERSONAL HISTORY OF NICOTINE DEPENDENCE: ICD-10-CM

## 2025-05-05 DIAGNOSIS — L21.9 SEBORRHEIC DERMATITIS, UNSPECIFIED: ICD-10-CM

## 2025-05-05 DIAGNOSIS — Z86.03 PERSONAL HISTORY OF NEOPLASM OF UNCERTAIN BEHAVIOR: ICD-10-CM

## 2025-05-05 DIAGNOSIS — E78.5 HYPERLIPIDEMIA, UNSPECIFIED: ICD-10-CM

## 2025-05-05 DIAGNOSIS — N40.1 BENIGN PROSTATIC HYPERPLASIA WITH LOWER URINARY TRACT SYMPMS: ICD-10-CM

## 2025-05-05 DIAGNOSIS — L30.9 DERMATITIS, UNSPECIFIED: ICD-10-CM

## 2025-05-05 DIAGNOSIS — R80.9 PROTEINURIA, UNSPECIFIED: ICD-10-CM

## 2025-05-05 DIAGNOSIS — M32.9 SYSTEMIC LUPUS ERYTHEMATOSUS, UNSPECIFIED: ICD-10-CM

## 2025-05-05 DIAGNOSIS — L98.9 DISORDER OF THE SKIN AND SUBCUTANEOUS TISSUE, UNSPECIFIED: ICD-10-CM

## 2025-05-05 DIAGNOSIS — Z87.898 PERSONAL HISTORY OF OTHER SPECIFIED CONDITIONS: ICD-10-CM

## 2025-05-05 DIAGNOSIS — R73.03 PREDIABETES.: ICD-10-CM

## 2025-05-05 DIAGNOSIS — J44.9 CHRONIC OBSTRUCTIVE PULMONARY DISEASE, UNSPECIFIED: ICD-10-CM

## 2025-05-05 DIAGNOSIS — L57.0 ACTINIC KERATOSIS: ICD-10-CM

## 2025-05-05 DIAGNOSIS — N28.1 CYST OF KIDNEY, ACQUIRED: ICD-10-CM

## 2025-05-05 DIAGNOSIS — Z00.00 ENCOUNTER FOR GENERAL ADULT MEDICAL EXAMINATION WITHOUT ABNORMAL FINDINGS: ICD-10-CM

## 2025-05-05 DIAGNOSIS — M17.9 OSTEOARTHRITIS OF KNEE, UNSPECIFIED: ICD-10-CM

## 2025-05-05 DIAGNOSIS — R91.8 OTHER NONSPECIFIC ABNORMAL FINDING OF LUNG FIELD: ICD-10-CM

## 2025-05-05 PROCEDURE — G0463: CPT

## 2025-05-05 PROCEDURE — 17000 DESTRUCT PREMALG LESION: CPT

## 2025-05-05 PROCEDURE — G2211 COMPLEX E/M VISIT ADD ON: CPT

## 2025-05-05 PROCEDURE — 99214 OFFICE O/P EST MOD 30 MIN: CPT

## 2025-05-05 PROCEDURE — 99214 OFFICE O/P EST MOD 30 MIN: CPT | Mod: 25

## 2025-05-05 PROCEDURE — 17003 DESTRUCT PREMALG LES 2-14: CPT

## 2025-05-09 ENCOUNTER — NON-APPOINTMENT (OUTPATIENT)
Age: 75
End: 2025-05-09

## 2025-05-13 ENCOUNTER — APPOINTMENT (OUTPATIENT)
Dept: GASTROENTEROLOGY | Facility: CLINIC | Age: 75
End: 2025-05-13
Payer: MEDICARE

## 2025-05-13 VITALS
WEIGHT: 158 LBS | SYSTOLIC BLOOD PRESSURE: 137 MMHG | DIASTOLIC BLOOD PRESSURE: 74 MMHG | TEMPERATURE: 97.1 F | BODY MASS INDEX: 26.33 KG/M2 | HEART RATE: 76 BPM | OXYGEN SATURATION: 96 % | HEIGHT: 65 IN

## 2025-05-13 DIAGNOSIS — E78.5 HYPERLIPIDEMIA, UNSPECIFIED: ICD-10-CM

## 2025-05-13 DIAGNOSIS — R91.8 OTHER NONSPECIFIC ABNORMAL FINDING OF LUNG FIELD: ICD-10-CM

## 2025-05-13 DIAGNOSIS — K63.5 POLYP OF COLON: ICD-10-CM

## 2025-05-13 DIAGNOSIS — M32.9 SYSTEMIC LUPUS ERYTHEMATOSUS, UNSPECIFIED: ICD-10-CM

## 2025-05-13 DIAGNOSIS — R73.03 PREDIABETES: ICD-10-CM

## 2025-05-13 DIAGNOSIS — N28.1 CYST OF KIDNEY, ACQUIRED: ICD-10-CM

## 2025-05-13 DIAGNOSIS — R80.9 PROTEINURIA, UNSPECIFIED: ICD-10-CM

## 2025-05-13 DIAGNOSIS — Z87.891 PERSONAL HISTORY OF NICOTINE DEPENDENCE: ICD-10-CM

## 2025-05-13 DIAGNOSIS — J44.9 CHRONIC OBSTRUCTIVE PULMONARY DISEASE, UNSPECIFIED: ICD-10-CM

## 2025-05-13 DIAGNOSIS — L57.0 ACTINIC KERATOSIS: ICD-10-CM

## 2025-05-13 DIAGNOSIS — N40.1 BENIGN PROSTATIC HYPERPLASIA WITH LOWER URINARY TRACT SYMPTOMS: ICD-10-CM

## 2025-05-13 PROCEDURE — 99204 OFFICE O/P NEW MOD 45 MIN: CPT

## 2025-05-13 RX ORDER — NAPROXEN 500 MG/1
500 TABLET ORAL
Refills: 0 | Status: ACTIVE | COMMUNITY

## 2025-05-13 RX ORDER — VARENICLINE TARTRATE 25 MG
KIT ORAL
Refills: 0 | Status: ACTIVE | COMMUNITY

## 2025-06-05 DIAGNOSIS — H01.009 UNSPECIFIED BLEPHARITIS UNSPECIFIED EYE, UNSPECIFIED EYELID: ICD-10-CM

## 2025-06-05 DIAGNOSIS — H33.311 HORSESHOE TEAR OF RETINA W/OUT DETACHMENT, RIGHT EYE: ICD-10-CM

## 2025-06-05 DIAGNOSIS — H35.373 PUCKERING OF MACULA, BILATERAL: ICD-10-CM

## 2025-06-05 DIAGNOSIS — H11.823 CONJUNCTIVOCHALASIS, BILATERAL: ICD-10-CM

## 2025-06-05 DIAGNOSIS — Z96.1 PRESENCE OF INTRAOCULAR LENS: ICD-10-CM

## 2025-06-06 ENCOUNTER — APPOINTMENT (OUTPATIENT)
Dept: CT IMAGING | Facility: CLINIC | Age: 75
End: 2025-06-06

## (undated) DEVICE — PREP BETADINE SPONGE STICKS

## (undated) DEVICE — FOR-TOURNIQUET 1130808443: Type: DURABLE MEDICAL EQUIPMENT

## (undated) DEVICE — DRAPE TOWEL BLUE 17" X 24"

## (undated) DEVICE — SOL IRR BAG NS 0.9% 3000ML

## (undated) DEVICE — PACKING STRIP PLAIN 1"

## (undated) DEVICE — DRSG KLING 6"

## (undated) DEVICE — BRONCHOSCOPE GALAXY CONN IRR ASPIR TUBE SCP GUIDE

## (undated) DEVICE — GOWN XL

## (undated) DEVICE — TRAP SPECIMEN SPUTUM 40CC

## (undated) DEVICE — PACK BRONCHOSCOPY

## (undated) DEVICE — BIOPSY FORCEP J&J MONARCH SMOOTH CUP

## (undated) DEVICE — DRSG CURITY GAUZE SPONGE 4 X 4" 12-PLY

## (undated) DEVICE — GLV 8 PROTEXIS (WHITE)

## (undated) DEVICE — BRUSH ALCON MONARCH CYTOLOGY

## (undated) DEVICE — PREP BETADINE KIT

## (undated) DEVICE — SOL IRR POUR NS 0.9% 1500ML

## (undated) DEVICE — NDL ASPIRATION VIZISHOT2 22G

## (undated) DEVICE — PACK MINOR NO DRAPE

## (undated) DEVICE — DRAPE 3/4 SHEET 52X76"

## (undated) DEVICE — DRAPE EXTREMITY 87" X 128.5"

## (undated) DEVICE — NDL ARCHPOINT PULMONARY 21G

## (undated) DEVICE — SYR SLIP 10CC

## (undated) DEVICE — BIOPSY FORCEP OLYMPUS ALLIGATOR 2.0MM

## (undated) DEVICE — DRAPE 1/2 SHEET 40X57"

## (undated) DEVICE — VENODYNE/SCD SLEEVE CALF MEDIUM

## (undated) DEVICE — ELCTR GROUNDING PAD ADULT COVIDIEN

## (undated) DEVICE — DRAPE LIGHT HANDLE COVER (BLUE)

## (undated) DEVICE — FOR-ESU VALLEYLAB T7E15008DX: Type: DURABLE MEDICAL EQUIPMENT

## (undated) DEVICE — STRYKER INTERPULSE HANDPIECE W IRR SUCTION TUBE

## (undated) DEVICE — WARMING BLANKET UPPER ADULT

## (undated) DEVICE — WARMING BLANKET LOWER ADULT